# Patient Record
Sex: MALE | Race: WHITE | NOT HISPANIC OR LATINO | Employment: OTHER | URBAN - METROPOLITAN AREA
[De-identification: names, ages, dates, MRNs, and addresses within clinical notes are randomized per-mention and may not be internally consistent; named-entity substitution may affect disease eponyms.]

---

## 2017-01-19 ENCOUNTER — ANTI-COAG VISIT (OUTPATIENT)
Dept: CARDIOLOGY | Facility: CLINIC | Age: 62
End: 2017-01-19
Payer: MEDICARE

## 2017-01-19 DIAGNOSIS — I48.0 PAROXYSMAL ATRIAL FIBRILLATION: ICD-10-CM

## 2017-01-19 DIAGNOSIS — Z79.01 LONG TERM CURRENT USE OF ANTICOAGULANT THERAPY: Primary | ICD-10-CM

## 2017-01-19 LAB — INR PPP: 1.9 (ref 2–3)

## 2017-01-19 PROCEDURE — 99211 OFF/OP EST MAY X REQ PHY/QHP: CPT | Mod: 25,S$GLB,,

## 2017-01-19 PROCEDURE — 85610 PROTHROMBIN TIME: CPT | Mod: QW,S$GLB,,

## 2017-01-19 NOTE — PROGRESS NOTES
INR a tad low. Pt ate broccoli last night which was out of the normal. He denies missed doses. He denies health changes. He c/o bruising. No s/sx of bleeding. He is already due for a big dose tonight and will be having ETOH this evening. We will leave dose as is. Repeat INR next month. Pt advised to resume normal diet.

## 2017-02-14 DIAGNOSIS — I50.22 CHRONIC SYSTOLIC HEART FAILURE: ICD-10-CM

## 2017-02-15 RX ORDER — FUROSEMIDE 80 MG/1
TABLET ORAL
Qty: 30 TABLET | Refills: 5 | Status: SHIPPED | OUTPATIENT
Start: 2017-02-15 | End: 2017-08-07 | Stop reason: SDUPTHER

## 2017-02-16 ENCOUNTER — ANTI-COAG VISIT (OUTPATIENT)
Dept: CARDIOLOGY | Facility: CLINIC | Age: 62
End: 2017-02-16
Payer: MEDICARE

## 2017-02-16 DIAGNOSIS — I48.0 PAROXYSMAL ATRIAL FIBRILLATION: ICD-10-CM

## 2017-02-16 DIAGNOSIS — Z79.01 LONG TERM CURRENT USE OF ANTICOAGULANT THERAPY: Primary | ICD-10-CM

## 2017-02-16 LAB — INR PPP: 3.1 (ref 2–3)

## 2017-02-16 PROCEDURE — 85610 PROTHROMBIN TIME: CPT | Mod: QW,S$GLB,,

## 2017-02-16 PROCEDURE — 99211 OFF/OP EST MAY X REQ PHY/QHP: CPT | Mod: 25,S$GLB,,

## 2017-02-16 NOTE — PROGRESS NOTES
INR is a tad high today. Pt reports increased ETOH 2 days ago on Gonzalez's day. No other changes. Pt has minor bruising. No s/sx of bleeding. We will have him eat a dark salad or a small serving of greens to help decrease INR. Otherwise, pt will continue on maintenance dose. Repeat INR 3/9 while at [facility] for other appts.

## 2017-03-09 ENCOUNTER — CLINICAL SUPPORT (OUTPATIENT)
Dept: ELECTROPHYSIOLOGY | Facility: CLINIC | Age: 62
End: 2017-03-09
Payer: MEDICARE

## 2017-03-09 ENCOUNTER — ANTI-COAG VISIT (OUTPATIENT)
Dept: CARDIOLOGY | Facility: CLINIC | Age: 62
End: 2017-03-09
Payer: MEDICARE

## 2017-03-09 DIAGNOSIS — I42.9 CARDIOMYOPATHY, PRIMARY: ICD-10-CM

## 2017-03-09 DIAGNOSIS — Z95.810 AUTOMATIC IMPLANTABLE CARDIOVERTER-DEFIBRILLATOR IN SITU: ICD-10-CM

## 2017-03-09 DIAGNOSIS — Z79.01 LONG TERM CURRENT USE OF ANTICOAGULANT THERAPY: Primary | ICD-10-CM

## 2017-03-09 LAB — INR PPP: 3.4 (ref 2–3)

## 2017-03-09 PROCEDURE — 93284 PRGRMG EVAL IMPLANTABLE DFB: CPT | Mod: S$GLB,,, | Performed by: INTERNAL MEDICINE

## 2017-03-09 PROCEDURE — 85610 PROTHROMBIN TIME: CPT | Mod: QW,S$GLB,, | Performed by: PHARMACIST

## 2017-03-09 PROCEDURE — 99211 OFF/OP EST MAY X REQ PHY/QHP: CPT | Mod: 25,S$GLB,, | Performed by: PHARMACIST

## 2017-03-09 NOTE — PROGRESS NOTES
Patient reports no changes in diet or medications. No swelling or fluid retention. INR has been hovering high. I will lower his weekly dose of warfarin. Patient was re-educated on situations that would require placing a call to the coumadin clinic, including bleeding or unusual bruising issues, changes in health, diet or medications, upcoming procedures that require warfarin interruption, and missed coumadin dose(s). Patient expressed understanding that avoidance of consistency with these parameters could cause fluctuations in INR, leading to more frequent visits and increase risk of adverse events.

## 2017-03-30 ENCOUNTER — ANTI-COAG VISIT (OUTPATIENT)
Dept: CARDIOLOGY | Facility: CLINIC | Age: 62
End: 2017-03-30
Payer: MEDICARE

## 2017-03-30 DIAGNOSIS — Z79.01 LONG TERM CURRENT USE OF ANTICOAGULANT THERAPY: Primary | ICD-10-CM

## 2017-03-30 LAB — INR PPP: 3.4 (ref 2–3)

## 2017-03-30 PROCEDURE — 85610 PROTHROMBIN TIME: CPT | Mod: QW,S$GLB,,

## 2017-03-30 PROCEDURE — 99211 OFF/OP EST MAY X REQ PHY/QHP: CPT | Mod: 25,S$GLB,,

## 2017-03-30 NOTE — PROGRESS NOTES
INR high again. Pt reports he adjusted his dose the week of last INR then went back to previous dose. He reports he forgot to mention last visit that he was taking tylenol for a cold he had. He figured that's why he was high. He held a dose 3/16 then took 3mg 3/17-3/18. Pt advised he should not self-adjust and should have called for instructions. INR still high today. He still has a cold but is not taking anything for it. He denies changes in meds or diet. No change in ETOH. No s/sx of bleeding. We will decrease dose for now. Repeat INR in 2 weeks.

## 2017-04-13 ENCOUNTER — ANTI-COAG VISIT (OUTPATIENT)
Dept: CARDIOLOGY | Facility: CLINIC | Age: 62
End: 2017-04-13
Payer: MEDICARE

## 2017-04-13 DIAGNOSIS — Z79.01 LONG TERM CURRENT USE OF ANTICOAGULANT THERAPY: Primary | ICD-10-CM

## 2017-04-13 LAB — INR PPP: 2.3 (ref 2–3)

## 2017-04-13 PROCEDURE — 85610 PROTHROMBIN TIME: CPT | Mod: QW,S$GLB,,

## 2017-04-13 PROCEDURE — 99211 OFF/OP EST MAY X REQ PHY/QHP: CPT | Mod: 25,S$GLB,,

## 2017-04-13 NOTE — PROGRESS NOTES
Pt here for close monitoring due to recent high INR. INR good today. Pt reports urgent care visit last week due to gout attack. Pt took colchicine and hydrocodone. No other changes. No s/sx of bleeding. We will continue on new dose. Repeat INR 4/24 with other labs

## 2017-04-14 DIAGNOSIS — I50.22 CHRONIC SYSTOLIC HEART FAILURE: ICD-10-CM

## 2017-04-14 RX ORDER — VALSARTAN 160 MG/1
TABLET ORAL
Qty: 60 TABLET | Refills: 5 | Status: SHIPPED | OUTPATIENT
Start: 2017-04-14 | End: 2017-10-31 | Stop reason: SDUPTHER

## 2017-04-25 ENCOUNTER — OFFICE VISIT (OUTPATIENT)
Dept: TRANSPLANT | Facility: CLINIC | Age: 62
End: 2017-04-25
Payer: MEDICARE

## 2017-04-25 ENCOUNTER — LAB VISIT (OUTPATIENT)
Dept: LAB | Facility: HOSPITAL | Age: 62
End: 2017-04-25
Attending: INTERNAL MEDICINE
Payer: MEDICARE

## 2017-04-25 ENCOUNTER — ANTI-COAG VISIT (OUTPATIENT)
Dept: CARDIOLOGY | Facility: CLINIC | Age: 62
End: 2017-04-25

## 2017-04-25 VITALS
HEART RATE: 70 BPM | BODY MASS INDEX: 35.24 KG/M2 | DIASTOLIC BLOOD PRESSURE: 55 MMHG | HEIGHT: 73 IN | WEIGHT: 265.88 LBS | SYSTOLIC BLOOD PRESSURE: 103 MMHG

## 2017-04-25 DIAGNOSIS — Z79.01 LONG TERM CURRENT USE OF ANTICOAGULANT THERAPY: ICD-10-CM

## 2017-04-25 DIAGNOSIS — Z95.810 AUTOMATIC IMPLANTABLE CARDIOVERTER-DEFIBRILLATOR IN SITU: ICD-10-CM

## 2017-04-25 DIAGNOSIS — Z95.0 BIVENTRICULAR CARDIAC PACEMAKER IN SITU: ICD-10-CM

## 2017-04-25 DIAGNOSIS — I48.21 PERMANENT ATRIAL FIBRILLATION: ICD-10-CM

## 2017-04-25 DIAGNOSIS — I42.0 DILATED CARDIOMYOPATHY: Chronic | ICD-10-CM

## 2017-04-25 DIAGNOSIS — I44.7 LBBB (LEFT BUNDLE BRANCH BLOCK): ICD-10-CM

## 2017-04-25 DIAGNOSIS — I50.22 CHRONIC SYSTOLIC HEART FAILURE: Primary | ICD-10-CM

## 2017-04-25 LAB
ALBUMIN SERPL BCP-MCNC: 3.9 G/DL
ALP SERPL-CCNC: 64 U/L
ALT SERPL W/O P-5'-P-CCNC: 16 U/L
ANION GAP SERPL CALC-SCNC: 6 MMOL/L
AST SERPL-CCNC: 15 U/L
BILIRUB SERPL-MCNC: 0.7 MG/DL
BUN SERPL-MCNC: 23 MG/DL
CALCIUM SERPL-MCNC: 9.4 MG/DL
CHLORIDE SERPL-SCNC: 102 MMOL/L
CO2 SERPL-SCNC: 31 MMOL/L
CREAT SERPL-MCNC: 1.8 MG/DL
EST. GFR  (AFRICAN AMERICAN): 46.6 ML/MIN/1.73 M^2
EST. GFR  (NON AFRICAN AMERICAN): 40.3 ML/MIN/1.73 M^2
GLUCOSE SERPL-MCNC: 157 MG/DL
INR PPP: 2.8
POTASSIUM SERPL-SCNC: 5.2 MMOL/L
PROT SERPL-MCNC: 7.6 G/DL
PROTHROMBIN TIME: 28.4 SEC
SODIUM SERPL-SCNC: 139 MMOL/L

## 2017-04-25 PROCEDURE — 99999 PR PBB SHADOW E&M-EST. PATIENT-LVL III: CPT | Mod: PBBFAC,,, | Performed by: INTERNAL MEDICINE

## 2017-04-25 PROCEDURE — 3078F DIAST BP <80 MM HG: CPT | Mod: S$GLB,,, | Performed by: INTERNAL MEDICINE

## 2017-04-25 PROCEDURE — 3074F SYST BP LT 130 MM HG: CPT | Mod: S$GLB,,, | Performed by: INTERNAL MEDICINE

## 2017-04-25 PROCEDURE — 1160F RVW MEDS BY RX/DR IN RCRD: CPT | Mod: S$GLB,,, | Performed by: INTERNAL MEDICINE

## 2017-04-25 PROCEDURE — 99215 OFFICE O/P EST HI 40 MIN: CPT | Mod: S$GLB,,, | Performed by: INTERNAL MEDICINE

## 2017-04-25 RX ORDER — HYDROCODONE BITARTRATE AND ACETAMINOPHEN 5; 325 MG/1; MG/1
TABLET ORAL
Refills: 0 | COMMUNITY
Start: 2017-04-07 | End: 2017-06-19

## 2017-04-25 RX ORDER — COLCHICINE 0.6 MG/1
TABLET ORAL
Refills: 0 | COMMUNITY
Start: 2017-04-19

## 2017-04-25 RX ORDER — ALPRAZOLAM 0.5 MG/1
TABLET ORAL
Refills: 0 | COMMUNITY
Start: 2017-04-22 | End: 2017-04-25 | Stop reason: SDUPTHER

## 2017-04-25 NOTE — PROGRESS NOTES
Subjective:    Patient ID:  Shae Ramesh III is a 61 y.o. male who presents for follow-up of No chief complaint on file.      Congestive Heart Failure   Pertinent negatives include no nausea or vomiting.       Mr. 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 Doing well no major symptoms.     Review of Systems   Constitution: Negative for decreased appetite, malaise/fatigue, night sweats, weight gain and weight loss.   Cardiovascular: Negative for claudication, cyanosis, dyspnea on exertion, irregular heartbeat, leg swelling, near-syncope, orthopnea and palpitations.   Respiratory: Negative for hemoptysis, shortness of breath, sleep disturbances due to breathing, snoring, sputum production and wheezing.    Skin: Positive for color change.   Gastrointestinal: Negative for diarrhea, nausea and vomiting.            Physical Exam   Constitutional: He is oriented to person, place, and time. He appears well-developed and well-nourished.   HENT:   Head: Normocephalic and atraumatic.   Eyes: Conjunctivae and EOM are normal. Pupils are equal, round, and reactive to light.   Neck: Normal range of motion. Neck supple. No JVD present.   Cardiovascular: Normal rate and regular rhythm.  Exam reveals no gallop and no friction rub.    Murmur heard.   Holosystolic murmur is present with a grade of 2/6   Pulmonary/Chest: Breath sounds normal. No respiratory distress. He has no wheezes. He has no rales. He exhibits no tenderness.   Abdominal: Soft. Bowel sounds are normal. He exhibits no distension and no mass. There is no hepatosplenomegaly, splenomegaly or hepatomegaly. There is no tenderness. There is no rebound, no guarding and no CVA tenderness.   Musculoskeletal: Normal range of motion. He exhibits no tenderness.    Neurological: He is alert and oriented to person, place, and time. He has normal reflexes. No cranial nerve deficit. He exhibits normal muscle tone. Coordination normal.   Skin: Skin is warm and dry.   Bilateral lower extremity erythema to the anterior shin with prominent varicose veins.   Psychiatric: He has a normal mood and affect.     Assessment:       1. Chronic systolic heart failure    2. Permanent atrial fibrillation    3. LBBB (left bundle branch block)    4. Long term current use of anticoagulant therapy    5. Automatic implantable cardioverter-defibrillator in situ    6. Biventricular cardiac pacemaker in situ         Plan:     NO need for mitral clip. He is asymptomatic        RTC 6 months with 2 D echo

## 2017-04-25 NOTE — MR AVS SNAPSHOT
[facility]  [address]  [facility] [address]  Phone: [phone]                  Shae Ramesh III   2017 1:00 PM   Office Visit    Description:  Male : 1955   Provider:  James Vieyra MD   Department:  [facility]           Reason for Visit     Congestive Heart Failure           Diagnoses this Visit        Comments    Chronic systolic heart failure    -  Primary     Permanent atrial fibrillation         LBBB (left bundle branch block)         Long term current use of anticoagulant therapy         Automatic implantable cardioverter-defibrillator in situ         Biventricular cardiac pacemaker in situ                To Do List           Future Appointments        Provider Department Dept Phone    2017 12:30 PM EKG, APPT [facility] - [phone]    2017 1:00 PM PACEMAKER, ICD [facility] - Arrhythmia [phone]    2017 1:30 PM Myrna Davison, FNP [facility] - Arrhythmia [phone]      Goals (5 Years of Data)     None      Follow-Up and Disposition     Return in about 6 months (around 10/25/2017).    Follow-up and Disposition History      [facility]     [facility] Care Line -  Assistance  Unless otherwise directed by your provider, please contact [facility], our nurse care line that is available for  assistance.     Registered nurses in the [facility] provide: appointment scheduling, clinical advisement, health education, and other advisory services.  Call: [phone] (toll free)               Medications           Message regarding Medications     Verify the changes and/or additions to your medication regime listed below are the same as discussed with your clinician today.  If any of these changes or additions are incorrect, please notify your healthcare provider.        STOP taking these medications     alprazolam (XANAX) 0.5 MG tablet take 1 tablet by mouth if needed for anxiety          "  Verify that the below list of medications is an accurate representation of the medications you are currently taking.  If none reported, the list may be blank. If incorrect, please contact your healthcare provider. Carry this list with you in case of emergency.           Current Medications     alprazolam (XANAX XR) 0.5 MG 24 hr tablet Take 0.5 mg by mouth as needed.     amiodarone (PACERONE) 200 MG Tab take 1 tablet by mouth twice a day for 1 MONTH then 1 tablet by mouth once daily    carvedilol (COREG) 25 MG tablet Take 0.5 tablets (12.5 mg total) by mouth 2 (two) times daily.    colchicine 0.6 mg tablet take 1 tablet by mouth twice a day if needed for gout pain    furosemide (LASIX) 80 MG tablet take 1 tablet by mouth once daily    hydrocodone-acetaminophen 5-325mg (NORCO) 5-325 mg per tablet TAKE 1 TABLET BY MOUTH EVERY 6 HOURS (NO DRIVING WHILE ON THIS MEDICINE)    spironolactone (ALDACTONE) 25 MG tablet Take 1 tablet (25 mg total) by mouth once daily.    valsartan (DIOVAN) 160 MG tablet take 1 tablet by mouth twice a day for blood pressure    warfarin (COUMADIN) 3 MG tablet take 1 tablet by mouth once daily OR AS DIRECTED BY COUMADIN CLINIC    warfarin (COUMADIN) 5 MG tablet Take 1 tablet (5mg) by mouth every day except 8mg on Thursday, Or as directed by Coumadin Clinic  Patient has 3mg tabs           Clinical Reference Information           Your Vitals Were     BP Pulse Height Weight BMI    103/55 70 6' 1" (1.854 m) 120.6 kg (265 lb 14 oz) 35.08 kg/m2      Blood Pressure          Most Recent Value    BP  (!)  103/55      Allergies as of 4/25/2017     Crestor [Rosuvastatin]    Lisinopril      Immunizations Administered on Date of Encounter - 4/25/2017     None      Orders Placed During Today's Visit     Future Labs/Procedures Expected by Expires    2D echo with color flow doppler  10/25/2017 4/25/2018      Maintenance Dialysis History     Patient has no recorded history of maintenance dialysis.      [facility] " Sign-Up     Activating your [facility] account is as easy as 1-2-3!     1) Visit [email or web address], select Sign Up Now, enter this activation code and your date of birth, then select Next.  Activation code not generated  Current Patient Portal Status: Account disabled      2) Create a username and password to use when you visit [facility] in the future and select a security question in case you lose your password and select Next.    3) Enter your e-mail address and click Sign Up!    Additional Information  If you have questions, please e-mail [email or web address] or call [phone] to talk to our [facility] staff. Remember, [facility] is NOT to be used for urgent needs. For medical emergencies, dial 911.         Language Assistance Services     ATTENTION: Language assistance services are available, free of charge. Please call [phone].      ATENCIÓN: Si habla español, tiene a short disposición servicios gratuitos de asistencia lingüística. Llame al [phone].     CHÚ Ý: N?u b?n nói Ti?ng Vi?t, có các d?ch v? h? tr? ngôn ng? mi?n phí dành cho b?n. G?i s? [phone].         [facility] complies with applicable Federal civil rights laws and does not discriminate on the basis of race, color, national origin, age, disability, or sex.

## 2017-05-12 DIAGNOSIS — Z79.01 LONG TERM CURRENT USE OF ANTICOAGULANT THERAPY: ICD-10-CM

## 2017-05-12 DIAGNOSIS — Z79.01 LONG-TERM (CURRENT) USE OF ANTICOAGULANTS: ICD-10-CM

## 2017-05-12 DIAGNOSIS — I48.0 PAROXYSMAL ATRIAL FIBRILLATION: ICD-10-CM

## 2017-05-13 RX ORDER — WARFARIN SODIUM 5 MG/1
TABLET ORAL
Qty: 30 TABLET | Refills: 5 | Status: SHIPPED | OUTPATIENT
Start: 2017-05-13 | End: 2018-05-04 | Stop reason: SDUPTHER

## 2017-05-24 ENCOUNTER — ANTI-COAG VISIT (OUTPATIENT)
Dept: CARDIOLOGY | Facility: CLINIC | Age: 62
End: 2017-05-24
Payer: MEDICARE

## 2017-05-24 DIAGNOSIS — Z79.01 LONG TERM CURRENT USE OF ANTICOAGULANT THERAPY: Primary | ICD-10-CM

## 2017-05-24 DIAGNOSIS — I48.21 PERMANENT ATRIAL FIBRILLATION: ICD-10-CM

## 2017-05-24 LAB — INR PPP: 3.6 (ref 2–3)

## 2017-05-24 PROCEDURE — 99211 OFF/OP EST MAY X REQ PHY/QHP: CPT | Mod: 25,S$GLB,,

## 2017-05-24 PROCEDURE — 85610 PROTHROMBIN TIME: CPT | Mod: QW,S$GLB,,

## 2017-05-24 NOTE — PROGRESS NOTES
INR is high. Patient reports a neck injury. He has been in severe pain. He is undergoing work up with orthopedist. He has been taking pain medications that have acetaminophen in them about 3 times per day. He has not been sleeping well. No other changes. No s/sx of bleeding. We will hold a dose today then resume maintenance dose. Repeat INR in 2 weeks

## 2017-06-06 ENCOUNTER — TELEPHONE (OUTPATIENT)
Dept: ELECTROPHYSIOLOGY | Facility: CLINIC | Age: 62
End: 2017-06-06

## 2017-06-06 DIAGNOSIS — I42.9 CARDIOMYOPATHY, UNSPECIFIED TYPE: Primary | ICD-10-CM

## 2017-06-08 ENCOUNTER — ANTI-COAG VISIT (OUTPATIENT)
Dept: CARDIOLOGY | Facility: CLINIC | Age: 62
End: 2017-06-08
Payer: MEDICARE

## 2017-06-08 DIAGNOSIS — Z79.01 LONG TERM CURRENT USE OF ANTICOAGULANT THERAPY: Primary | ICD-10-CM

## 2017-06-08 DIAGNOSIS — I48.21 PERMANENT ATRIAL FIBRILLATION: ICD-10-CM

## 2017-06-08 LAB — INR PPP: 3 (ref 2–3)

## 2017-06-08 PROCEDURE — 99211 OFF/OP EST MAY X REQ PHY/QHP: CPT | Mod: 25,S$GLB,,

## 2017-06-08 PROCEDURE — 85610 PROTHROMBIN TIME: CPT | Mod: QW,S$GLB,,

## 2017-06-19 ENCOUNTER — OFFICE VISIT (OUTPATIENT)
Dept: ELECTROPHYSIOLOGY | Facility: CLINIC | Age: 62
End: 2017-06-19
Payer: MEDICARE

## 2017-06-19 ENCOUNTER — CLINICAL SUPPORT (OUTPATIENT)
Dept: ELECTROPHYSIOLOGY | Facility: CLINIC | Age: 62
End: 2017-06-19
Payer: MEDICARE

## 2017-06-19 ENCOUNTER — HOSPITAL ENCOUNTER (OUTPATIENT)
Dept: CARDIOLOGY | Facility: CLINIC | Age: 62
Discharge: HOME | End: 2017-06-19
Payer: MEDICARE

## 2017-06-19 VITALS
SYSTOLIC BLOOD PRESSURE: 112 MMHG | HEIGHT: 73 IN | HEART RATE: 66 BPM | BODY MASS INDEX: 33.54 KG/M2 | DIASTOLIC BLOOD PRESSURE: 80 MMHG | WEIGHT: 253.06 LBS

## 2017-06-19 DIAGNOSIS — I10 ESSENTIAL HYPERTENSION: Chronic | ICD-10-CM

## 2017-06-19 DIAGNOSIS — Z79.899 ON AMIODARONE THERAPY: ICD-10-CM

## 2017-06-19 DIAGNOSIS — I34.0 SEVERE MITRAL REGURGITATION: ICD-10-CM

## 2017-06-19 DIAGNOSIS — I42.9 CARDIOMYOPATHY, UNSPECIFIED TYPE: ICD-10-CM

## 2017-06-19 DIAGNOSIS — I44.7 LBBB (LEFT BUNDLE BRANCH BLOCK): ICD-10-CM

## 2017-06-19 DIAGNOSIS — I50.22 CHF (CONGESTIVE HEART FAILURE), NYHA CLASS III, CHRONIC, SYSTOLIC: ICD-10-CM

## 2017-06-19 DIAGNOSIS — E66.9 OBESITY (BMI 30.0-34.9): ICD-10-CM

## 2017-06-19 DIAGNOSIS — I42.0 DILATED CARDIOMYOPATHY: Primary | Chronic | ICD-10-CM

## 2017-06-19 DIAGNOSIS — Z79.01 CURRENT USE OF LONG TERM ANTICOAGULATION: ICD-10-CM

## 2017-06-19 DIAGNOSIS — E78.5 DYSLIPIDEMIA: ICD-10-CM

## 2017-06-19 DIAGNOSIS — Z95.810 BIVENTRICULAR IMPLANTABLE CARDIOVERTER-DEFIBRILLATOR (ICD) IN SITU: ICD-10-CM

## 2017-06-19 DIAGNOSIS — I48.21 PERMANENT ATRIAL FIBRILLATION: ICD-10-CM

## 2017-06-19 DIAGNOSIS — Z95.810 AUTOMATIC IMPLANTABLE CARDIOVERTER-DEFIBRILLATOR IN SITU: ICD-10-CM

## 2017-06-19 DIAGNOSIS — I42.9 CARDIOMYOPATHY, PRIMARY: ICD-10-CM

## 2017-06-19 PROCEDURE — 99999 PR PBB SHADOW E&M-EST. PATIENT-LVL III: CPT | Mod: PBBFAC,,, | Performed by: NURSE PRACTITIONER

## 2017-06-19 PROCEDURE — 93284 PRGRMG EVAL IMPLANTABLE DFB: CPT | Mod: S$GLB,,, | Performed by: INTERNAL MEDICINE

## 2017-06-19 PROCEDURE — 93000 ELECTROCARDIOGRAM COMPLETE: CPT | Mod: S$GLB,,, | Performed by: INTERNAL MEDICINE

## 2017-06-19 PROCEDURE — 99214 OFFICE O/P EST MOD 30 MIN: CPT | Mod: S$GLB,,, | Performed by: NURSE PRACTITIONER

## 2017-06-19 RX ORDER — OXYCODONE AND ACETAMINOPHEN 10; 325 MG/1; MG/1
1 TABLET ORAL
Refills: 0 | COMMUNITY
Start: 2017-06-01 | End: 2017-06-19

## 2017-06-19 NOTE — PROGRESS NOTES
Subjective:    Patient ID:  Shae Ramesh III is a 61 y.o. male who presents for follow-up of BiV ICD Check.     Shae Ramesh III is a patient of Dr. Niesha Rodriguez.    HPI     Mr. Ramesh is a 61 y.o. male with DCM, HFrEF (EF 30-35%), LBBB s/p BiV ICD, AF, severe MR, HTN, dyslipidemia, and obesity. Initially following CRT-D placement, Mr. Ramesh experienced improvement in his EF, but ultimately, it worsened and he developed some CHF sx. At his [facility] EP office visit in June of 2015. Mr. Ramesh was started on amiodarone and aldactone due to AF and apparent R-sided overload. While on 400 mg of amiodaroine daily, his legs became swollen and discolored w/associated SOB. A BNP at the time (07/2016) was 792 (usually 100). Aldactone was increased to 25 bid on in July of 2016, after which he noted symptomatic improvement.   Mr. Ramesh was seen by Dr. Castle, re: mitraclradha and was initially deemed a good candidate but Dr. Vieyra was max'ing his med Rx, and at the time, Mr. Ramesh had been doing a lot better , and at the time, his MR appeared a bit less by Echo. This was deferred.    Since his last office visit (05/20/16), Mr. Ramesh reports feeling well; he denies chest pain, SOB/SLOAN, dizziness, palpitations, or syncope. He reports that he regularly works out at the gym without difficulty.     Recent cardiac studies:  Echo (08/26/16) revealed an EF of 30-35%, biatrial enlargement (moderate LAE; NILTON measuring 46.46 cc/m2), LVDD, severe LVE, eccentric hypertrophy, moderate-to-severe MR, RVE w/normal systolic function, and a PASP of 30 mmHg.   Device Interrogation (06/19/17) reveals an intrinsic SR with stable lead and device function. No arrhythmias or treated episodes noted. He RA paces 26% and BiV paces 95% of the time. Battery voltage 2.900 V (DAVID 2.66 V).     I reviewed today's ECG which demonstrated a BiV-paced rhythm at 66 bpm; , , and QTc 492.    Review of Systems   Constitution: Negative for diaphoresis  and malaise/fatigue.   HENT: Negative for headaches and nosebleeds.    Eyes: Negative for double vision.   Cardiovascular: Negative for chest pain, dyspnea on exertion, irregular heartbeat, near-syncope, palpitations and syncope.   Respiratory: Negative for shortness of breath.    Skin: Negative.    Musculoskeletal: Positive for stiffness.   Gastrointestinal: Negative for abdominal pain, hematemesis and hematochezia.   Genitourinary: Negative for hematuria.   Neurological: Negative for dizziness and light-headedness.   Psychiatric/Behavioral: Negative for altered mental status.        Objective:    Physical Exam   Constitutional: He is oriented to person, place, and time. He appears well-developed and well-nourished.   HENT:   Head: Normocephalic and atraumatic.   Eyes: Pupils are equal, round, and reactive to light.   Cardiovascular: Normal rate, regular rhythm, normal heart sounds and intact distal pulses.    Pulmonary/Chest: Effort normal and breath sounds normal.   Musculoskeletal: Normal range of motion.   Neurological: He is alert and oriented to person, place, and time.   Vitals reviewed.        Assessment:       1. Dilated cardiomyopathy    2. CHF (congestive heart failure), NYHA class III, chronic, systolic    3. LBBB (left bundle branch block)    4. Biventricular implantable cardioverter-defibrillator (ICD) in situ    5. Permanent atrial fibrillation    6. On amiodarone therapy    7. Current use of long term anticoagulation    8. Severe mitral regurgitation    9. Essential hypertension    10. Dyslipidemia    11. Obesity (BMI 30.0-34.9)         Plan:       In summary, Mr. Ramesh is a 61 y.o. male with DCM, HFrEF (EF 30-35%), LBBB s/p BiV ICD, AF, severe MR, HTN, dyslipidemia, and obesity. Mr. Ramesh is doing well from a rhythm perspective with stable lead and device function, 95% BiV pacing, and no arrhythmia noted.    TSH and PFTs now, given long-term amiodarone use; recent LFTs WNL.   Continue current  medication regimen and device settings.   Follow up in device clinic as scheduled.   Follow up in EP clinic in 1 year, sooner as needed.     Myrna Davison, [place], APRN, FNP-C        (A copy of today's note was sent to Dr. Niesha Rodriguez.)

## 2017-06-20 ENCOUNTER — TELEPHONE (OUTPATIENT)
Dept: CARDIOLOGY | Facility: CLINIC | Age: 62
End: 2017-06-20

## 2017-06-20 NOTE — TELEPHONE ENCOUNTER
----- Message from DARÍO Shaffer sent at 6/19/2017  4:29 PM CDT -----  Please let Mr. Ramesh know that his Thyroid level is normal.     Thanks,   CM

## 2017-06-24 RX ORDER — WARFARIN 3 MG/1
TABLET ORAL
Qty: 30 TABLET | Refills: 5 | Status: SHIPPED | OUTPATIENT
Start: 2017-06-24 | End: 2019-02-07 | Stop reason: SDUPTHER

## 2017-07-02 RX ORDER — CARVEDILOL 25 MG/1
TABLET ORAL
Qty: 60 TABLET | Refills: 6 | Status: SHIPPED | OUTPATIENT
Start: 2017-07-02 | End: 2017-09-29 | Stop reason: SDUPTHER

## 2017-07-06 ENCOUNTER — ANTI-COAG VISIT (OUTPATIENT)
Dept: CARDIOLOGY | Facility: CLINIC | Age: 62
End: 2017-07-06
Payer: MEDICARE

## 2017-07-06 DIAGNOSIS — Z79.01 CURRENT USE OF LONG TERM ANTICOAGULATION: Primary | ICD-10-CM

## 2017-07-06 DIAGNOSIS — I48.21 PERMANENT ATRIAL FIBRILLATION: ICD-10-CM

## 2017-07-06 LAB — INR PPP: 3.2 (ref 2–3)

## 2017-07-06 PROCEDURE — 99211 OFF/OP EST MAY X REQ PHY/QHP: CPT | Mod: 25,S$GLB,,

## 2017-07-06 PROCEDURE — 85610 PROTHROMBIN TIME: CPT | Mod: QW,S$GLB,,

## 2017-07-06 NOTE — PROGRESS NOTES
INR trending high. Patient reports a few beers 7/4 but not really out of the ordinary. He is watching his diet and trying to lose weight. He has a few bruises. No signs or symptoms of bleeding. We will decrease dose for now. Repeat INR in 2 weeks.

## 2017-07-20 ENCOUNTER — ANTI-COAG VISIT (OUTPATIENT)
Dept: CARDIOLOGY | Facility: CLINIC | Age: 62
End: 2017-07-20
Payer: MEDICARE

## 2017-07-20 DIAGNOSIS — I48.21 PERMANENT ATRIAL FIBRILLATION: ICD-10-CM

## 2017-07-20 DIAGNOSIS — Z79.01 CURRENT USE OF LONG TERM ANTICOAGULATION: Primary | ICD-10-CM

## 2017-07-20 LAB
CTP QC/QA: YES
INR PPP: 2.2 (ref 2–3)

## 2017-07-20 PROCEDURE — 99211 OFF/OP EST MAY X REQ PHY/QHP: CPT | Mod: 25,S$GLB,,

## 2017-07-20 PROCEDURE — 85610 PROTHROMBIN TIME: CPT | Mod: QW,S$GLB,,

## 2017-07-20 NOTE — PROGRESS NOTES
Patient here for close follow up of recent high INRs and new dose. INR is good today. Patient denies changes. We will continue on current dose. Repeat INR in 2 weeks.

## 2017-08-03 ENCOUNTER — ANTI-COAG VISIT (OUTPATIENT)
Dept: CARDIOLOGY | Facility: CLINIC | Age: 62
End: 2017-08-03
Payer: MEDICARE

## 2017-08-03 DIAGNOSIS — I48.21 PERMANENT ATRIAL FIBRILLATION: ICD-10-CM

## 2017-08-03 DIAGNOSIS — Z79.01 CURRENT USE OF LONG TERM ANTICOAGULATION: Primary | ICD-10-CM

## 2017-08-03 LAB — INR PPP: 2 (ref 2–3)

## 2017-08-03 PROCEDURE — 99211 OFF/OP EST MAY X REQ PHY/QHP: CPT | Mod: 25,S$GLB,,

## 2017-08-03 PROCEDURE — 85610 PROTHROMBIN TIME: CPT | Mod: QW,S$GLB,,

## 2017-08-03 NOTE — PROGRESS NOTES
INR is good. Patient reports fluid in elbow. He reports its not painful. He has seen his PCP. He reports he took colchicine for 3 days. He also had an injection in the office. He does not recall what medication was in the injection. No other changes. No signs or symptoms of bleeding. INR ok. Dose was lowered recently and so we have been watching INR closely. Its on the low end today. We will keep dose as is but will continue close monitoring. Repeat INR in 2 weeks.

## 2017-08-06 RX ORDER — AMIODARONE HYDROCHLORIDE 200 MG/1
TABLET ORAL
Qty: 60 TABLET | Refills: 4 | Status: SHIPPED | OUTPATIENT
Start: 2017-08-06 | End: 2018-10-04

## 2017-08-07 DIAGNOSIS — I50.22 CHRONIC SYSTOLIC HEART FAILURE: ICD-10-CM

## 2017-08-07 RX ORDER — FUROSEMIDE 80 MG/1
TABLET ORAL
Qty: 30 TABLET | Refills: 5 | Status: SHIPPED | OUTPATIENT
Start: 2017-08-07 | End: 2018-01-27 | Stop reason: SDUPTHER

## 2017-08-17 ENCOUNTER — ANTI-COAG VISIT (OUTPATIENT)
Dept: CARDIOLOGY | Facility: CLINIC | Age: 62
End: 2017-08-17

## 2017-08-17 ENCOUNTER — LAB VISIT (OUTPATIENT)
Dept: LAB | Facility: HOSPITAL | Age: 62
End: 2017-08-17
Payer: MEDICARE

## 2017-08-17 DIAGNOSIS — Z79.01 LONG TERM CURRENT USE OF ANTICOAGULANT THERAPY: ICD-10-CM

## 2017-08-17 DIAGNOSIS — Z79.01 CURRENT USE OF LONG TERM ANTICOAGULATION: ICD-10-CM

## 2017-08-17 LAB
INR PPP: 1.8
PROTHROMBIN TIME: 17.9 SEC

## 2017-08-17 PROCEDURE — 36415 COLL VENOUS BLD VENIPUNCTURE: CPT | Mod: PO

## 2017-08-17 PROCEDURE — 85610 PROTHROMBIN TIME: CPT

## 2017-08-31 ENCOUNTER — ANTI-COAG VISIT (OUTPATIENT)
Dept: CARDIOLOGY | Facility: CLINIC | Age: 62
End: 2017-08-31
Payer: MEDICARE

## 2017-08-31 DIAGNOSIS — Z79.01 CURRENT USE OF LONG TERM ANTICOAGULATION: ICD-10-CM

## 2017-08-31 LAB — INR PPP: 2.2 (ref 2–3)

## 2017-08-31 PROCEDURE — 99211 OFF/OP EST MAY X REQ PHY/QHP: CPT | Mod: 25,S$GLB,,

## 2017-08-31 PROCEDURE — 85610 PROTHROMBIN TIME: CPT | Mod: QW,S$GLB,,

## 2017-08-31 NOTE — PROGRESS NOTES
Patient here for close monitoring due to recent low INR and dose change. Patient reports increased uric acid level so started allopurinol. He also reports increase in S.Cr and is now having kidney work up. No other changes. Patient has bruising but nothing significant. No signs or symptoms of bleeding. We will continue on current dose and repeat INR in 3 weeks.

## 2017-09-21 ENCOUNTER — LAB VISIT (OUTPATIENT)
Dept: LAB | Facility: HOSPITAL | Age: 62
End: 2017-09-21
Attending: INTERNAL MEDICINE
Payer: MEDICARE

## 2017-09-21 ENCOUNTER — ANTI-COAG VISIT (OUTPATIENT)
Dept: CARDIOLOGY | Facility: CLINIC | Age: 62
End: 2017-09-21

## 2017-09-21 DIAGNOSIS — Z79.01 LONG TERM CURRENT USE OF ANTICOAGULANT THERAPY: ICD-10-CM

## 2017-09-21 DIAGNOSIS — Z79.01 CURRENT USE OF LONG TERM ANTICOAGULATION: ICD-10-CM

## 2017-09-21 LAB
INR PPP: 1.7
PROTHROMBIN TIME: 16.9 SEC

## 2017-09-21 PROCEDURE — 85610 PROTHROMBIN TIME: CPT

## 2017-09-21 PROCEDURE — 36415 COLL VENOUS BLD VENIPUNCTURE: CPT | Mod: PO

## 2017-09-22 DIAGNOSIS — I50.22 CHRONIC SYSTOLIC HEART FAILURE: Primary | ICD-10-CM

## 2017-09-27 DIAGNOSIS — I42.0 DILATED CARDIOMYOPATHY: ICD-10-CM

## 2017-09-27 DIAGNOSIS — Z95.810 ICD (IMPLANTABLE CARDIOVERTER-DEFIBRILLATOR) IN PLACE: Primary | ICD-10-CM

## 2017-09-29 ENCOUNTER — HOSPITAL ENCOUNTER (OUTPATIENT)
Dept: CARDIOLOGY | Facility: CLINIC | Age: 62
Discharge: HOME | End: 2017-09-29
Payer: MEDICARE

## 2017-09-29 ENCOUNTER — OFFICE VISIT (OUTPATIENT)
Dept: TRANSPLANT | Facility: CLINIC | Age: 62
End: 2017-09-29
Payer: MEDICARE

## 2017-09-29 ENCOUNTER — CLINICAL SUPPORT (OUTPATIENT)
Dept: ELECTROPHYSIOLOGY | Facility: CLINIC | Age: 62
End: 2017-09-29
Payer: MEDICARE

## 2017-09-29 VITALS
HEIGHT: 73 IN | DIASTOLIC BLOOD PRESSURE: 58 MMHG | SYSTOLIC BLOOD PRESSURE: 109 MMHG | WEIGHT: 254 LBS | BODY MASS INDEX: 33.66 KG/M2 | HEART RATE: 82 BPM

## 2017-09-29 DIAGNOSIS — I50.22 CHRONIC SYSTOLIC HEART FAILURE: Primary | ICD-10-CM

## 2017-09-29 DIAGNOSIS — E66.9 OBESITY (BMI 30.0-34.9): ICD-10-CM

## 2017-09-29 DIAGNOSIS — I10 ESSENTIAL HYPERTENSION: Chronic | ICD-10-CM

## 2017-09-29 DIAGNOSIS — I42.0 DILATED CARDIOMYOPATHY: ICD-10-CM

## 2017-09-29 DIAGNOSIS — Z95.810 ICD (IMPLANTABLE CARDIOVERTER-DEFIBRILLATOR) IN PLACE: ICD-10-CM

## 2017-09-29 DIAGNOSIS — Z79.899 ENCOUNTER FOR LONG-TERM (CURRENT) USE OF OTHER MEDICATIONS: ICD-10-CM

## 2017-09-29 DIAGNOSIS — I50.22 CHRONIC SYSTOLIC HEART FAILURE: ICD-10-CM

## 2017-09-29 DIAGNOSIS — Z95.810 BIVENTRICULAR IMPLANTABLE CARDIOVERTER-DEFIBRILLATOR (ICD) IN SITU: ICD-10-CM

## 2017-09-29 DIAGNOSIS — N17.9 ACUTE KIDNEY INJURY: ICD-10-CM

## 2017-09-29 DIAGNOSIS — I48.20 CHRONIC ATRIAL FIBRILLATION: ICD-10-CM

## 2017-09-29 DIAGNOSIS — E87.6 HYPOKALEMIA: ICD-10-CM

## 2017-09-29 LAB
ESTIMATED PA SYSTOLIC PRESSURE: 29.83
MITRAL VALVE MOBILITY: ABNORMAL
MITRAL VALVE REGURGITATION: ABNORMAL
RETIRED EF AND QEF - SEE NOTES: 25 (ref 55–65)
TRICUSPID VALVE REGURGITATION: ABNORMAL

## 2017-09-29 PROCEDURE — 93306 TTE W/DOPPLER COMPLETE: CPT | Mod: S$GLB,,, | Performed by: INTERNAL MEDICINE

## 2017-09-29 PROCEDURE — 3074F SYST BP LT 130 MM HG: CPT | Mod: S$GLB,,, | Performed by: INTERNAL MEDICINE

## 2017-09-29 PROCEDURE — 99999 PR PBB SHADOW E&M-EST. PATIENT-LVL III: CPT | Mod: PBBFAC,,, | Performed by: INTERNAL MEDICINE

## 2017-09-29 PROCEDURE — 93284 PRGRMG EVAL IMPLANTABLE DFB: CPT | Mod: S$GLB,,, | Performed by: INTERNAL MEDICINE

## 2017-09-29 PROCEDURE — 99214 OFFICE O/P EST MOD 30 MIN: CPT | Mod: S$GLB,,, | Performed by: INTERNAL MEDICINE

## 2017-09-29 PROCEDURE — 3078F DIAST BP <80 MM HG: CPT | Mod: S$GLB,,, | Performed by: INTERNAL MEDICINE

## 2017-09-29 PROCEDURE — 3008F BODY MASS INDEX DOCD: CPT | Mod: S$GLB,,, | Performed by: INTERNAL MEDICINE

## 2017-09-29 RX ORDER — ALLOPURINOL 100 MG/1
100 TABLET ORAL DAILY
Refills: 0 | COMMUNITY
Start: 2017-09-06

## 2017-09-29 RX ORDER — CARVEDILOL 12.5 MG/1
12.5 TABLET ORAL 2 TIMES DAILY
Qty: 60 TABLET | Refills: 6 | Status: SHIPPED | OUTPATIENT
Start: 2017-09-29 | End: 2018-04-29 | Stop reason: SDUPTHER

## 2017-09-29 RX ORDER — CARVEDILOL 12.5 MG/1
12.5 TABLET ORAL 2 TIMES DAILY
COMMUNITY
End: 2017-09-29 | Stop reason: SDUPTHER

## 2017-09-29 NOTE — LETTER
September 29, 2017        Gabriel Howard  [address]  [address]  [place] [address]  Phone: [phone]  Fax: [phone]     Ming Pate  [address] [place] [address] [address]  [place] [address]  Phone: [phone]  Fax: [phone]             [facility]  [address]  [facility] [address]  Phone: [phone]   Patient: Shae Ramesh III   MR Number: [id number]   YOB: 1955   Date of Visit: 9/29/2017       Dear Dr. Ming Pate, Gabriel Howard    Thank you for referring Shae Ramesh to me for evaluation. Attached you will find relevant portions of my assessment and plan of care.    If you have questions, please do not hesitate to call me. I look forward to following Shae Ramesh along with you.    Sincerely,    James Vieyra MD    Enclosure    If you would like to receive this communication electronically, please contact [email or web address] or [phone] to request [employer] Link access.    [employer] Link is a tool which provides read-only access to select patient information with whom you have a relationship. Its easy to use and provides real time access to review your patients record including encounter summaries, notes, results, and demographic information.    If you feel you have received this communication in error or would no longer like to receive these types of communications, please e-mail [email or web address]

## 2017-09-29 NOTE — PROGRESS NOTES
Subjective:    Patient ID:  Shae Ramesh III is a 61 y.o. male who presents for follow-up of Congestive Heart Failure      Congestive Heart Failure   Pertinent negatives include no nausea or vomiting.       Mr. 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 Doing well no major symptoms. Creatinine 1.8 mg/dl    Review of Systems   Constitution: Negative for decreased appetite, malaise/fatigue, night sweats, weight gain and weight loss.   Cardiovascular: Negative for claudication, cyanosis, dyspnea on exertion, irregular heartbeat, leg swelling, near-syncope, orthopnea and palpitations.   Respiratory: Negative for hemoptysis, shortness of breath, sleep disturbances due to breathing, snoring, sputum production and wheezing.    Skin: Positive for color change.   Gastrointestinal: Negative for diarrhea, nausea and vomiting.            Physical Exam   Constitutional: He is oriented to person, place, and time. He appears well-developed and well-nourished.   HENT:   Head: Normocephalic and atraumatic.   Eyes: Conjunctivae and EOM are normal. Pupils are equal, round, and reactive to light.   Neck: Normal range of motion. Neck supple. No JVD present.   Cardiovascular: Normal rate and regular rhythm.  Exam reveals no gallop and no friction rub.    Murmur heard.   Holosystolic murmur is present with a grade of 2/6   Pulmonary/Chest: Breath sounds normal. No respiratory distress. He has no wheezes. He has no rales. He exhibits no tenderness.   Abdominal: Soft. Bowel sounds are normal. He exhibits no distension and no mass. There is no hepatosplenomegaly, splenomegaly or hepatomegaly. There is no tenderness. There is no rebound, no guarding and no CVA tenderness.   Musculoskeletal: Normal range of motion. He exhibits no  tenderness.   Neurological: He is alert and oriented to person, place, and time. He has normal reflexes. No cranial nerve deficit. He exhibits normal muscle tone. Coordination normal.   Skin: Skin is warm and dry.   Bilateral lower extremity erythema to the anterior shin with prominent varicose veins.   Psychiatric: He has a normal mood and affect.     Assessment:       1. Chronic systolic heart failure    2. Chronic atrial fibrillation    3. Biventricular implantable cardioverter-defibrillator (ICD) in situ    4. Essential hypertension    5. Hypokalemia    6. Acute kidney injury    7. Obesity (BMI 30.0-34.9)    8. Encounter for long-term (current) use of other medications         Plan:     I will prefer to decrease lasix instead valsartan or aldactone    We will continue to follow    RTC 6 months

## 2017-10-05 ENCOUNTER — ANTI-COAG VISIT (OUTPATIENT)
Dept: CARDIOLOGY | Facility: CLINIC | Age: 62
End: 2017-10-05
Payer: MEDICARE

## 2017-10-05 DIAGNOSIS — Z79.01 CURRENT USE OF LONG TERM ANTICOAGULATION: ICD-10-CM

## 2017-10-05 LAB — INR PPP: 2.1 (ref 2–3)

## 2017-10-05 PROCEDURE — 99211 OFF/OP EST MAY X REQ PHY/QHP: CPT | Mod: 25,S$GLB,,

## 2017-10-05 PROCEDURE — 85610 PROTHROMBIN TIME: CPT | Mod: QW,S$GLB,,

## 2017-10-05 NOTE — PROGRESS NOTES
Patient here for close monitoring due to recent low INR. INR good today. No changes reported. No signs or symptoms of bleeding. Continue maintenance dose and Recheck INR in 3 weeks

## 2017-10-26 ENCOUNTER — ANTI-COAG VISIT (OUTPATIENT)
Dept: CARDIOLOGY | Facility: CLINIC | Age: 62
End: 2017-10-26
Payer: MEDICARE

## 2017-10-26 DIAGNOSIS — Z79.01 CURRENT USE OF LONG TERM ANTICOAGULATION: ICD-10-CM

## 2017-10-26 LAB — INR PPP: 2.3 (ref 2–3)

## 2017-10-26 PROCEDURE — 85610 PROTHROMBIN TIME: CPT | Mod: QW,S$GLB,,

## 2017-10-26 PROCEDURE — 99211 OFF/OP EST MAY X REQ PHY/QHP: CPT | Mod: 25,S$GLB,,

## 2017-10-26 NOTE — PROGRESS NOTES
INR good. Patient reports worsening kidney function. He is being worked up by cards and nephrology. He reports decrease in lasix. He denies any other changes. He has bruising. No signs or symptoms of bleeding. INR stable. Maintain current dose and repeat INR in 4 weeks.

## 2017-10-31 DIAGNOSIS — I50.22 CHRONIC SYSTOLIC HEART FAILURE: ICD-10-CM

## 2017-10-31 RX ORDER — VALSARTAN 160 MG/1
TABLET ORAL
Qty: 60 TABLET | Refills: 3 | Status: SHIPPED | OUTPATIENT
Start: 2017-10-31 | End: 2018-02-23 | Stop reason: SDUPTHER

## 2017-11-17 DIAGNOSIS — I50.22 CHF (CONGESTIVE HEART FAILURE), NYHA CLASS III, CHRONIC, SYSTOLIC: ICD-10-CM

## 2017-11-17 RX ORDER — SPIRONOLACTONE 25 MG/1
TABLET ORAL
Qty: 30 TABLET | Refills: 11 | Status: SHIPPED | OUTPATIENT
Start: 2017-11-17 | End: 2018-11-21 | Stop reason: SDUPTHER

## 2017-12-06 NOTE — PROGRESS NOTES
Patient called to reschedule 11/20 missed appointment with the [facility] coumadin  [facility], it was rescheduled to 12/14

## 2017-12-14 ENCOUNTER — ANTI-COAG VISIT (OUTPATIENT)
Dept: CARDIOLOGY | Facility: CLINIC | Age: 62
End: 2017-12-14
Payer: MEDICARE

## 2017-12-14 DIAGNOSIS — Z79.01 CURRENT USE OF LONG TERM ANTICOAGULATION: ICD-10-CM

## 2017-12-14 LAB — INR PPP: 2.1 (ref 2–3)

## 2017-12-14 PROCEDURE — 85610 PROTHROMBIN TIME: CPT | Mod: QW,S$GLB,,

## 2017-12-14 PROCEDURE — 99211 OFF/OP EST MAY X REQ PHY/QHP: CPT | Mod: 25,S$GLB,,

## 2017-12-14 NOTE — PROGRESS NOTES
INR good. Patient reports taking colchicine for gout about 2 weeks ago. No interaction. No other changes. He has bruising on arms. No signs or symptoms of bleeding. Maintain current dose and repeat INR next month

## 2018-01-18 ENCOUNTER — LAB VISIT (OUTPATIENT)
Dept: LAB | Facility: HOSPITAL | Age: 63
End: 2018-01-18
Payer: MEDICARE

## 2018-01-18 ENCOUNTER — ANTI-COAG VISIT (OUTPATIENT)
Dept: CARDIOLOGY | Facility: CLINIC | Age: 63
End: 2018-01-18

## 2018-01-18 DIAGNOSIS — Z79.01 CURRENT USE OF LONG TERM ANTICOAGULATION: ICD-10-CM

## 2018-01-18 DIAGNOSIS — Z79.01 LONG TERM CURRENT USE OF ANTICOAGULANT THERAPY: ICD-10-CM

## 2018-01-18 LAB
INR PPP: 2.7
PROTHROMBIN TIME: 26.4 SEC

## 2018-01-18 PROCEDURE — 36415 COLL VENOUS BLD VENIPUNCTURE: CPT | Mod: PO

## 2018-01-18 PROCEDURE — 85610 PROTHROMBIN TIME: CPT

## 2018-01-27 DIAGNOSIS — I50.22 CHRONIC SYSTOLIC HEART FAILURE: ICD-10-CM

## 2018-01-28 RX ORDER — FUROSEMIDE 80 MG/1
TABLET ORAL
Qty: 30 TABLET | Refills: 5 | Status: SHIPPED | OUTPATIENT
Start: 2018-01-28 | End: 2019-09-12 | Stop reason: SDUPTHER

## 2018-02-07 NOTE — PROGRESS NOTES
Patient called to reschedule 2/22 coumadin clinic appointment due to conflict in schedule, it was rescheduled to 2/26

## 2018-02-23 DIAGNOSIS — I50.22 CHRONIC SYSTOLIC HEART FAILURE: ICD-10-CM

## 2018-02-23 RX ORDER — VALSARTAN 160 MG/1
TABLET ORAL
Qty: 60 TABLET | Refills: 3 | Status: SHIPPED | OUTPATIENT
Start: 2018-02-23 | End: 2018-06-24 | Stop reason: SDUPTHER

## 2018-02-26 ENCOUNTER — ANTI-COAG VISIT (OUTPATIENT)
Dept: CARDIOLOGY | Facility: CLINIC | Age: 63
End: 2018-02-26
Payer: MEDICARE

## 2018-02-26 DIAGNOSIS — Z79.01 CURRENT USE OF LONG TERM ANTICOAGULATION: ICD-10-CM

## 2018-02-26 LAB — INR PPP: 2.7 (ref 2–3)

## 2018-02-26 PROCEDURE — 85610 PROTHROMBIN TIME: CPT | Mod: QW,S$GLB,,

## 2018-02-26 PROCEDURE — 99211 OFF/OP EST MAY X REQ PHY/QHP: CPT | Mod: 25,S$GLB,,

## 2018-02-26 NOTE — PROGRESS NOTES
INR good. Patient reports he was sick beginning of January for about 6 weeks. He took antibiotics from his PCP. He also had a syncopal episode due to dehydration. He was admitted to an outside hospital and held coumadin for 2 days. That was about a month ago. Patient advised to keep us updated with health and medication changes. Today, everything is back to normal and hence stable INR. He has bruising. No signs or symptoms of bleeding. We will continue on maintenance dose and repeat INR next month. Patient again advised to keep us informed of changes, procedures, medications, etc.

## 2018-03-14 ENCOUNTER — OFFICE VISIT (OUTPATIENT)
Dept: TRANSPLANT | Facility: CLINIC | Age: 63
End: 2018-03-14
Payer: MEDICARE

## 2018-03-14 VITALS
HEIGHT: 73 IN | DIASTOLIC BLOOD PRESSURE: 68 MMHG | BODY MASS INDEX: 33.04 KG/M2 | SYSTOLIC BLOOD PRESSURE: 118 MMHG | HEART RATE: 65 BPM | WEIGHT: 249.31 LBS

## 2018-03-14 DIAGNOSIS — Z79.01 CURRENT USE OF LONG TERM ANTICOAGULATION: ICD-10-CM

## 2018-03-14 DIAGNOSIS — Z79.01 ANTICOAGULATION MONITORING BY PHARMACIST: ICD-10-CM

## 2018-03-14 DIAGNOSIS — Z95.810 BIVENTRICULAR IMPLANTABLE CARDIOVERTER-DEFIBRILLATOR (ICD) IN SITU: ICD-10-CM

## 2018-03-14 DIAGNOSIS — I50.22 CHRONIC SYSTOLIC HEART FAILURE: Primary | ICD-10-CM

## 2018-03-14 DIAGNOSIS — Z79.899 ON AMIODARONE THERAPY: ICD-10-CM

## 2018-03-14 DIAGNOSIS — I34.0 SEVERE MITRAL REGURGITATION: ICD-10-CM

## 2018-03-14 PROCEDURE — 99999 PR PBB SHADOW E&M-EST. PATIENT-LVL III: CPT | Mod: PBBFAC,,, | Performed by: INTERNAL MEDICINE

## 2018-03-14 PROCEDURE — 99215 OFFICE O/P EST HI 40 MIN: CPT | Mod: S$GLB,,, | Performed by: INTERNAL MEDICINE

## 2018-03-14 PROCEDURE — 3078F DIAST BP <80 MM HG: CPT | Mod: CPTII,S$GLB,, | Performed by: INTERNAL MEDICINE

## 2018-03-14 PROCEDURE — 3074F SYST BP LT 130 MM HG: CPT | Mod: CPTII,S$GLB,, | Performed by: INTERNAL MEDICINE

## 2018-03-14 NOTE — LETTER
March 14, 2018        Gabriel Howard  [address]  [address]  [place] [address]  Phone: [phone]  Fax: [phone]     Ming Pate  [address] [place] [address] [address]  [place] [address]  Phone: [phone]  Fax: [phone]             [facility]  [address]  [facility] [address]  Phone: [phone]   Patient: Shae Ramesh III   MR Number: [id number]   YOB: 1955   Date of Visit: 3/14/2018       Dear Dr. Ming Pate, Gabriel Howard    Thank you for referring Shae Ramesh to me for evaluation. Attached you will find relevant portions of my assessment and plan of care.    If you have questions, please do not hesitate to call me. I look forward to following Shae Ramesh along with you.    Sincerely,    James Vieyra MD    Enclosure    If you would like to receive this communication electronically, please contact [email or web address] or [phone] to request [employer] Link access.    [employer] Link is a tool which provides read-only access to select patient information with whom you have a relationship. Its easy to use and provides real time access to review your patients record including encounter summaries, notes, results, and demographic information.    If you feel you have received this communication in error or would no longer like to receive these types of communications, please e-mail [email or web address]

## 2018-03-14 NOTE — PROGRESS NOTES
Subjective:    Patient ID:  Shae Ramesh III is a 62 y.o. male who presents for follow-up of Congestive Heart Failure (6mo visit)      Congestive Heart Failure   Pertinent negatives include no nausea or vomiting.       Mr. 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 Doing well no major symptoms.     Review of Systems   Constitution: Negative for decreased appetite, malaise/fatigue, night sweats, weight gain and weight loss.   Cardiovascular: Negative for claudication, cyanosis, dyspnea on exertion, irregular heartbeat, leg swelling, near-syncope, orthopnea and palpitations.   Respiratory: Negative for hemoptysis, shortness of breath, sleep disturbances due to breathing, snoring, sputum production and wheezing.    Skin: Positive for color change.   Gastrointestinal: Negative for diarrhea, nausea and vomiting.            Physical Exam   Constitutional: He is oriented to person, place, and time. He appears well-developed and well-nourished.   HENT:   Head: Normocephalic and atraumatic.   Eyes: Conjunctivae and EOM are normal. Pupils are equal, round, and reactive to light.   Neck: Normal range of motion. Neck supple. No JVD present.   Cardiovascular: Normal rate and regular rhythm.  Exam reveals no gallop and no friction rub.    Murmur heard.   Holosystolic murmur is present with a grade of 2/6   Pulmonary/Chest: Breath sounds normal. No respiratory distress. He has no wheezes. He has no rales. He exhibits no tenderness.   Abdominal: Soft. Bowel sounds are normal. He exhibits no distension and no mass. There is no hepatosplenomegaly, splenomegaly or hepatomegaly. There is no tenderness. There is no rebound, no guarding and no CVA tenderness.   Musculoskeletal: Normal range of motion. He exhibits no  tenderness.   Neurological: He is alert and oriented to person, place, and time. He has normal reflexes. No cranial nerve deficit. He exhibits normal muscle tone. Coordination normal.   Skin: Skin is warm and dry.   Bilateral lower extremity erythema to the anterior shin with prominent varicose veins.   Psychiatric: He has a normal mood and affect.     Assessment:       1. Chronic systolic heart failure    2. Biventricular implantable cardioverter-defibrillator (ICD) in situ    3. On amiodarone therapy    4. Severe mitral regurgitation    5. Anticoagulation monitoring by pharmacist    6. Current use of long term anticoagulation         Plan:     CMP in 4 months Consider entresto. Can be off COumadin for 5 days prior to colosnoscopy    RTC 4 months

## 2018-03-29 ENCOUNTER — CLINICAL SUPPORT (OUTPATIENT)
Dept: ELECTROPHYSIOLOGY | Facility: CLINIC | Age: 63
End: 2018-03-29
Attending: INTERNAL MEDICINE
Payer: MEDICARE

## 2018-03-29 DIAGNOSIS — Z95.810 ICD (IMPLANTABLE CARDIOVERTER-DEFIBRILLATOR) IN PLACE: ICD-10-CM

## 2018-03-29 DIAGNOSIS — I42.0 DILATED CARDIOMYOPATHY: ICD-10-CM

## 2018-03-29 PROCEDURE — 93284 PRGRMG EVAL IMPLANTABLE DFB: CPT | Mod: S$GLB,,, | Performed by: INTERNAL MEDICINE

## 2018-04-02 ENCOUNTER — ANTI-COAG VISIT (OUTPATIENT)
Dept: CARDIOLOGY | Facility: CLINIC | Age: 63
End: 2018-04-02
Payer: MEDICARE

## 2018-04-02 DIAGNOSIS — Z79.01 CURRENT USE OF LONG TERM ANTICOAGULATION: ICD-10-CM

## 2018-04-02 LAB — INR PPP: 2.3 (ref 2–3)

## 2018-04-02 PROCEDURE — 85610 PROTHROMBIN TIME: CPT | Mod: QW,S$GLB,,

## 2018-04-02 NOTE — PROGRESS NOTES
INR good. No changes in medications, health, or diet. Patient has bruising on arms and hands. No signs or symptoms of bleeding. INR stable. Maintain current dose and repeat INR in 5 weeks.

## 2018-04-29 RX ORDER — CARVEDILOL 12.5 MG/1
TABLET ORAL
Qty: 60 TABLET | Refills: 6 | Status: SHIPPED | OUTPATIENT
Start: 2018-04-29 | End: 2018-12-05 | Stop reason: SDUPTHER

## 2018-05-04 DIAGNOSIS — I48.0 PAROXYSMAL ATRIAL FIBRILLATION: ICD-10-CM

## 2018-05-04 DIAGNOSIS — Z79.01 LONG TERM CURRENT USE OF ANTICOAGULANT THERAPY: ICD-10-CM

## 2018-05-04 RX ORDER — WARFARIN SODIUM 5 MG/1
TABLET ORAL
Qty: 30 TABLET | Refills: 5 | Status: SHIPPED | OUTPATIENT
Start: 2018-05-04 | End: 2018-10-04 | Stop reason: SDUPTHER

## 2018-05-07 ENCOUNTER — ANTI-COAG VISIT (OUTPATIENT)
Dept: CARDIOLOGY | Facility: CLINIC | Age: 63
End: 2018-05-07
Payer: MEDICARE

## 2018-05-07 DIAGNOSIS — Z79.01 CURRENT USE OF LONG TERM ANTICOAGULATION: Primary | ICD-10-CM

## 2018-05-07 LAB — INR PPP: 2.5 (ref 2–3)

## 2018-05-07 PROCEDURE — 85610 PROTHROMBIN TIME: CPT | Mod: QW,S$GLB,,

## 2018-05-07 NOTE — PROGRESS NOTES
Patient INR in therapeutic range today. Reports bruising to the arms from use. No bleeding. Will maintain current weekly dose until follow up in 5 weeks. Advised patient to call with any concerns or changes.

## 2018-06-11 ENCOUNTER — ANTI-COAG VISIT (OUTPATIENT)
Dept: CARDIOLOGY | Facility: CLINIC | Age: 63
End: 2018-06-11
Payer: MEDICARE

## 2018-06-11 DIAGNOSIS — Z79.01 CURRENT USE OF LONG TERM ANTICOAGULATION: Primary | ICD-10-CM

## 2018-06-11 LAB — INR PPP: 2.4 (ref 2–3)

## 2018-06-11 PROCEDURE — 85610 PROTHROMBIN TIME: CPT | Mod: QW,S$GLB,,

## 2018-06-11 NOTE — PROGRESS NOTES
Patient INR in therapeutic range today. Reports no bleeding, bruising or other changes. Will maintain current weekly dose until follow up in 5 weeks. Advised patient to call with any concerns or changes.

## 2018-06-12 NOTE — PROGRESS NOTES
Pt seen by Carey ELAM. I have reviewed her initial findings and agree with her assessment and plan

## 2018-06-24 DIAGNOSIS — I50.22 CHRONIC SYSTOLIC HEART FAILURE: ICD-10-CM

## 2018-06-24 RX ORDER — VALSARTAN 160 MG/1
TABLET ORAL
Qty: 60 TABLET | Refills: 3 | Status: SHIPPED | OUTPATIENT
Start: 2018-06-24 | End: 2018-08-01

## 2018-07-10 NOTE — PROGRESS NOTES
Patient called to report that on Friday -7/13 he's scheduled to have a colonoscopy at [facility], reports that ok to hold the coumadin for 5 days was already given by Dr. Vieyra to Dr. Burk so he has been holding since Sunday -7/08, next INR is due 7/19, states he's having labs 7/12 and would like an INR done then too, Patient's call back # [phone]

## 2018-07-12 ENCOUNTER — LAB VISIT (OUTPATIENT)
Dept: LAB | Facility: HOSPITAL | Age: 63
End: 2018-07-12
Attending: INTERNAL MEDICINE
Payer: MEDICARE

## 2018-07-12 ENCOUNTER — TELEPHONE (OUTPATIENT)
Dept: TRANSPLANT | Facility: CLINIC | Age: 63
End: 2018-07-12

## 2018-07-12 ENCOUNTER — ANTI-COAG VISIT (OUTPATIENT)
Dept: CARDIOLOGY | Facility: CLINIC | Age: 63
End: 2018-07-12

## 2018-07-12 ENCOUNTER — OFFICE VISIT (OUTPATIENT)
Dept: TRANSPLANT | Facility: CLINIC | Age: 63
End: 2018-07-12
Payer: MEDICARE

## 2018-07-12 VITALS
SYSTOLIC BLOOD PRESSURE: 122 MMHG | HEART RATE: 65 BPM | DIASTOLIC BLOOD PRESSURE: 76 MMHG | WEIGHT: 248.44 LBS | HEIGHT: 73 IN | BODY MASS INDEX: 32.93 KG/M2

## 2018-07-12 DIAGNOSIS — I50.22 CHRONIC SYSTOLIC HEART FAILURE: ICD-10-CM

## 2018-07-12 DIAGNOSIS — I50.22 CHRONIC SYSTOLIC CONGESTIVE HEART FAILURE: Primary | ICD-10-CM

## 2018-07-12 DIAGNOSIS — E66.9 OBESITY (BMI 30.0-34.9): ICD-10-CM

## 2018-07-12 DIAGNOSIS — Z79.01 CURRENT USE OF LONG TERM ANTICOAGULATION: ICD-10-CM

## 2018-07-12 DIAGNOSIS — I44.7 LBBB (LEFT BUNDLE BRANCH BLOCK): ICD-10-CM

## 2018-07-12 DIAGNOSIS — Z95.810 BIVENTRICULAR IMPLANTABLE CARDIOVERTER-DEFIBRILLATOR (ICD) IN SITU: Primary | ICD-10-CM

## 2018-07-12 DIAGNOSIS — I42.0 DILATED CARDIOMYOPATHY: Chronic | ICD-10-CM

## 2018-07-12 LAB
ALBUMIN SERPL BCP-MCNC: 4.1 G/DL
ALP SERPL-CCNC: 58 U/L
ALT SERPL W/O P-5'-P-CCNC: 17 U/L
ANION GAP SERPL CALC-SCNC: 9 MMOL/L
AST SERPL-CCNC: 16 U/L
BILIRUB SERPL-MCNC: 1.4 MG/DL
BUN SERPL-MCNC: 25 MG/DL
CALCIUM SERPL-MCNC: 9.7 MG/DL
CHLORIDE SERPL-SCNC: 105 MMOL/L
CO2 SERPL-SCNC: 26 MMOL/L
CREAT SERPL-MCNC: 1.4 MG/DL
EST. GFR  (AFRICAN AMERICAN): >60 ML/MIN/1.73 M^2
EST. GFR  (NON AFRICAN AMERICAN): 54.2 ML/MIN/1.73 M^2
GLUCOSE SERPL-MCNC: 141 MG/DL
INR PPP: 1.1
POTASSIUM SERPL-SCNC: 4.4 MMOL/L
PROT SERPL-MCNC: 7.6 G/DL
PROTHROMBIN TIME: 11.4 SEC
SODIUM SERPL-SCNC: 140 MMOL/L

## 2018-07-12 PROCEDURE — 3078F DIAST BP <80 MM HG: CPT | Mod: CPTII,S$GLB,, | Performed by: INTERNAL MEDICINE

## 2018-07-12 PROCEDURE — 3008F BODY MASS INDEX DOCD: CPT | Mod: CPTII,S$GLB,, | Performed by: INTERNAL MEDICINE

## 2018-07-12 PROCEDURE — 99215 OFFICE O/P EST HI 40 MIN: CPT | Mod: S$GLB,,, | Performed by: INTERNAL MEDICINE

## 2018-07-12 PROCEDURE — 3074F SYST BP LT 130 MM HG: CPT | Mod: CPTII,S$GLB,, | Performed by: INTERNAL MEDICINE

## 2018-07-12 PROCEDURE — 36415 COLL VENOUS BLD VENIPUNCTURE: CPT

## 2018-07-12 PROCEDURE — 99999 PR PBB SHADOW E&M-EST. PATIENT-LVL III: CPT | Mod: PBBFAC,,, | Performed by: INTERNAL MEDICINE

## 2018-07-12 PROCEDURE — 80053 COMPREHEN METABOLIC PANEL: CPT

## 2018-07-12 PROCEDURE — 85610 PROTHROMBIN TIME: CPT

## 2018-07-12 NOTE — LETTER
July 12, 2018        Gabriel Howard  [address]  [address]  [place] [address]  Phone: [phone]  Fax: [phone]     Ming Pate  [address] [place] [address] [address]  [place] [address]  Phone: [phone]  Fax: [phone]             [facility]  [address]  [facility] [address]  Phone: [phone]   Patient: Shae Ramesh III   MR Number: [id number]   YOB: 1955   Date of Visit: 7/12/2018       Dear Dr. Ming aPte, Gabriel Howard    Thank you for referring Shae Ramesh to me for evaluation. Attached you will find relevant portions of my assessment and plan of care.    If you have questions, please do not hesitate to call me. I look forward to following Shae Ramesh along with you.    Sincerely,    James Vieyra MD    Enclosure    If you would like to receive this communication electronically, please contact [email or web address] or [phone] to request [employer] access.    [employer] is a tool which provides read-only access to select patient information with whom you have a relationship. Its easy to use and provides real time access to review your patients record including encounter summaries, notes, results, and demographic information.    If you feel you have received this communication in error or would no longer like to receive these types of communications, please e-mail [email or web address]

## 2018-07-12 NOTE — PROGRESS NOTES
Subjective:    Patient ID:  Shae Ramesh III is a 62 y.o. male who presents for follow-up of Congestive Heart Failure      Congestive Heart Failure   Pertinent negatives include no nausea or vomiting.       Mr. 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 Doing well no major symptoms.     Review of Systems   Constitution: Negative for decreased appetite, malaise/fatigue, night sweats, weight gain and weight loss.   Cardiovascular: Negative for claudication, cyanosis, dyspnea on exertion, irregular heartbeat, leg swelling, near-syncope, orthopnea and palpitations.   Respiratory: Negative for hemoptysis, shortness of breath, sleep disturbances due to breathing, snoring, sputum production and wheezing.    Skin: Positive for color change.   Gastrointestinal: Negative for diarrhea, nausea and vomiting.            Physical Exam   Constitutional: He is oriented to person, place, and time. He appears well-developed and well-nourished.   HENT:   Head: Normocephalic and atraumatic.   Eyes: Conjunctivae and EOM are normal. Pupils are equal, round, and reactive to light.   Neck: Normal range of motion. Neck supple. No JVD present.   Cardiovascular: Normal rate and regular rhythm.  Exam reveals no gallop and no friction rub.    Murmur heard.   Holosystolic murmur is present with a grade of 2/6   Pulmonary/Chest: Breath sounds normal. No respiratory distress. He has no wheezes. He has no rales. He exhibits no tenderness.   Abdominal: Soft. Bowel sounds are normal. He exhibits no distension and no mass. There is no hepatosplenomegaly, splenomegaly or hepatomegaly. There is no tenderness. There is no rebound, no guarding and no CVA tenderness.   Musculoskeletal: Normal range of motion. He exhibits no tenderness.    Neurological: He is alert and oriented to person, place, and time. He has normal reflexes. No cranial nerve deficit. He exhibits normal muscle tone. Coordination normal.   Skin: Skin is warm and dry.   Bilateral lower extremity erythema to the anterior shin with prominent varicose veins.   Psychiatric: He has a normal mood and affect.     Assessment:       1. Biventricular implantable cardioverter-defibrillator (ICD) in situ    2. Chronic systolic heart failure    3. Dilated cardiomyopathy    4. LBBB (left bundle branch block)    5. Current use of long term anticoagulation    6. Obesity (BMI 30.0-34.9)         Plan:        Can be off COumadin for 5 days prior to colosnoscopy    RTC 4 months with 2 D echo

## 2018-07-13 ENCOUNTER — DOCUMENTATION ONLY (OUTPATIENT)
Dept: TRANSPLANT | Facility: CLINIC | Age: 63
End: 2018-07-13

## 2018-07-13 NOTE — PROGRESS NOTES
Patient studies reviewed. No recent ischemic evaluation done, however patient is a dilated (non-ischemic) cardiomyopathy patient, who is due to undergo colonoscopy. He was acceptable by Dr. Vieyra to discontinue his coumadin for 5 days before the procedure, however there was not a request for risk assessment for MAC.     Given patient's reduced LVEF, history of ADHF in the past, and severe MR, patient is at increased cardiac risk for an event periprocedurally, however is acceptable to proceed under close surveillance of vitals, blood pressure, volume status as indicated.     Petrona Wong MD  hospitals staff

## 2018-07-13 NOTE — TELEPHONE ENCOUNTER
1115 am:  Received call from Angie with Dr. Burk's office stating pt is there with them, has been prepped for a Colonoscopy , has an IV in and is asking for approval in writing  per his Cardiologist for him to stop taking Coumadin for Colonoscopy and for written approval for MAC anesthesia. I explained to Angie pt should have already stopped taking Coumadin if needed if procedure is today. She told me he did, but she needs this in writing as well.  I noted pt was seen by Dr. Vieyra yesterday and he noted this in his plan pt can stop taking Coumadin for 5 days prior to Colonoscopy.-I told her this.  REgarding the clearance for MAC-I told her Dr. Vieyra is out of the office and I then found out he is not available to me by phone at this time either.  She realized this should have been taken care of before now and said it was an over sight on their part. Told her I would discuss with another provider  And see what we can do. She gave me the direct phone number for Flores who I was told is Dr. Saxena nurse. Angie also told me they have the clearance from 2018 for both things they are requesting, but that has now .  1120 am: I reviewed chart and discussed as above with Dr. Wong. She reviewed chart as well. See her Progress note dated today.  1125 am:  I called and informed Flores Vieyra out today, Dr. Wong addressed their request. Flores told me they have access to view 's note from yesterday and Dr. Wong's today-with me on hold she did this and nothing else needed at this time. She apologized for this not being addressed ahead of time and was thankful all handled today.

## 2018-07-17 ENCOUNTER — CLINICAL SUPPORT (OUTPATIENT)
Dept: ELECTROPHYSIOLOGY | Facility: CLINIC | Age: 63
End: 2018-07-17
Attending: INTERNAL MEDICINE
Payer: MEDICARE

## 2018-07-17 DIAGNOSIS — I42.0 DILATED CARDIOMYOPATHY: ICD-10-CM

## 2018-07-17 DIAGNOSIS — Z95.810 ICD (IMPLANTABLE CARDIOVERTER-DEFIBRILLATOR) IN PLACE: ICD-10-CM

## 2018-07-17 PROCEDURE — 93295 DEV INTERROG REMOTE 1/2/MLT: CPT | Mod: ,,, | Performed by: INTERNAL MEDICINE

## 2018-07-17 PROCEDURE — 93296 REM INTERROG EVL PM/IDS: CPT | Mod: PBBFAC | Performed by: INTERNAL MEDICINE

## 2018-07-19 ENCOUNTER — ANTI-COAG VISIT (OUTPATIENT)
Dept: CARDIOLOGY | Facility: CLINIC | Age: 63
End: 2018-07-19
Payer: MEDICARE

## 2018-07-19 DIAGNOSIS — Z79.01 CURRENT USE OF LONG TERM ANTICOAGULATION: Primary | ICD-10-CM

## 2018-07-19 LAB — INR PPP: 1 (ref 2–3)

## 2018-07-19 PROCEDURE — 85610 PROTHROMBIN TIME: CPT | Mod: QW,S$GLB,, | Performed by: INTERNAL MEDICINE

## 2018-07-19 NOTE — PROGRESS NOTES
Quick follow up post colonoscopy procedure on 7/13. INR low today. Patient reports MD advised patient to hold 7 days post procedure for 1 polyp. Reports no bleeding or bruising. Patient will resume with coumadin today. Will boost coumadin 7/19, 7/20 and 7/21 and resume with weekly dose until follow up in 1 week. Advised patient to call with any changes or concerns. Care Plan made with Glory Stockton.

## 2018-07-26 ENCOUNTER — ANTI-COAG VISIT (OUTPATIENT)
Dept: CARDIOLOGY | Facility: CLINIC | Age: 63
End: 2018-07-26
Payer: MEDICARE

## 2018-07-26 DIAGNOSIS — Z79.01 CURRENT USE OF LONG TERM ANTICOAGULATION: Primary | ICD-10-CM

## 2018-07-26 LAB — INR PPP: 1.2 (ref 2–3)

## 2018-07-26 PROCEDURE — 85610 PROTHROMBIN TIME: CPT | Mod: QW,S$GLB,,

## 2018-07-26 NOTE — PROGRESS NOTES
Quick follow up from low INR on 7/19. INR low today but increasing. Reports no bleeding, bruising or other changes. Will boost 7/26 and 7/27 and resume with weekly dose until follow up in 1 week for close monitoring. Advised patient to call with any changes or concerns. Care Plan made with Anna Parker, Pharm D.

## 2018-08-01 RX ORDER — CANDESARTAN 32 MG/1
32 TABLET ORAL DAILY
Qty: 90 TABLET | Refills: 3 | Status: SHIPPED | OUTPATIENT
Start: 2018-08-01 | End: 2019-07-27 | Stop reason: SDUPTHER

## 2018-08-01 NOTE — TELEPHONE ENCOUNTER
1240 pm:  Returned call to pt. Pt stated he spoke with his pharmacist and was told the Valsartan he is taking is part of the recall. Pt is asking it be changed to another medication . Pt is still currently taking Valsartan. Pt reports he takes : Valsartan 160 mg twice daily.    Reviewing pts chart: see he took Candesartan for a few years when it was stopped January of 2016 due to lack of  availability: he was taking 32 mg daily at that time.   Per : pt to stop taking Valsartan and start Candesartan 32 mg once daily.       ----- Message from Clement Noble sent at 8/1/2018 11:49 AM CDT -----  Contact: self  Pt called in regards of medication and would like to speak with nurse about it,      Pt callback number [phone]

## 2018-08-02 ENCOUNTER — ANTI-COAG VISIT (OUTPATIENT)
Dept: CARDIOLOGY | Facility: CLINIC | Age: 63
End: 2018-08-02

## 2018-08-02 ENCOUNTER — LAB VISIT (OUTPATIENT)
Dept: LAB | Facility: HOSPITAL | Age: 63
End: 2018-08-02
Attending: INTERNAL MEDICINE
Payer: MEDICARE

## 2018-08-02 DIAGNOSIS — Z79.01 CURRENT USE OF LONG TERM ANTICOAGULATION: ICD-10-CM

## 2018-08-02 LAB
INR PPP: 1.7
PROTHROMBIN TIME: 16.4 SEC

## 2018-08-02 PROCEDURE — 36415 COLL VENOUS BLD VENIPUNCTURE: CPT | Mod: PO

## 2018-08-02 PROCEDURE — 85610 PROTHROMBIN TIME: CPT

## 2018-08-09 ENCOUNTER — ANTI-COAG VISIT (OUTPATIENT)
Dept: CARDIOLOGY | Facility: CLINIC | Age: 63
End: 2018-08-09
Payer: MEDICARE

## 2018-08-09 DIAGNOSIS — Z79.01 CURRENT USE OF LONG TERM ANTICOAGULATION: Primary | ICD-10-CM

## 2018-08-09 LAB — INR PPP: 1.5 (ref 2–3)

## 2018-08-09 PROCEDURE — 85610 PROTHROMBIN TIME: CPT | Mod: QW,S$GLB,,

## 2018-08-09 NOTE — PROGRESS NOTES
Quick follow up from low INR on 8/2 and close monitoring for new dose. INR low today for no apparent reason. Patient reports no bleeding, bruising or other changes. Will boost today and increase weekly dose until follow up in 1 week. Advised patient to call with any changes or concerns. Care Plan made with Anna Parker, Pharm D.

## 2018-08-16 ENCOUNTER — LAB VISIT (OUTPATIENT)
Dept: LAB | Facility: HOSPITAL | Age: 63
End: 2018-08-16
Attending: INTERNAL MEDICINE
Payer: MEDICARE

## 2018-08-16 ENCOUNTER — ANTI-COAG VISIT (OUTPATIENT)
Dept: CARDIOLOGY | Facility: CLINIC | Age: 63
End: 2018-08-16

## 2018-08-16 DIAGNOSIS — Z79.01 CURRENT USE OF LONG TERM ANTICOAGULATION: ICD-10-CM

## 2018-08-16 LAB
INR PPP: 2
PROTHROMBIN TIME: 19.9 SEC

## 2018-08-16 PROCEDURE — 36415 COLL VENOUS BLD VENIPUNCTURE: CPT | Mod: PO

## 2018-08-16 PROCEDURE — 85610 PROTHROMBIN TIME: CPT

## 2018-08-24 RX ORDER — WARFARIN SODIUM 5 MG/1
5 TABLET ORAL DAILY
Qty: 120 TABLET | Refills: 3 | Status: SHIPPED | OUTPATIENT
Start: 2018-08-24 | End: 2018-11-26 | Stop reason: SDUPTHER

## 2018-08-30 ENCOUNTER — ANTI-COAG VISIT (OUTPATIENT)
Dept: CARDIOLOGY | Facility: CLINIC | Age: 63
End: 2018-08-30
Payer: MEDICARE

## 2018-08-30 DIAGNOSIS — Z79.01 CURRENT USE OF LONG TERM ANTICOAGULATION: Primary | ICD-10-CM

## 2018-08-30 DIAGNOSIS — I48.91 ATRIAL FIBRILLATION, UNSPECIFIED TYPE: ICD-10-CM

## 2018-08-30 LAB — INR PPP: 2.2 (ref 2–3)

## 2018-08-30 PROCEDURE — 85610 PROTHROMBIN TIME: CPT | Mod: QW,S$GLB,,

## 2018-08-30 PROCEDURE — 99211 OFF/OP EST MAY X REQ PHY/QHP: CPT | Mod: 25,S$GLB,,

## 2018-08-30 NOTE — PROGRESS NOTES
Close monitoring for new dose. Patient INR in therapeutic range today. Reports no bleeding, bruising or other changes. Will maintain current weekly dose until follow up in 2 weeks. Advised patient to call with any concerns or changes.

## 2018-09-13 ENCOUNTER — LAB VISIT (OUTPATIENT)
Dept: LAB | Facility: HOSPITAL | Age: 63
End: 2018-09-13
Attending: INTERNAL MEDICINE
Payer: MEDICARE

## 2018-09-13 ENCOUNTER — ANTI-COAG VISIT (OUTPATIENT)
Dept: CARDIOLOGY | Facility: CLINIC | Age: 63
End: 2018-09-13

## 2018-09-13 DIAGNOSIS — Z79.01 CURRENT USE OF LONG TERM ANTICOAGULATION: ICD-10-CM

## 2018-09-13 LAB
INR PPP: 1.8
PROTHROMBIN TIME: 17.8 SEC

## 2018-09-13 PROCEDURE — 85610 PROTHROMBIN TIME: CPT

## 2018-09-13 PROCEDURE — 36415 COLL VENOUS BLD VENIPUNCTURE: CPT | Mod: PO

## 2018-09-13 NOTE — PROGRESS NOTES
Will review nay changes with patient, bu the has had lower INR's and some subtherapeutic INRs consistently for a past several weeks.  Will increase overall weekly dose today.

## 2018-09-27 ENCOUNTER — ANTI-COAG VISIT (OUTPATIENT)
Dept: CARDIOLOGY | Facility: CLINIC | Age: 63
End: 2018-09-27
Payer: MEDICARE

## 2018-09-27 DIAGNOSIS — Z79.01 CURRENT USE OF LONG TERM ANTICOAGULATION: Primary | ICD-10-CM

## 2018-09-27 LAB — INR PPP: 2 (ref 2–3)

## 2018-09-27 PROCEDURE — 99211 OFF/OP EST MAY X REQ PHY/QHP: CPT | Mod: S$PBB,25,,

## 2018-09-27 PROCEDURE — 85610 PROTHROMBIN TIME: CPT | Mod: PBBFAC,PO

## 2018-09-27 NOTE — PROGRESS NOTES
Quick follow up from low INR on 9/13. Patient INR in therapeutic range today. Reports no bleeding, bruising or other changes. Will maintain new increased dose until follow up in 2 weeks for close monitoring. Advised patient to call with any concerns or changes.

## 2018-10-02 NOTE — PROGRESS NOTES
Mr. Ramesh is a patient of Dr. Cheng and was last seen in clinic 6/9/2017.      Subjective:   Patient ID:  Shae Ramesh III is a 62 y.o. male who presents for follow-up of Atrial Fibrillation  .     HPI:    Mr. Ramesh is a 62 y.o. male with DCM, HFrEF (EF 30-35%), LBBB s/p CRT-D (2013), pAF, severe MR, HTN, dyslipidemia, and obesity here for follow up.     Background:    Patient with DCM and LBBB. CRT-D placed 6/13/2013. Initially following CRT-D placement, Mr. Ramesh experienced improvement in his EF, but ultimately, it worsened and he developed some CHF sx.   6/2015 started on amiodarone and aldactone due to AF and apparent R-sided overload.   While on 400 mg of amiodaroine daily, his legs became swollen and discolored w/associated SOB. A BNP at the time (07/2016) was 792 (usually 100). Aldactone was increased to 25 bid on in July of 2016, after which he noted symptomatic improvement.   Mr. Ramesh was seen by Dr. Castle, re: mitraclradha and was initially deemed a good candidate but Dr. Vieyra was max'ing his med Rx, and at the time, Mr. Ramesh had been doing a lot better, and at the time, his MR appeared a bit less by Echo. This has been deferred.     Update (10/04/2018):    Today he says he has been doing well. He is down to 40mg lasix daily. No significant CHF exacerbations recently. No significant SOB. No palpitations,light-headedness, and syncope.     He is currently taking coumadin for stroke prophylaxis and denies significant bleeding episodes. He is currently being treated carvedilol 12.5mg BID for HR control.  Kidney function is stable, with a creatinine of 1.4 on 7/12/2018. LFTs are WNL 7/2018. Needs recent TSH. No PFTs on file. He has regular annual eye examinations. He has not taken his amiodarone in months - he isn't sure whether it was discontinued or whether he just never got refills from his pharmacist.     Device Interrogation (10/4/2018) reveals an intrinsic sinus rhythm with stable lead  and device function. SVTx1, 10 seconds. NSVT x2 (egrams consistent with ST/AT). No AF. He paces 17% in the RA and 94% in the BiV. 4% PVC burden. Estimated battery longevity 2.9V.    I have personally reviewed the patient's EKG today, which shows biV pacing at 68bpm. AK interval is 166. QRS is 132. QTc is 497.    Recent Cardiac Tests:    2D Echo (9/29/2017):  CONCLUSIONS     1 - Severely depressed left ventricular systolic function (EF 25-30%).     2 - Eccentric hypertrophy.     3 - Biatrial enlargement.     4 - Low normal right ventricular systolic function .     5 - The estimated PA systolic pressure is 30 mmHg.     6 - Severe mitral regurgitation.     7 - Mild tricuspid regurgitation.     Current Outpatient Medications   Medication Sig    allopurinol (ZYLOPRIM) 100 MG tablet 100 mg once daily.     alprazolam (XANAX XR) 0.5 MG 24 hr tablet Take 0.5 mg by mouth as needed.     candesartan (ATACAND) 32 MG tablet Take 1 tablet (32 mg total) by mouth once daily.    carvedilol (COREG) 12.5 MG tablet take 1 tablet by mouth twice a day    colchicine 0.6 mg tablet take 1 tablet by mouth twice a day if needed for gout pain    furosemide (LASIX) 80 MG tablet take 1 tablet by mouth once daily (Patient taking differently: take 1/2 tablet by mouth once daily)    spironolactone (ALDACTONE) 25 MG tablet take 1 tablet by mouth once daily    warfarin (COUMADIN) 5 MG tablet Take 1 tablet (5 mg total) by mouth Daily. Current Dose ( 7.5 mg on Mon, Wed, Fri; 5 mg all other days) or as directed by coumadin clinic.    amiodarone (PACERONE) 200 MG Tab take 1 tablet by mouth twice a day for 1 MONTH then take 1 tablet once daily Patient not taking    warfarin (COUMADIN) 3 MG tablet take 1 tablet by mouth once daily     No current facility-administered medications for this visit.      Review of Systems   Constitution: Negative for malaise/fatigue.   Cardiovascular: Negative for chest pain, dyspnea on exertion, irregular heartbeat,  "leg swelling and palpitations.   Respiratory: Negative for shortness of breath.    Hematologic/Lymphatic: Negative for bleeding problem.   Skin: Negative for rash.   Musculoskeletal: Negative for myalgias.   Gastrointestinal: Negative for hematemesis, hematochezia and nausea.   Genitourinary: Negative for hematuria.   Neurological: Negative for light-headedness.   Psychiatric/Behavioral: Negative for altered mental status.   Allergic/Immunologic: Negative for persistent infections.     Objective:        /82   Pulse 68   Ht 6' 1" (1.854 m)   Wt 111.7 kg (246 lb 4.1 oz)   BMI 32.49 kg/m²     Physical Exam   Constitutional: He is oriented to person, place, and time. He appears well-developed and well-nourished.   HENT:   Head: Normocephalic.   Nose: Nose normal.   Eyes: Pupils are equal, round, and reactive to light.   Cardiovascular: Normal rate, regular rhythm, S1 normal and S2 normal.   Murmur heard.  High-pitched blowing holosystolic murmur is present with a grade of 2/6 at the apex.  Pulses:       Radial pulses are 2+ on the right side, and 2+ on the left side.   Pulmonary/Chest: Breath sounds normal. No respiratory distress.   Device to LUCW   Abdominal: Normal appearance.   Musculoskeletal: Normal range of motion. He exhibits no edema.   Neurological: He is alert and oriented to person, place, and time.   Skin: Skin is warm and dry. No erythema.   Psychiatric: He has a normal mood and affect. His speech is normal and behavior is normal.   Nursing note and vitals reviewed.    Lab Results   Component Value Date     07/12/2018    K 4.4 07/12/2018    MG 1.9 09/29/2017    BUN 25 (H) 07/12/2018    CREATININE 1.4 07/12/2018    ALT 17 07/12/2018    AST 16 07/12/2018    HGB 16.5 09/30/2015    HCT 47.4 09/30/2015    TSH 0.796 06/19/2017    LDLCALC 147.0 06/10/2013       Recent Labs   Lab  08/16/18   1035  08/30/18   1056  09/13/18   0842  09/27/18   1019   INR  2.0 H  2.2  1.8 H  2.0       Assessment: "     1. Atrial fibrillation, unspecified type    2. LBBB (left bundle branch block)    3. Biventricular implantable cardioverter-defibrillator (ICD) in situ    4. On amiodarone therapy    5. Current use of long term anticoagulation    6. Obesity (BMI 30.0-34.9)    7. Severe mitral regurgitation    8. Dilated cardiomyopathy    9. Essential hypertension      Plan:     In summary, Mr. Ramesh is a 62 y.o. male with DCM, HFrEF (EF 30-35%), LBBB s/p CRT-D (2013), pAF, severe MR, HTN, dyslipidemia, and obesity here for follow up.   He has not been taking amiodarone for AF for many months (possibly the past year). No AF seen on device interrogation or remote reports over the past year. He remains on coumadin for CVA prophylaxis. Will confirm with Dr. Cheng that he can remain off AAD for now. He is biV pacing 94%. He is on GDMT: BB, ARB, spironolactone. Followed closely by Dr. Vieyra. Has had BPs in the 80s systolic recently - cannot up titrate coreg at this time. No recent CHF exacerbations - MitraClip continues to be deferred.     Continue current medications.  Routine device checks as scheduled.   RTC in one year, sooner if needed.    *A copy of this note has been sent to Dr. Cheng*    Follow-up in about 1 year (around 10/4/2019).    ------------------------------------------------------------------    SINDHU Chopra, NP-C  [facility]

## 2018-10-03 DIAGNOSIS — I48.0 PAF (PAROXYSMAL ATRIAL FIBRILLATION): Primary | ICD-10-CM

## 2018-10-04 ENCOUNTER — OFFICE VISIT (OUTPATIENT)
Dept: ELECTROPHYSIOLOGY | Facility: CLINIC | Age: 63
End: 2018-10-04
Payer: MEDICARE

## 2018-10-04 ENCOUNTER — CLINICAL SUPPORT (OUTPATIENT)
Dept: ELECTROPHYSIOLOGY | Facility: CLINIC | Age: 63
End: 2018-10-04
Payer: MEDICARE

## 2018-10-04 ENCOUNTER — HOSPITAL ENCOUNTER (OUTPATIENT)
Dept: CARDIOLOGY | Facility: CLINIC | Age: 63
Discharge: HOME | End: 2018-10-04
Payer: MEDICARE

## 2018-10-04 VITALS
BODY MASS INDEX: 32.64 KG/M2 | WEIGHT: 246.25 LBS | SYSTOLIC BLOOD PRESSURE: 108 MMHG | HEART RATE: 68 BPM | DIASTOLIC BLOOD PRESSURE: 82 MMHG | HEIGHT: 73 IN

## 2018-10-04 DIAGNOSIS — I10 ESSENTIAL HYPERTENSION: Chronic | ICD-10-CM

## 2018-10-04 DIAGNOSIS — I34.0 SEVERE MITRAL REGURGITATION: ICD-10-CM

## 2018-10-04 DIAGNOSIS — Z79.899 ON AMIODARONE THERAPY: ICD-10-CM

## 2018-10-04 DIAGNOSIS — I48.0 PAF (PAROXYSMAL ATRIAL FIBRILLATION): ICD-10-CM

## 2018-10-04 DIAGNOSIS — I48.91 ATRIAL FIBRILLATION, UNSPECIFIED TYPE: Primary | ICD-10-CM

## 2018-10-04 DIAGNOSIS — I44.7 LBBB (LEFT BUNDLE BRANCH BLOCK): ICD-10-CM

## 2018-10-04 DIAGNOSIS — Z95.810 BIVENTRICULAR IMPLANTABLE CARDIOVERTER-DEFIBRILLATOR (ICD) IN SITU: ICD-10-CM

## 2018-10-04 DIAGNOSIS — Z79.01 CURRENT USE OF LONG TERM ANTICOAGULATION: ICD-10-CM

## 2018-10-04 DIAGNOSIS — I42.0 DILATED CARDIOMYOPATHY: ICD-10-CM

## 2018-10-04 DIAGNOSIS — E66.9 OBESITY (BMI 30.0-34.9): ICD-10-CM

## 2018-10-04 DIAGNOSIS — I42.0 DILATED CARDIOMYOPATHY: Chronic | ICD-10-CM

## 2018-10-04 DIAGNOSIS — Z95.810 ICD (IMPLANTABLE CARDIOVERTER-DEFIBRILLATOR) IN PLACE: ICD-10-CM

## 2018-10-04 PROCEDURE — 99999 PR PBB SHADOW E&M-EST. PATIENT-LVL III: CPT | Mod: PBBFAC,,, | Performed by: NURSE PRACTITIONER

## 2018-10-04 PROCEDURE — 93010 ELECTROCARDIOGRAM REPORT: CPT | Mod: S$PBB,,, | Performed by: INTERNAL MEDICINE

## 2018-10-04 PROCEDURE — 3079F DIAST BP 80-89 MM HG: CPT | Mod: CPTII,,, | Performed by: NURSE PRACTITIONER

## 2018-10-04 PROCEDURE — 99213 OFFICE O/P EST LOW 20 MIN: CPT | Mod: PBBFAC,25 | Performed by: NURSE PRACTITIONER

## 2018-10-04 PROCEDURE — 3074F SYST BP LT 130 MM HG: CPT | Mod: CPTII,,, | Performed by: NURSE PRACTITIONER

## 2018-10-04 PROCEDURE — 93284 PRGRMG EVAL IMPLANTABLE DFB: CPT | Mod: PBBFAC | Performed by: INTERNAL MEDICINE

## 2018-10-04 PROCEDURE — 99214 OFFICE O/P EST MOD 30 MIN: CPT | Mod: S$PBB,,, | Performed by: NURSE PRACTITIONER

## 2018-10-04 PROCEDURE — 3008F BODY MASS INDEX DOCD: CPT | Mod: CPTII,,, | Performed by: NURSE PRACTITIONER

## 2018-10-04 PROCEDURE — 93005 ELECTROCARDIOGRAM TRACING: CPT | Mod: PBBFAC,59 | Performed by: INTERNAL MEDICINE

## 2018-10-04 NOTE — Clinical Note
He has not been taking amiodarone in many months - he thinks maybe the whole year - and no AF on device reports. Ok to leave him off for now?

## 2018-10-11 ENCOUNTER — LAB VISIT (OUTPATIENT)
Dept: LAB | Facility: HOSPITAL | Age: 63
End: 2018-10-11
Attending: INTERNAL MEDICINE
Payer: MEDICARE

## 2018-10-11 ENCOUNTER — ANTI-COAG VISIT (OUTPATIENT)
Dept: CARDIOLOGY | Facility: CLINIC | Age: 63
End: 2018-10-11

## 2018-10-11 DIAGNOSIS — Z79.01 CURRENT USE OF LONG TERM ANTICOAGULATION: ICD-10-CM

## 2018-10-11 DIAGNOSIS — I48.91 ATRIAL FIBRILLATION, UNSPECIFIED TYPE: ICD-10-CM

## 2018-10-11 LAB
INR PPP: 1.7
PROTHROMBIN TIME: 16.3 SEC

## 2018-10-11 PROCEDURE — 36415 COLL VENOUS BLD VENIPUNCTURE: CPT | Mod: PO

## 2018-10-11 PROCEDURE — 85610 PROTHROMBIN TIME: CPT

## 2018-10-25 ENCOUNTER — ANTI-COAG VISIT (OUTPATIENT)
Dept: CARDIOLOGY | Facility: CLINIC | Age: 63
End: 2018-10-25
Payer: MEDICARE

## 2018-10-25 DIAGNOSIS — Z79.01 CURRENT USE OF LONG TERM ANTICOAGULATION: Primary | ICD-10-CM

## 2018-10-25 LAB — INR PPP: 2 (ref 2–3)

## 2018-10-25 PROCEDURE — 85610 PROTHROMBIN TIME: CPT | Mod: PBBFAC,PO

## 2018-10-25 NOTE — PROGRESS NOTES
Quick follow up from low INR on 10/11. INR in therapeutic range. Patient reports bruising to the arms from use. No bleeding. Will maintain new increased dose until follow up in 2 weeks for close monitoring. Advised patient to call with any changes or concerns.

## 2018-11-19 ENCOUNTER — OFFICE VISIT (OUTPATIENT)
Dept: TRANSPLANT | Facility: CLINIC | Age: 63
End: 2018-11-19
Payer: MEDICARE

## 2018-11-19 ENCOUNTER — HOSPITAL ENCOUNTER (OUTPATIENT)
Dept: CARDIOLOGY | Facility: CLINIC | Age: 63
Discharge: HOME | End: 2018-11-19
Attending: INTERNAL MEDICINE
Payer: MEDICARE

## 2018-11-19 VITALS
BODY MASS INDEX: 32.87 KG/M2 | DIASTOLIC BLOOD PRESSURE: 52 MMHG | SYSTOLIC BLOOD PRESSURE: 92 MMHG | WEIGHT: 248 LBS | HEIGHT: 73 IN | HEART RATE: 67 BPM

## 2018-11-19 VITALS
WEIGHT: 246 LBS | HEIGHT: 73 IN | HEART RATE: 75 BPM | DIASTOLIC BLOOD PRESSURE: 80 MMHG | SYSTOLIC BLOOD PRESSURE: 112 MMHG | BODY MASS INDEX: 32.6 KG/M2

## 2018-11-19 DIAGNOSIS — I48.0 PAROXYSMAL ATRIAL FIBRILLATION: Primary | ICD-10-CM

## 2018-11-19 DIAGNOSIS — I42.0 DILATED CARDIOMYOPATHY: Chronic | ICD-10-CM

## 2018-11-19 DIAGNOSIS — I50.22 CHRONIC SYSTOLIC CONGESTIVE HEART FAILURE: ICD-10-CM

## 2018-11-19 DIAGNOSIS — I50.22 CHRONIC SYSTOLIC HEART FAILURE: ICD-10-CM

## 2018-11-19 DIAGNOSIS — Z95.810 BIVENTRICULAR IMPLANTABLE CARDIOVERTER-DEFIBRILLATOR (ICD) IN SITU: ICD-10-CM

## 2018-11-19 DIAGNOSIS — I44.7 LBBB (LEFT BUNDLE BRANCH BLOCK): ICD-10-CM

## 2018-11-19 DIAGNOSIS — I10 ESSENTIAL HYPERTENSION: Chronic | ICD-10-CM

## 2018-11-19 LAB
ASCENDING AORTA: 3.96 CM
AV MEAN GRADIENT: 5.52 MMHG
AV PEAK GRADIENT: 9.73 MMHG
AV VALVE AREA: 2.13 CM2
BSA FOR ECHO PROCEDURE: 2.4 M2
CV ECHO LV RWT: 0.27 CM
DOP CALC AO PEAK VEL: 1.56 M/S
DOP CALC AO VTI: 28.76 CM
DOP CALC LVOT AREA: 5.14 CM2
DOP CALC LVOT DIAMETER: 2.56 CM
DOP CALC LVOT STROKE VOLUME: 61.22 CM3
DOP CALCLVOT PEAK VEL VTI: 11.9 CM
E WAVE DECELERATION TIME: 201.31 MSEC
E/A RATIO: 0.6
E/E' RATIO: 13.56
ECHO LV POSTERIOR WALL: 1 CM (ref 0.6–1.1)
FRACTIONAL SHORTENING: 16 % (ref 28–44)
INTERVENTRICULAR SEPTUM: 0.9 CM (ref 0.6–1.1)
IVRT: 0.13 MSEC
LA MAJOR: 6.39 CM
LA MINOR: 6.21 CM
LA WIDTH: 3.84 CM
LEFT ATRIUM SIZE: 5.23 CM
LEFT ATRIUM VOLUME INDEX: 44.8 ML/M2
LEFT ATRIUM VOLUME: 107.52 CM3
LEFT INTERNAL DIMENSION IN SYSTOLE: 6.26 CM (ref 2.1–4)
LEFT VENTRICLE DIASTOLIC VOLUME INDEX: 122.61 ML/M2
LEFT VENTRICLE DIASTOLIC VOLUME: 294.26 ML
LEFT VENTRICLE MASS INDEX: 140.3 G/M2
LEFT VENTRICLE SYSTOLIC VOLUME INDEX: 82.7 ML/M2
LEFT VENTRICLE SYSTOLIC VOLUME: 198.41 ML
LEFT VENTRICULAR INTERNAL DIMENSION IN DIASTOLE: 7.45 CM (ref 3.5–6)
LEFT VENTRICULAR MASS: 336.65 G
LV LATERAL E/E' RATIO: 15.25
LV SEPTAL E/E' RATIO: 12.2
MV PEAK A VEL: 1.01 M/S
MV PEAK E VEL: 0.61 M/S
PISA TR MAX VEL: 2.14 M/S
PISA VN NYQUIST MS: 0.32 M/S
PULM VEIN S/D RATIO: 2.73
PV PEAK D VEL: 0.33 M/S
PV PEAK S VEL: 0.9 M/S
RA MAJOR: 5.07 CM
RA PRESSURE: 3 MMHG
RA WIDTH: 3.92 CM
RIGHT VENTRICULAR END-DIASTOLIC DIMENSION: 3.35 CM
RV TISSUE DOPPLER FREE WALL SYSTOLIC VELOCITY 1 (APICAL 4 CHAMBER VIEW): 10.13 M/S
SINUS: 3.66 CM
STJ: 3 CM
TDI LATERAL: 0.04
TDI SEPTAL: 0.05
TDI: 0.05
TR MAX PG: 18.32 MMHG
TRICUSPID ANNULAR PLANE SYSTOLIC EXCURSION: 1.95 CM
TV REST PULMONARY ARTERY PRESSURE: 21.32 MMHG

## 2018-11-19 PROCEDURE — 3074F SYST BP LT 130 MM HG: CPT | Mod: CPTII,S$GLB,, | Performed by: INTERNAL MEDICINE

## 2018-11-19 PROCEDURE — 93306 TTE W/DOPPLER COMPLETE: CPT | Mod: S$GLB,,, | Performed by: INTERNAL MEDICINE

## 2018-11-19 PROCEDURE — 99215 OFFICE O/P EST HI 40 MIN: CPT | Mod: S$GLB,,, | Performed by: INTERNAL MEDICINE

## 2018-11-19 PROCEDURE — 3078F DIAST BP <80 MM HG: CPT | Mod: CPTII,S$GLB,, | Performed by: INTERNAL MEDICINE

## 2018-11-19 PROCEDURE — 3008F BODY MASS INDEX DOCD: CPT | Mod: CPTII,S$GLB,, | Performed by: INTERNAL MEDICINE

## 2018-11-19 PROCEDURE — 99999 PR PBB SHADOW E&M-EST. PATIENT-LVL III: CPT | Mod: PBBFAC,,, | Performed by: INTERNAL MEDICINE

## 2018-11-19 NOTE — LETTER
November 19, 2018        Gabriel Howard  [address]  [address]  [place] [address]  Phone: [phone]  Fax: [phone]     Ming Pate  [address] [place] [address] [address]  [place] [address]  Phone: [phone]  Fax: [phone]             [facility]  [address]  [facility] [address]  Phone: [phone]   Patient: Shae Ramesh III   MR Number: [id number]   YOB: 1955   Date of Visit: 11/19/2018       Dear Dr. Ming Pate, Gabriel Howard    Thank you for referring Shae Ramesh to me for evaluation. Attached you will find relevant portions of my assessment and plan of care.    If you have questions, please do not hesitate to call me. I look forward to following Shae Ramesh along with you.    Sincerely,    James Vieyra MD    Enclosure    If you would like to receive this communication electronically, please contact [email or web address] or [phone] to request [employer] access.    [employer] is a tool which provides read-only access to select patient information with whom you have a relationship. Its easy to use and provides real time access to review your patients record including encounter summaries, notes, results, and demographic information.    If you feel you have received this communication in error or would no longer like to receive these types of communications, please e-mail [email or web address]

## 2018-11-19 NOTE — PROGRESS NOTES
Subjective:    Patient ID:  Shae Ramesh III is a 62 y.o. male who presents for follow-up of Congestive Heart Failure      Congestive Heart Failure   Pertinent negatives include no nausea or vomiting.       Mr. 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 Doing well no major symptoms.     · Moderate left atrial enlargement.  · Severe left ventricular enlargement.  · Eccentric left ventricular hypertrophy.  · Severely decreased left ventricular systolic function. The estimated ejection fraction is 25%  · Global hypokinetic wall motion.  · Grade I (mild) left ventricular diastolic dysfunction consistent with impaired relaxation.  · Normal right ventricular systolic function.  · Moderate-to-severe mitral regurgitation eccentric jet directed posteriorly.  · Mild tricuspid regurgitation.  · Mild right atrial enlargement.  · Normal central venous pressure (3 mm Hg).  · The estimated PA systolic pressure is 21.32 mm Hg  · No pericardial effusion.    Review of Systems   Constitution: Negative for decreased appetite, malaise/fatigue, night sweats, weight gain and weight loss.   Cardiovascular: Negative for claudication, cyanosis, dyspnea on exertion, irregular heartbeat, leg swelling, near-syncope, orthopnea and palpitations.   Respiratory: Negative for hemoptysis, shortness of breath, sleep disturbances due to breathing, snoring, sputum production and wheezing.    Skin: Positive for color change.   Gastrointestinal: Negative for diarrhea, nausea and vomiting.            Physical Exam   Constitutional: He is oriented to person, place, and time. He appears well-developed and well-nourished.   HENT:   Head: Normocephalic and atraumatic.   Eyes: Conjunctivae and EOM are normal. Pupils are equal, round, and reactive  to light.   Neck: Normal range of motion. Neck supple. No JVD present.   Cardiovascular: Normal rate and regular rhythm. Exam reveals no gallop and no friction rub.   Murmur heard.   Holosystolic murmur is present with a grade of 2/6.  Pulmonary/Chest: Breath sounds normal. No respiratory distress. He has no wheezes. He has no rales. He exhibits no tenderness.   Abdominal: Soft. Bowel sounds are normal. He exhibits no distension and no mass. There is no hepatosplenomegaly, splenomegaly or hepatomegaly. There is no tenderness. There is no rebound, no guarding and no CVA tenderness.   Musculoskeletal: Normal range of motion. He exhibits no tenderness.   Neurological: He is alert and oriented to person, place, and time. He has normal reflexes. No cranial nerve deficit. He exhibits normal muscle tone. Coordination normal.   Skin: Skin is warm and dry.   Bilateral lower extremity erythema to the anterior shin with prominent varicose veins.   Psychiatric: He has a normal mood and affect.     Assessment:       1. Paroxysmal atrial fibrillation    2. Biventricular implantable cardioverter-defibrillator (ICD) in situ    3. Chronic systolic heart failure    4. Dilated cardiomyopathy    5. Essential hypertension    6. LBBB (left bundle branch block)         Plan:     Still MR despite medical therapy but he is asymptomatic. May require mitral clip??    RTC 4 months with CPX

## 2018-11-21 DIAGNOSIS — I50.22 CHF (CONGESTIVE HEART FAILURE), NYHA CLASS III, CHRONIC, SYSTOLIC: ICD-10-CM

## 2018-11-21 RX ORDER — SPIRONOLACTONE 25 MG/1
TABLET ORAL
Qty: 30 TABLET | Refills: 0 | Status: SHIPPED | OUTPATIENT
Start: 2018-11-21 | End: 2018-12-18 | Stop reason: SDUPTHER

## 2018-11-26 DIAGNOSIS — Z79.01 CURRENT USE OF LONG TERM ANTICOAGULATION: ICD-10-CM

## 2018-11-26 RX ORDER — WARFARIN SODIUM 5 MG/1
TABLET ORAL
Qty: 120 TABLET | Refills: 3 | Status: SHIPPED | OUTPATIENT
Start: 2018-11-26 | End: 2019-02-07 | Stop reason: SDUPTHER

## 2018-12-05 RX ORDER — CARVEDILOL 12.5 MG/1
12.5 TABLET ORAL 2 TIMES DAILY
Qty: 60 TABLET | Refills: 6 | Status: SHIPPED | OUTPATIENT
Start: 2018-12-05 | End: 2019-08-26 | Stop reason: SDUPTHER

## 2018-12-10 ENCOUNTER — ANTI-COAG VISIT (OUTPATIENT)
Dept: CARDIOLOGY | Facility: CLINIC | Age: 63
End: 2018-12-10

## 2018-12-10 ENCOUNTER — LAB VISIT (OUTPATIENT)
Dept: LAB | Facility: HOSPITAL | Age: 63
End: 2018-12-10
Attending: INTERNAL MEDICINE
Payer: MEDICARE

## 2018-12-10 DIAGNOSIS — Z79.01 CURRENT USE OF LONG TERM ANTICOAGULATION: ICD-10-CM

## 2018-12-10 LAB
INR PPP: 2.3
PROTHROMBIN TIME: 22.4 SEC

## 2018-12-10 PROCEDURE — 36415 COLL VENOUS BLD VENIPUNCTURE: CPT | Mod: PO

## 2018-12-10 PROCEDURE — 85610 PROTHROMBIN TIME: CPT

## 2018-12-18 DIAGNOSIS — I50.22 CHF (CONGESTIVE HEART FAILURE), NYHA CLASS III, CHRONIC, SYSTOLIC: ICD-10-CM

## 2018-12-19 RX ORDER — SPIRONOLACTONE 25 MG/1
TABLET ORAL
Qty: 30 TABLET | Refills: 0 | Status: SHIPPED | OUTPATIENT
Start: 2018-12-19 | End: 2019-01-18 | Stop reason: SDUPTHER

## 2018-12-27 ENCOUNTER — ANTI-COAG VISIT (OUTPATIENT)
Dept: CARDIOLOGY | Facility: CLINIC | Age: 63
End: 2018-12-27
Payer: MEDICARE

## 2018-12-27 DIAGNOSIS — Z79.01 CURRENT USE OF LONG TERM ANTICOAGULATION: ICD-10-CM

## 2018-12-27 LAB — INR PPP: 2.1 (ref 2–3)

## 2018-12-27 PROCEDURE — 99211 OFF/OP EST MAY X REQ PHY/QHP: CPT | Mod: 25,S$GLB,,

## 2018-12-27 PROCEDURE — 85610 PROTHROMBIN TIME: CPT | Mod: QW,S$GLB,,

## 2018-12-27 NOTE — PROGRESS NOTES
Patient here for close monitoring due to dose change. INR good. Dose had to be increased after cscope in July and was stable lower prior to the cscope. He then missed appointments and was not followed closely on new dose. INR is staying good on new dose. In talking to patient and reviewing MD notes, he stopped amiodarone earlier this year. We did not know about the change. It likely coincided that the amio was clearing about the time of the cscope. We will continue on current dose plan and repeat INR next month.

## 2019-01-14 ENCOUNTER — CLINICAL SUPPORT (OUTPATIENT)
Dept: CARDIOLOGY | Facility: HOSPITAL | Age: 64
End: 2019-01-14
Attending: INTERNAL MEDICINE
Payer: MEDICARE

## 2019-01-14 DIAGNOSIS — Z95.810 AICD (AUTOMATIC CARDIOVERTER/DEFIBRILLATOR) PRESENT: Primary | ICD-10-CM

## 2019-01-14 DIAGNOSIS — Z95.810 AICD (AUTOMATIC CARDIOVERTER/DEFIBRILLATOR) PRESENT: ICD-10-CM

## 2019-01-14 PROCEDURE — 93296 REM INTERROG EVL PM/IDS: CPT

## 2019-01-18 DIAGNOSIS — I50.22 CHF (CONGESTIVE HEART FAILURE), NYHA CLASS III, CHRONIC, SYSTOLIC: ICD-10-CM

## 2019-01-18 RX ORDER — SPIRONOLACTONE 25 MG/1
TABLET ORAL
Qty: 30 TABLET | Refills: 0 | Status: SHIPPED | OUTPATIENT
Start: 2019-01-18 | End: 2019-02-17 | Stop reason: SDUPTHER

## 2019-01-31 ENCOUNTER — ANTI-COAG VISIT (OUTPATIENT)
Dept: CARDIOLOGY | Facility: CLINIC | Age: 64
End: 2019-01-31
Payer: MEDICARE

## 2019-01-31 DIAGNOSIS — Z79.01 CURRENT USE OF LONG TERM ANTICOAGULATION: ICD-10-CM

## 2019-01-31 LAB — INR PPP: 1.9 (ref 2–3)

## 2019-01-31 PROCEDURE — 99211 OFF/OP EST MAY X REQ PHY/QHP: CPT | Mod: 25,S$GLB,,

## 2019-01-31 PROCEDURE — 85610 POCT INR: ICD-10-PCS | Mod: QW,S$GLB,,

## 2019-01-31 PROCEDURE — 85610 PROTHROMBIN TIME: CPT | Mod: QW,S$GLB,,

## 2019-01-31 PROCEDURE — 99211 PR OFFICE/OUTPT VISIT, EST, LEVL I: ICD-10-PCS | Mod: 25,S$GLB,,

## 2019-01-31 NOTE — PROGRESS NOTES
INR a tad low today at 1.9. He denies changes. No signs or symptoms of bleeding. INR has been trending on the low side of range in the past few checks. Also noted at one point he used to alternate 8mg/5mg dose and was stable. We will increase dose slightly. Recheck INR in 2 weeks

## 2019-02-07 DIAGNOSIS — Z79.01 CURRENT USE OF LONG TERM ANTICOAGULATION: Primary | ICD-10-CM

## 2019-02-07 RX ORDER — WARFARIN 3 MG/1
TABLET ORAL
Qty: 30 TABLET | Refills: 5 | Status: SHIPPED | OUTPATIENT
Start: 2019-02-07 | End: 2019-05-28 | Stop reason: SDUPTHER

## 2019-02-07 RX ORDER — WARFARIN SODIUM 5 MG/1
TABLET ORAL
Qty: 120 TABLET | Refills: 3 | Status: SHIPPED | OUTPATIENT
Start: 2019-02-07 | End: 2019-05-29 | Stop reason: SDUPTHER

## 2019-02-08 RX ORDER — WARFARIN SODIUM 5 MG/1
7.5 TABLET ORAL DAILY
Qty: 120 TABLET | Refills: 3 | Status: SHIPPED | OUTPATIENT
Start: 2019-02-08 | End: 2020-02-08

## 2019-02-17 DIAGNOSIS — I50.22 CHF (CONGESTIVE HEART FAILURE), NYHA CLASS III, CHRONIC, SYSTOLIC: ICD-10-CM

## 2019-02-17 RX ORDER — SPIRONOLACTONE 25 MG/1
TABLET ORAL
Qty: 90 TABLET | Refills: 0 | Status: SHIPPED | OUTPATIENT
Start: 2019-02-17 | End: 2019-05-14 | Stop reason: SDUPTHER

## 2019-02-25 NOTE — PROGRESS NOTES
Pt stated that he cancelled his appointment because he ran out of pills and he missed a few days. He started back on his coumadin meds and he stated that it did not make any sense to go and get checked because he knew he was going to be off. The patient stated that he is out of town for [place] and he wants something after. Offered the patient 3/11/19 in the am.

## 2019-03-11 ENCOUNTER — ANTI-COAG VISIT (OUTPATIENT)
Dept: CARDIOLOGY | Facility: CLINIC | Age: 64
End: 2019-03-11
Payer: MEDICARE

## 2019-03-11 DIAGNOSIS — Z79.01 CURRENT USE OF LONG TERM ANTICOAGULATION: ICD-10-CM

## 2019-03-11 LAB — INR PPP: 2.2 (ref 2–3)

## 2019-03-11 PROCEDURE — 99211 PR OFFICE/OUTPT VISIT, EST, LEVL I: ICD-10-PCS | Mod: 25,S$GLB,,

## 2019-03-11 PROCEDURE — 85610 PROTHROMBIN TIME: CPT | Mod: QW,S$GLB,,

## 2019-03-11 PROCEDURE — 85610 POCT INR: ICD-10-PCS | Mod: QW,S$GLB,,

## 2019-03-11 PROCEDURE — 99211 OFF/OP EST MAY X REQ PHY/QHP: CPT | Mod: 25,S$GLB,,

## 2019-03-11 NOTE — PROGRESS NOTES
INR good. Patient reports he was unable to fill his Rx and contacted outside office for refills. Somehow he got refills for 3mg tablets and 5mg tablets. He missed his last visit due to missing a few doses and didn't think a check would be good since he missed. He then adjusted his dose with his new tablets to take 6.5mg daily except 8mg Sunday (47mg/week). He knew his prescribed weekly dosage was 47.5mg/week and tried to get it close to that. He was advised that 1 he should have called us for refills when he was having issue with pharmacy and 2 he should never adjust his dose on his own. Fortunately, his decisions were not too bad and his INR is good. He denies any other changes. No signs or symptoms of bleeding. We will keep him on the dose he is taking for now. Recheck INR in 3 weeks

## 2019-04-15 ENCOUNTER — CLINICAL SUPPORT (OUTPATIENT)
Dept: CARDIOLOGY | Facility: HOSPITAL | Age: 64
End: 2019-04-15
Attending: INTERNAL MEDICINE
Payer: MEDICARE

## 2019-04-15 DIAGNOSIS — Z95.810 AICD (AUTOMATIC CARDIOVERTER/DEFIBRILLATOR) PRESENT: ICD-10-CM

## 2019-04-15 PROCEDURE — 93296 REM INTERROG EVL PM/IDS: CPT

## 2019-04-24 ENCOUNTER — ANTI-COAG VISIT (OUTPATIENT)
Dept: CARDIOLOGY | Facility: CLINIC | Age: 64
End: 2019-04-24
Payer: MEDICARE

## 2019-04-24 DIAGNOSIS — Z79.01 CURRENT USE OF LONG TERM ANTICOAGULATION: ICD-10-CM

## 2019-04-24 LAB — INR PPP: 2.1 (ref 2–3)

## 2019-04-24 PROCEDURE — 85610 PROTHROMBIN TIME: CPT | Mod: QW,S$GLB,,

## 2019-04-24 PROCEDURE — 85610 POCT INR: ICD-10-PCS | Mod: QW,S$GLB,,

## 2019-04-24 PROCEDURE — 93793 PR ANTICOAGULANT MGMT FOR PT TAKING WARFARIN: ICD-10-PCS | Mod: S$GLB,,,

## 2019-04-24 PROCEDURE — 93793 ANTICOAG MGMT PT WARFARIN: CPT | Mod: S$GLB,,,

## 2019-04-24 NOTE — PROGRESS NOTES
INR good. Patient denies any new changes. No signs or symptoms of bleeding. Maintain dose and repeat INR next month

## 2019-05-14 DIAGNOSIS — I50.22 CHF (CONGESTIVE HEART FAILURE), NYHA CLASS III, CHRONIC, SYSTOLIC: ICD-10-CM

## 2019-05-14 RX ORDER — SPIRONOLACTONE 25 MG/1
TABLET ORAL
Qty: 90 TABLET | Refills: 0 | Status: SHIPPED | OUTPATIENT
Start: 2019-05-14 | End: 2019-05-28 | Stop reason: SDUPTHER

## 2019-05-28 DIAGNOSIS — I50.22 CHF (CONGESTIVE HEART FAILURE), NYHA CLASS III, CHRONIC, SYSTOLIC: ICD-10-CM

## 2019-05-28 DIAGNOSIS — Z79.01 CURRENT USE OF LONG TERM ANTICOAGULATION: ICD-10-CM

## 2019-05-28 RX ORDER — SPIRONOLACTONE 25 MG/1
TABLET ORAL
Qty: 90 TABLET | Refills: 0 | Status: SHIPPED | OUTPATIENT
Start: 2019-05-28 | End: 2019-11-24 | Stop reason: SDUPTHER

## 2019-05-28 RX ORDER — WARFARIN 3 MG/1
TABLET ORAL
Qty: 90 TABLET | Refills: 5 | Status: SHIPPED | OUTPATIENT
Start: 2019-05-28 | End: 2021-04-08 | Stop reason: DRUGHIGH

## 2019-05-29 ENCOUNTER — ANTI-COAG VISIT (OUTPATIENT)
Dept: CARDIOLOGY | Facility: CLINIC | Age: 64
End: 2019-05-29
Payer: MEDICARE

## 2019-05-29 DIAGNOSIS — Z79.01 CURRENT USE OF LONG TERM ANTICOAGULATION: ICD-10-CM

## 2019-05-29 LAB — INR PPP: 2.4 (ref 2–3)

## 2019-05-29 PROCEDURE — 85610 POCT INR: ICD-10-PCS | Mod: QW,S$GLB,,

## 2019-05-29 PROCEDURE — 93793 PR ANTICOAGULANT MGMT FOR PT TAKING WARFARIN: ICD-10-PCS | Mod: S$GLB,,,

## 2019-05-29 PROCEDURE — 85610 PROTHROMBIN TIME: CPT | Mod: QW,S$GLB,,

## 2019-05-29 PROCEDURE — 93793 ANTICOAG MGMT PT WARFARIN: CPT | Mod: S$GLB,,,

## 2019-05-29 NOTE — PROGRESS NOTES
INR good. No new changes. No signs or symptoms of bleeding. Maintain dose and Recheck INR in 6 weeks

## 2019-07-11 ENCOUNTER — ANTI-COAG VISIT (OUTPATIENT)
Dept: CARDIOLOGY | Facility: CLINIC | Age: 64
End: 2019-07-11
Payer: MEDICARE

## 2019-07-11 DIAGNOSIS — Z79.01 CURRENT USE OF LONG TERM ANTICOAGULATION: ICD-10-CM

## 2019-07-11 LAB — INR PPP: 2.5 (ref 2–3)

## 2019-07-11 PROCEDURE — 93793 ANTICOAG MGMT PT WARFARIN: CPT | Mod: S$GLB,,,

## 2019-07-11 PROCEDURE — 93793 PR ANTICOAGULANT MGMT FOR PT TAKING WARFARIN: ICD-10-PCS | Mod: S$GLB,,,

## 2019-07-11 PROCEDURE — 85610 POCT INR: ICD-10-PCS | Mod: QW,S$GLB,,

## 2019-07-11 PROCEDURE — 85610 PROTHROMBIN TIME: CPT | Mod: QW,S$GLB,,

## 2019-07-15 ENCOUNTER — CLINICAL SUPPORT (OUTPATIENT)
Dept: CARDIOLOGY | Facility: HOSPITAL | Age: 64
End: 2019-07-15
Attending: INTERNAL MEDICINE
Payer: MEDICARE

## 2019-07-15 DIAGNOSIS — Z95.810 AICD (AUTOMATIC CARDIOVERTER/DEFIBRILLATOR) PRESENT: ICD-10-CM

## 2019-07-15 PROCEDURE — 93296 REM INTERROG EVL PM/IDS: CPT

## 2019-07-27 DIAGNOSIS — Z79.01 CURRENT USE OF LONG TERM ANTICOAGULATION: ICD-10-CM

## 2019-07-27 RX ORDER — CANDESARTAN 32 MG/1
TABLET ORAL
Qty: 90 TABLET | Refills: 0 | Status: SHIPPED | OUTPATIENT
Start: 2019-07-27 | End: 2019-10-25 | Stop reason: SDUPTHER

## 2019-07-29 RX ORDER — WARFARIN 3 MG/1
TABLET ORAL
Qty: 30 TABLET | Refills: 0 | OUTPATIENT
Start: 2019-07-29

## 2019-08-26 RX ORDER — CARVEDILOL 12.5 MG/1
TABLET ORAL
Qty: 60 TABLET | Refills: 0 | Status: SHIPPED | OUTPATIENT
Start: 2019-08-26 | End: 2019-09-25 | Stop reason: SDUPTHER

## 2019-09-11 ENCOUNTER — ANTI-COAG VISIT (OUTPATIENT)
Dept: CARDIOLOGY | Facility: CLINIC | Age: 64
End: 2019-09-11
Payer: MEDICARE

## 2019-09-11 DIAGNOSIS — Z79.01 CURRENT USE OF LONG TERM ANTICOAGULATION: ICD-10-CM

## 2019-09-11 LAB — INR PPP: 2.2 (ref 2–3)

## 2019-09-11 PROCEDURE — 85610 POCT INR: ICD-10-PCS | Mod: QW,S$GLB,,

## 2019-09-11 PROCEDURE — 93793 ANTICOAG MGMT PT WARFARIN: CPT | Mod: S$GLB,,,

## 2019-09-11 PROCEDURE — 93793 PR ANTICOAGULANT MGMT FOR PT TAKING WARFARIN: ICD-10-PCS | Mod: S$GLB,,,

## 2019-09-11 PROCEDURE — 85610 PROTHROMBIN TIME: CPT | Mod: QW,S$GLB,,

## 2019-09-12 ENCOUNTER — TELEPHONE (OUTPATIENT)
Dept: TRANSPLANT | Facility: CLINIC | Age: 64
End: 2019-09-12

## 2019-09-12 DIAGNOSIS — I50.22 CHRONIC SYSTOLIC HEART FAILURE: ICD-10-CM

## 2019-09-12 DIAGNOSIS — I50.22 CHRONIC SYSTOLIC HEART FAILURE: Primary | ICD-10-CM

## 2019-09-13 RX ORDER — FUROSEMIDE 80 MG/1
40 TABLET ORAL DAILY
Qty: 90 TABLET | Refills: 3 | Status: SHIPPED | OUTPATIENT
Start: 2019-09-13 | End: 2020-11-05

## 2019-09-25 RX ORDER — CARVEDILOL 12.5 MG/1
TABLET ORAL
Qty: 60 TABLET | Refills: 0 | Status: SHIPPED | OUTPATIENT
Start: 2019-09-25 | End: 2019-10-25 | Stop reason: SDUPTHER

## 2019-09-30 ENCOUNTER — OFFICE VISIT (OUTPATIENT)
Dept: TRANSPLANT | Facility: CLINIC | Age: 64
End: 2019-09-30
Payer: MEDICARE

## 2019-09-30 ENCOUNTER — LAB VISIT (OUTPATIENT)
Dept: LAB | Facility: HOSPITAL | Age: 64
End: 2019-09-30
Attending: INTERNAL MEDICINE
Payer: MEDICARE

## 2019-09-30 VITALS
HEART RATE: 70 BPM | BODY MASS INDEX: 33.13 KG/M2 | WEIGHT: 250 LBS | SYSTOLIC BLOOD PRESSURE: 120 MMHG | HEIGHT: 73 IN | DIASTOLIC BLOOD PRESSURE: 63 MMHG

## 2019-09-30 DIAGNOSIS — Z79.01 CURRENT USE OF LONG TERM ANTICOAGULATION: ICD-10-CM

## 2019-09-30 DIAGNOSIS — I50.22 CHRONIC SYSTOLIC HEART FAILURE: ICD-10-CM

## 2019-09-30 DIAGNOSIS — Z95.810 BIVENTRICULAR IMPLANTABLE CARDIOVERTER-DEFIBRILLATOR (ICD) IN SITU: ICD-10-CM

## 2019-09-30 DIAGNOSIS — E78.5 DYSLIPIDEMIA: ICD-10-CM

## 2019-09-30 DIAGNOSIS — I10 ESSENTIAL HYPERTENSION: Primary | Chronic | ICD-10-CM

## 2019-09-30 DIAGNOSIS — I50.20 SYSTOLIC CONGESTIVE HEART FAILURE, NYHA CLASS 3, UNSPECIFIED CONGESTIVE HEART FAILURE CHRONICITY: ICD-10-CM

## 2019-09-30 DIAGNOSIS — I48.0 PAROXYSMAL ATRIAL FIBRILLATION: ICD-10-CM

## 2019-09-30 DIAGNOSIS — I42.0 DILATED CARDIOMYOPATHY: Chronic | ICD-10-CM

## 2019-09-30 DIAGNOSIS — Z79.899 ON AMIODARONE THERAPY: ICD-10-CM

## 2019-09-30 DIAGNOSIS — I44.7 LBBB (LEFT BUNDLE BRANCH BLOCK): ICD-10-CM

## 2019-09-30 LAB
ALBUMIN SERPL BCP-MCNC: 4.1 G/DL (ref 3.5–5.2)
ALP SERPL-CCNC: 62 U/L (ref 55–135)
ALT SERPL W/O P-5'-P-CCNC: 14 U/L (ref 10–44)
ANION GAP SERPL CALC-SCNC: 7 MMOL/L (ref 8–16)
AST SERPL-CCNC: 14 U/L (ref 10–40)
BILIRUB SERPL-MCNC: 0.6 MG/DL (ref 0.1–1)
BNP SERPL-MCNC: 95 PG/ML (ref 0–99)
BUN SERPL-MCNC: 26 MG/DL (ref 8–23)
CALCIUM SERPL-MCNC: 9.1 MG/DL (ref 8.7–10.5)
CHLORIDE SERPL-SCNC: 104 MMOL/L (ref 95–110)
CO2 SERPL-SCNC: 27 MMOL/L (ref 23–29)
CREAT SERPL-MCNC: 1.5 MG/DL (ref 0.5–1.4)
EST. GFR  (AFRICAN AMERICAN): 57.3 ML/MIN/1.73 M^2
EST. GFR  (NON AFRICAN AMERICAN): 49.5 ML/MIN/1.73 M^2
GLUCOSE SERPL-MCNC: 125 MG/DL (ref 70–110)
MAGNESIUM SERPL-MCNC: 2 MG/DL (ref 1.6–2.6)
POTASSIUM SERPL-SCNC: 5.1 MMOL/L (ref 3.5–5.1)
PROT SERPL-MCNC: 7.4 G/DL (ref 6–8.4)
SODIUM SERPL-SCNC: 138 MMOL/L (ref 136–145)

## 2019-09-30 PROCEDURE — 36415 COLL VENOUS BLD VENIPUNCTURE: CPT

## 2019-09-30 PROCEDURE — 3078F PR MOST RECENT DIASTOLIC BLOOD PRESSURE < 80 MM HG: ICD-10-PCS | Mod: CPTII,S$GLB,, | Performed by: INTERNAL MEDICINE

## 2019-09-30 PROCEDURE — 83735 ASSAY OF MAGNESIUM: CPT

## 2019-09-30 PROCEDURE — 99214 OFFICE O/P EST MOD 30 MIN: CPT | Mod: S$GLB,,, | Performed by: INTERNAL MEDICINE

## 2019-09-30 PROCEDURE — 99999 PR PBB SHADOW E&M-EST. PATIENT-LVL III: CPT | Mod: PBBFAC,,, | Performed by: INTERNAL MEDICINE

## 2019-09-30 PROCEDURE — 3074F SYST BP LT 130 MM HG: CPT | Mod: CPTII,S$GLB,, | Performed by: INTERNAL MEDICINE

## 2019-09-30 PROCEDURE — 3008F BODY MASS INDEX DOCD: CPT | Mod: CPTII,S$GLB,, | Performed by: INTERNAL MEDICINE

## 2019-09-30 PROCEDURE — 99214 PR OFFICE/OUTPT VISIT, EST, LEVL IV, 30-39 MIN: ICD-10-PCS | Mod: S$GLB,,, | Performed by: INTERNAL MEDICINE

## 2019-09-30 PROCEDURE — 80053 COMPREHEN METABOLIC PANEL: CPT

## 2019-09-30 PROCEDURE — 83880 ASSAY OF NATRIURETIC PEPTIDE: CPT

## 2019-09-30 PROCEDURE — 3008F PR BODY MASS INDEX (BMI) DOCUMENTED: ICD-10-PCS | Mod: CPTII,S$GLB,, | Performed by: INTERNAL MEDICINE

## 2019-09-30 PROCEDURE — 99999 PR PBB SHADOW E&M-EST. PATIENT-LVL III: ICD-10-PCS | Mod: PBBFAC,,, | Performed by: INTERNAL MEDICINE

## 2019-09-30 PROCEDURE — 3074F PR MOST RECENT SYSTOLIC BLOOD PRESSURE < 130 MM HG: ICD-10-PCS | Mod: CPTII,S$GLB,, | Performed by: INTERNAL MEDICINE

## 2019-09-30 PROCEDURE — 3078F DIAST BP <80 MM HG: CPT | Mod: CPTII,S$GLB,, | Performed by: INTERNAL MEDICINE

## 2019-09-30 NOTE — PROGRESS NOTES
Subjective:    Patient ID:  Shae Ramesh III is a 63 y.o. male who presents for follow-up of Congestive Heart Failure      Congestive Heart Failure   Pertinent negatives include no nausea or vomiting.       Mr. 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 Doing well no major symptoms. COntinues to do well    · Moderate left atrial enlargement.  · Severe left ventricular enlargement.  · Eccentric left ventricular hypertrophy.  · Severely decreased left ventricular systolic function. The estimated ejection fraction is 25%  · Global hypokinetic wall motion.  · Grade I (mild) left ventricular diastolic dysfunction consistent with impaired relaxation.  · Normal right ventricular systolic function.  · Moderate-to-severe mitral regurgitation eccentric jet directed posteriorly.  · Mild tricuspid regurgitation.  · Mild right atrial enlargement.  · Normal central venous pressure (3 mm Hg).  · The estimated PA systolic pressure is 21.32 mm Hg  · No pericardial effusion.    Review of Systems   Constitution: Negative for decreased appetite, malaise/fatigue, night sweats, weight gain and weight loss.   Cardiovascular: Negative for claudication, cyanosis, dyspnea on exertion, irregular heartbeat, leg swelling, near-syncope, orthopnea and palpitations.   Respiratory: Negative for hemoptysis, shortness of breath, sleep disturbances due to breathing, snoring, sputum production and wheezing.    Skin: Positive for color change.   Gastrointestinal: Negative for diarrhea, nausea and vomiting.            Physical Exam   Constitutional: He is oriented to person, place, and time. He appears well-developed and well-nourished.   HENT:   Head: Normocephalic and atraumatic.   Eyes: Pupils are equal, round, and reactive to light.  Conjunctivae and EOM are normal.   Neck: Normal range of motion. Neck supple. No JVD present.   Cardiovascular: Normal rate and regular rhythm. Exam reveals no gallop and no friction rub.   Murmur heard.   Holosystolic murmur is present with a grade of 2/6.  Pulmonary/Chest: Breath sounds normal. No respiratory distress. He has no wheezes. He has no rales. He exhibits no tenderness.   Abdominal: Soft. Bowel sounds are normal. He exhibits no distension and no mass. There is no hepatosplenomegaly, splenomegaly or hepatomegaly. There is no tenderness. There is no rebound, no guarding and no CVA tenderness.   Musculoskeletal: Normal range of motion. He exhibits no tenderness.   Neurological: He is alert and oriented to person, place, and time. He has normal reflexes. No cranial nerve deficit. He exhibits normal muscle tone. Coordination normal.   Skin: Skin is warm and dry.   Bilateral lower extremity erythema to the anterior shin with prominent varicose veins.   Psychiatric: He has a normal mood and affect.     Assessment:       1. Essential hypertension    2. Dilated cardiomyopathy    3. Dyslipidemia    4. Chronic systolic heart failure    5. Systolic congestive heart failure, NYHA class 3, unspecified congestive heart failure chronicity    6. Biventricular implantable cardioverter-defibrillator (ICD) in situ    7. Paroxysmal atrial fibrillation    8. LBBB (left bundle branch block)    9. On amiodarone therapy    10. Current use of long term anticoagulation         Plan:     HFrEF doing well FC II NYHA Still MR despite medical therapy but he is asymptomatic. May require mitral clip??    RTC 6 months with CPX

## 2019-09-30 NOTE — LETTER
September 30, 2019        Gabriel Howard  [address]  [address]  [place] [address]  Phone: [phone]  Fax: [phone]     Ming Pate  [address] [place] [address] [address]  [place] [address]  Phone: [phone]  Fax: [phone]             [facility]  [address]  [facility] [address]  Phone: [phone]   Patient: Shae Ramesh III   MR Number: [id number]   YOB: 1955   Date of Visit: 9/30/2019       Dear Dr. iMng Pate, Gabriel Howard    Thank you for referring Shae Ramesh to me for evaluation. Attached you will find relevant portions of my assessment and plan of care.    If you have questions, please do not hesitate to call me. I look forward to following Shae Ramesh along with you.    Sincerely,    James Vieyra MD    Enclosure    If you would like to receive this communication electronically, please contact [email or web address] or [phone] to request [employer] access.    [employer] is a tool which provides read-only access to select patient information with whom you have a relationship. Its easy to use and provides real time access to review your patients record including encounter summaries, notes, results, and demographic information.    If you feel you have received this communication in error or would no longer like to receive these types of communications, please e-mail [email or web address]

## 2019-10-25 RX ORDER — CARVEDILOL 12.5 MG/1
TABLET ORAL
Qty: 60 TABLET | Refills: 0 | Status: SHIPPED | OUTPATIENT
Start: 2019-10-25 | End: 2019-12-23 | Stop reason: SDUPTHER

## 2019-10-25 RX ORDER — CANDESARTAN 32 MG/1
TABLET ORAL
Qty: 90 TABLET | Refills: 0 | Status: SHIPPED | OUTPATIENT
Start: 2019-10-25 | End: 2020-01-23

## 2019-10-31 ENCOUNTER — ANTI-COAG VISIT (OUTPATIENT)
Dept: CARDIOLOGY | Facility: CLINIC | Age: 64
End: 2019-10-31
Payer: MEDICARE

## 2019-10-31 DIAGNOSIS — I48.0 PAROXYSMAL ATRIAL FIBRILLATION: ICD-10-CM

## 2019-10-31 DIAGNOSIS — Z79.01 CURRENT USE OF LONG TERM ANTICOAGULATION: Primary | ICD-10-CM

## 2019-10-31 LAB — INR PPP: 2.1 (ref 2–3)

## 2019-10-31 PROCEDURE — 85610 POCT INR: ICD-10-PCS | Mod: QW,S$GLB,,

## 2019-10-31 PROCEDURE — 85610 PROTHROMBIN TIME: CPT | Mod: QW,S$GLB,,

## 2019-10-31 PROCEDURE — 93793 PR ANTICOAGULANT MGMT FOR PT TAKING WARFARIN: ICD-10-PCS | Mod: S$GLB,,,

## 2019-10-31 PROCEDURE — 93793 ANTICOAG MGMT PT WARFARIN: CPT | Mod: S$GLB,,,

## 2019-11-24 DIAGNOSIS — I50.22 CHF (CONGESTIVE HEART FAILURE), NYHA CLASS III, CHRONIC, SYSTOLIC: ICD-10-CM

## 2019-11-24 RX ORDER — SPIRONOLACTONE 25 MG/1
TABLET ORAL
Qty: 90 TABLET | Refills: 0 | Status: SHIPPED | OUTPATIENT
Start: 2019-11-24 | End: 2019-12-20 | Stop reason: SDUPTHER

## 2019-12-19 ENCOUNTER — ANTI-COAG VISIT (OUTPATIENT)
Dept: CARDIOLOGY | Facility: CLINIC | Age: 64
End: 2019-12-19
Payer: MEDICARE

## 2019-12-19 DIAGNOSIS — Z79.01 CURRENT USE OF LONG TERM ANTICOAGULATION: Primary | ICD-10-CM

## 2019-12-19 DIAGNOSIS — I48.0 PAROXYSMAL ATRIAL FIBRILLATION: ICD-10-CM

## 2019-12-19 LAB — INR PPP: 1.9 (ref 2–3)

## 2019-12-19 PROCEDURE — 85610 POCT INR: ICD-10-PCS | Mod: QW,S$GLB,,

## 2019-12-19 PROCEDURE — 85610 PROTHROMBIN TIME: CPT | Mod: QW,S$GLB,,

## 2019-12-19 PROCEDURE — 93793 ANTICOAG MGMT PT WARFARIN: CPT | Mod: S$GLB,,,

## 2019-12-19 PROCEDURE — 93793 PR ANTICOAGULANT MGMT FOR PT TAKING WARFARIN: ICD-10-PCS | Mod: S$GLB,,,

## 2019-12-19 NOTE — PROGRESS NOTES
INR a tad low. Patient missed a dose 3 days ago which is unusual for him. No other changes. Dose is normally very stable. We will bolus slightly today then resume maintenance dose

## 2019-12-20 DIAGNOSIS — I50.22 CHF (CONGESTIVE HEART FAILURE), NYHA CLASS III, CHRONIC, SYSTOLIC: ICD-10-CM

## 2019-12-20 RX ORDER — SPIRONOLACTONE 25 MG/1
25 TABLET ORAL DAILY
Qty: 90 TABLET | Refills: 0 | Status: SHIPPED | OUTPATIENT
Start: 2019-12-20 | End: 2020-03-19 | Stop reason: SDUPTHER

## 2019-12-20 NOTE — TELEPHONE ENCOUNTER
----- Message from Elisabeth Mcdonnell LPN sent at 12/20/2019  9:39 AM CST -----  Contact: Patient      ----- Message -----  From: Yisel Ruiz  Sent: 12/20/2019   8:29 AM CST  To: [facility] Heart Failure Clinical    The Pt is calling to get a refill on his spironolactone (ALDACTONE) 25 MG tablet and send it to [id number], [place] - [address] AT [facility] [address] [phone] (Phone) [phone] (Fax). Thanks, Yisel

## 2019-12-23 NOTE — TELEPHONE ENCOUNTER
----- Message from Marissa Buck MA sent at 12/23/2019  1:51 PM CST -----  Contact: self  Pt is calling to get a refill  for Coreg 12.5 mg that needs an auth to [facility] pills.  pt. Last visit 9/30/19. Please call [phone].

## 2019-12-24 RX ORDER — CARVEDILOL 12.5 MG/1
12.5 TABLET ORAL 2 TIMES DAILY
Qty: 60 TABLET | Refills: 0 | Status: SHIPPED | OUTPATIENT
Start: 2019-12-24 | End: 2020-01-23

## 2020-01-23 RX ORDER — CARVEDILOL 12.5 MG/1
TABLET ORAL
Qty: 180 TABLET | Refills: 9 | Status: SHIPPED | OUTPATIENT
Start: 2020-01-23 | End: 2021-03-30 | Stop reason: SDUPTHER

## 2020-01-23 RX ORDER — CANDESARTAN 32 MG/1
TABLET ORAL
Qty: 90 TABLET | Refills: 0 | Status: SHIPPED | OUTPATIENT
Start: 2020-01-23 | End: 2020-04-22

## 2020-01-23 RX ORDER — CARVEDILOL 12.5 MG/1
TABLET ORAL
Qty: 60 TABLET | Refills: 0 | Status: SHIPPED | OUTPATIENT
Start: 2020-01-23 | End: 2020-01-23

## 2020-02-18 DIAGNOSIS — Z79.01 CURRENT USE OF LONG TERM ANTICOAGULATION: ICD-10-CM

## 2020-02-18 RX ORDER — WARFARIN SODIUM 5 MG/1
TABLET ORAL
Qty: 120 TABLET | Refills: 3 | Status: SHIPPED | OUTPATIENT
Start: 2020-02-18 | End: 2021-04-08 | Stop reason: SDUPTHER

## 2020-02-26 NOTE — PROGRESS NOTES
Pt states his wife had a stroke and he has not been keeping up with the appt. He states I can schedule him an appt or send something out to call and get scheduled. Scheduled the patient for 3/9/2020. He states he will call if he need to to cancel the appt.

## 2020-03-09 ENCOUNTER — ANTI-COAG VISIT (OUTPATIENT)
Dept: CARDIOLOGY | Facility: CLINIC | Age: 65
End: 2020-03-09
Payer: MEDICARE

## 2020-03-09 DIAGNOSIS — I48.0 PAROXYSMAL ATRIAL FIBRILLATION: ICD-10-CM

## 2020-03-09 DIAGNOSIS — Z79.01 CURRENT USE OF LONG TERM ANTICOAGULATION: Primary | ICD-10-CM

## 2020-03-09 LAB — INR PPP: 2.2 (ref 2–3)

## 2020-03-09 PROCEDURE — 85610 POCT INR: ICD-10-PCS | Mod: QW,S$GLB,,

## 2020-03-09 PROCEDURE — 93793 PR ANTICOAGULANT MGMT FOR PT TAKING WARFARIN: ICD-10-PCS | Mod: S$GLB,,,

## 2020-03-09 PROCEDURE — 93793 ANTICOAG MGMT PT WARFARIN: CPT | Mod: S$GLB,,,

## 2020-03-09 PROCEDURE — 85610 PROTHROMBIN TIME: CPT | Mod: QW,S$GLB,,

## 2020-03-19 ENCOUNTER — TELEPHONE (OUTPATIENT)
Dept: TRANSPLANT | Facility: CLINIC | Age: 65
End: 2020-03-19

## 2020-03-19 DIAGNOSIS — I50.22 CHF (CONGESTIVE HEART FAILURE), NYHA CLASS III, CHRONIC, SYSTOLIC: ICD-10-CM

## 2020-03-19 RX ORDER — SPIRONOLACTONE 25 MG/1
25 TABLET ORAL DAILY
Qty: 90 TABLET | Refills: 0 | Status: SHIPPED | OUTPATIENT
Start: 2020-03-19 | End: 2020-06-23 | Stop reason: SDUPTHER

## 2020-03-19 NOTE — TELEPHONE ENCOUNTER
----- Message from Tabitha Brooks MA sent at 3/19/2020  8:20 AM CDT -----  Contact: patient call   The patient need refill on his medication Spironolactone 25 mg 90/ 3 refill please sent it to [facility]  [phone]. Thank you.

## 2020-03-19 NOTE — TELEPHONE ENCOUNTER
----- Message from Tabitha Brooks MA sent at 3/19/2020  8:20 AM CDT -----  Contact: patient call   The patient need refill on his medication Spironolactone 25 mg 90/ 3 refill please sent it to [facility]  [phone]. Thank you.

## 2020-04-03 NOTE — PROGRESS NOTES
Called Patient and left message to call coumadin clinic, also called Dana's phone # and left message that the coumadin clinic is closed and his 4/30 appointment was being rescheduled for the same day and time at the [facility] department and to call if any questions or is unable to keep the appointment

## 2020-04-15 ENCOUNTER — DOCUMENTATION ONLY (OUTPATIENT)
Dept: ELECTROPHYSIOLOGY | Facility: CLINIC | Age: 65
End: 2020-04-15

## 2020-04-15 NOTE — PROGRESS NOTES
Alert received in [facility] home monitoring that CRTD has reached RRT on 4/14/20.  Spoke with patient, and he elects to follow Dr. Niesha Rodriguez in [place].  In box message sent to Dr. Niesha Rodriguez and his device nurse Dayanna to schedule.

## 2020-04-22 RX ORDER — CANDESARTAN 32 MG/1
TABLET ORAL
Qty: 90 TABLET | Refills: 0 | Status: SHIPPED | OUTPATIENT
Start: 2020-04-22 | End: 2020-07-21

## 2020-04-24 ENCOUNTER — TELEPHONE (OUTPATIENT)
Dept: CARDIOLOGY | Facility: HOSPITAL | Age: 65
End: 2020-04-24

## 2020-04-24 NOTE — TELEPHONE ENCOUNTER
Spoke with patient, advised that Dr. Martinez had been contacted regarding low battery on his IAN device.  We are awaiting recommendations from provider regarding timing for generator change.  Pt has underlying sinus rhythm, biventricular paced 94% of time.  Once we have more information will call patient.

## 2020-04-24 NOTE — TELEPHONE ENCOUNTER
----- Message from Sheila Wagner sent at 4/24/2020 12:11 PM CDT -----  Contact: Patient  Patient prefers to stay with Dr. Cheng. Device is at RRT since 4/14/2020. Please contact him for appt with Dr. Cheng. See note below.     ThanksMeredith  ----- Message -----  From: Yisel Ruiz  Sent: 4/24/2020  11:01 AM CDT  To: [facility] Arrhythmia Device Staff    The Pt is calling regarding his device. He states that no one is calling him back. Please call him back  [phone]. Thanks, Yisel

## 2020-04-27 ENCOUNTER — TELEPHONE (OUTPATIENT)
Dept: CARDIOLOGY | Facility: HOSPITAL | Age: 65
End: 2020-04-27

## 2020-04-27 NOTE — TELEPHONE ENCOUNTER
----- Message from Lex Cheng MD sent at 4/24/2020  3:10 PM CDT -----  His DAVID alert is quite recent.(4/14/2020)  He can wait till after elective stuff will be back on line - he likely has up to 6 months of battery life left  I have asked jose to send me the last transmission to confirm  Once I see this we will set another transmission - prob end of May to mid June      Tx     ----- Message -----  From: Dayanna Noble RN  Sent: 4/24/2020   1:06 PM CDT  To: Lex Cheng MD    Good afternoon Dr. Martinez,    Mr. Ramesh contacted office today wanting to know what the plan was regarding his generator change.  Last clinic visit his underlying was sinus.  He has a PARADYM RF CRT-D which was implanted in 2013.  Please advise.        ----- Message -----  From: Jose Webb RN  Sent: 4/15/2020   3:39 PM CDT  To: Dayanna Noble RN, MD Lex Mak,    I have called Mr. Ramesh and he is electing to follow you in [employer], hence the DAVID message that follows.  He was last in clinic with Dayanna 10/4/2018.  If you need the eduardo transmission, I can email it to you.  Hope all is well!    The patients' implantable cardiac device has reached ELECTIVE REPLACEMENT.     Current Device  and Model: Eduardo Paradym RF CRTD    Date of DAVID, if known:  4/14/2020    Pacemaker Dependent: unknown.  Was SR at last in clinic check 10/4/18    Anticoagulation Status:  Coumadin    Last EF: 25% 11/19/2018    Model of Leads:  RA SJM Tendril 1888TC, implanted 6/13/13  RV Eduardo Vigila 1CR65, imp 6/13/13  LV SJM Quickflex 1258 T, imp 6/13/13    MRI compatible:  No

## 2020-04-27 NOTE — TELEPHONE ENCOUNTER
Spoke with pt, relayed recommendations from Dr. Martinez.  Plan for remote transmission through [facility] arrhythmia late May/mid June.  All questions answered.      Lex Cheng M.D.   Sat 4/25/2020 11:36 AM   ?   No problem   Thank you   He has enough time to plan for elective replacement post CoViD restrictions.

## 2020-04-30 ENCOUNTER — ANTI-COAG VISIT (OUTPATIENT)
Dept: CARDIOLOGY | Facility: CLINIC | Age: 65
End: 2020-04-30
Payer: MEDICARE

## 2020-04-30 ENCOUNTER — LAB VISIT (OUTPATIENT)
Dept: LAB | Facility: HOSPITAL | Age: 65
End: 2020-04-30
Attending: FAMILY MEDICINE
Payer: MEDICARE

## 2020-04-30 DIAGNOSIS — Z79.01 CURRENT USE OF LONG TERM ANTICOAGULATION: ICD-10-CM

## 2020-04-30 DIAGNOSIS — I48.0 PAROXYSMAL ATRIAL FIBRILLATION: ICD-10-CM

## 2020-04-30 LAB
INR PPP: 1.8 (ref 0.8–1.2)
PROTHROMBIN TIME: 20.1 SEC (ref 9–12.5)

## 2020-04-30 PROCEDURE — 93793 ANTICOAG MGMT PT WARFARIN: CPT | Mod: S$GLB,,, | Performed by: PHARMACIST

## 2020-04-30 PROCEDURE — 36415 COLL VENOUS BLD VENIPUNCTURE: CPT | Mod: PO

## 2020-04-30 PROCEDURE — 85610 PROTHROMBIN TIME: CPT

## 2020-04-30 PROCEDURE — 93793 PR ANTICOAGULANT MGMT FOR PT TAKING WARFARIN: ICD-10-PCS | Mod: S$GLB,,, | Performed by: PHARMACIST

## 2020-04-30 NOTE — PROGRESS NOTES
Confirmed taking correct dose of coumadin.   Reports eating a serving Cabbage last week  NO other new changes      no

## 2020-05-27 ENCOUNTER — DOCUMENTATION ONLY (OUTPATIENT)
Dept: CARDIOLOGY | Facility: HOSPITAL | Age: 65
End: 2020-05-27

## 2020-05-27 NOTE — PROGRESS NOTES
REMOTE Interrogation Report ICD  Presenting egram demonstrates AS/Bi-  Device fxn WNL. RRT reached on 5/26/2020  Autocapture algorithms are off.  RA pacing 12%, RV pacing 90%   Atrial arrhythmias: none detected. Anticoagulation status: warfarin  Ventricular arrhythmias: none detected.  Battery Status/Longevity (estimated): at RRT  See MD regarding ICD change out.   Report prepared by Meredith Wagner, technician

## 2020-06-01 NOTE — PROGRESS NOTES
6/01/20 -Called Patient and rescheduled 5/21 missed lab appointment to 6/08/20 (reports has had a lot going on)

## 2020-06-04 ENCOUNTER — TELEPHONE (OUTPATIENT)
Dept: CARDIOLOGY | Facility: CLINIC | Age: 65
End: 2020-06-04

## 2020-06-04 NOTE — TELEPHONE ENCOUNTER
----- Message from Karina Cortés sent at 6/4/2020  2:24 PM CDT -----  Contact: pt  Type:  Patient Returning Call    Who Called:nurse  Who Left Message for Patient:Dayanna  Does the patient know what this is regarding?:appt  Would the patient rather a call back or a response via [employer]? Call back  Best Call Back Number:[phone]  Additional Information: na

## 2020-06-04 NOTE — TELEPHONE ENCOUNTER
----- Message from Shandra Ramsey RN sent at 6/4/2020  4:57 PM CDT -----  Contact: pt    ----- Message -----  From: Karina Cortés  Sent: 6/4/2020   2:24 PM CDT  To: Pros[facility] PEREZ Staff    Type:  Patient Returning Call    Who Called:nurse  Who Left Message for Patient:Dayanna  Does the patient know what this is regarding?:appt  Would the patient rather a call back or a response via [facility]? Call back  Best Call Back Number:[phone]  Additional Information: na

## 2020-06-04 NOTE — TELEPHONE ENCOUNTER
Telephoned patient and advised only tentative plan for scheduling procedures with Dr. Martinez maybe late July or early August  Reassured he would be contacted once definite dates are available.      ----- Message from Lex Cheng MD sent at 6/1/2020  4:21 PM CDT -----  Contact: [phone]  Please get the transmission   Thanks  ----- Message -----  From: Sheila Wagner  Sent: 5/27/2020  10:09 AM CDT  To: Dayanna Noble, RN, Lex Cheng MD    Patient has reached RRT as of yesterday. I have made a note in Epic regarding battery.   [place] is down at this time. If you would like me to fax the transmission, please send me your fax number please.    Thanks,  Meredith Wagner  Arrhythmia Diagnostic Specialist  [phone]

## 2020-06-08 ENCOUNTER — LAB VISIT (OUTPATIENT)
Dept: LAB | Facility: HOSPITAL | Age: 65
End: 2020-06-08
Attending: INTERNAL MEDICINE
Payer: MEDICARE

## 2020-06-08 ENCOUNTER — ANTI-COAG VISIT (OUTPATIENT)
Dept: CARDIOLOGY | Facility: CLINIC | Age: 65
End: 2020-06-08
Payer: MEDICARE

## 2020-06-08 DIAGNOSIS — Z79.01 CURRENT USE OF LONG TERM ANTICOAGULATION: ICD-10-CM

## 2020-06-08 DIAGNOSIS — I48.0 PAROXYSMAL ATRIAL FIBRILLATION: ICD-10-CM

## 2020-06-08 LAB
INR PPP: 2 (ref 0.8–1.2)
PROTHROMBIN TIME: 22.1 SEC (ref 9–12.5)

## 2020-06-08 PROCEDURE — 93793 PR ANTICOAGULANT MGMT FOR PT TAKING WARFARIN: ICD-10-PCS | Mod: S$GLB,,, | Performed by: PHARMACIST

## 2020-06-08 PROCEDURE — 93793 ANTICOAG MGMT PT WARFARIN: CPT | Mod: S$GLB,,, | Performed by: PHARMACIST

## 2020-06-08 PROCEDURE — 36415 COLL VENOUS BLD VENIPUNCTURE: CPT | Mod: PO

## 2020-06-08 PROCEDURE — 85610 PROTHROMBIN TIME: CPT

## 2020-06-10 ENCOUNTER — TELEPHONE (OUTPATIENT)
Dept: CARDIOLOGY | Facility: CLINIC | Age: 65
End: 2020-06-10

## 2020-06-10 DIAGNOSIS — Z45.010 PACEMAKER AT END OF BATTERY LIFE: ICD-10-CM

## 2020-06-10 DIAGNOSIS — Z45.02 BIVENTRICULAR IMPLANTABLE CARDIOVERTER-DEFIBRILLATOR (ICD) AT END OF DEVICE LIFE: Primary | ICD-10-CM

## 2020-06-10 DIAGNOSIS — I50.42 CHRONIC COMBINED SYSTOLIC AND DIASTOLIC CHF, NYHA CLASS 2: ICD-10-CM

## 2020-06-10 NOTE — TELEPHONE ENCOUNTER
----- Message from Lex Cheng MD sent at 6/1/2020  4:21 PM CDT -----  Contact: [phone]  Please get the transmission   Thanks  ----- Message -----  From: Sheila Wagner  Sent: 5/27/2020  10:09 AM CDT  To: Dayanna Noble RN, Lex Cheng MD    Patient has reached RRT as of yesterday. I have made a note in Epic regarding battery.   Transfer is down at this time. If you would like me to fax the transmission, please send me your fax number please.    Thanks,  Meredith Wagner  Arrhythmia Diagnostic Specialist  [phone]

## 2020-06-10 NOTE — TELEPHONE ENCOUNTER
Telephoned patient to discuss scheduling generator changeout on Friday.  Patient stated not a good day needs time to arrange for wife's care s/p CVA and that his Dad just went into [place] and trying to get him settled.  Asked to consider next Wednesday or Friday.  Explained need for lab work maybe 1-2 day hold of Coumadin depending on lab results, needing transportation home..  Will return call to confirm date and time

## 2020-06-16 NOTE — TELEPHONE ENCOUNTER
Returned call to patient and confirmed scheduling ICD generator change on 6/24/20  Lab work scheduled for 6/22/20  Discussed NPO status, medications and arrival time

## 2020-06-22 ENCOUNTER — LAB VISIT (OUTPATIENT)
Dept: FAMILY MEDICINE | Facility: CLINIC | Age: 65
End: 2020-06-22
Payer: MEDICARE

## 2020-06-22 ENCOUNTER — LAB VISIT (OUTPATIENT)
Dept: LAB | Facility: HOSPITAL | Age: 65
End: 2020-06-22
Attending: INTERNAL MEDICINE
Payer: MEDICARE

## 2020-06-22 DIAGNOSIS — Z45.010 PACEMAKER AT END OF BATTERY LIFE: ICD-10-CM

## 2020-06-22 DIAGNOSIS — I50.42 CHRONIC COMBINED SYSTOLIC AND DIASTOLIC CHF, NYHA CLASS 2: ICD-10-CM

## 2020-06-22 DIAGNOSIS — Z45.018 PACEMAKER END OF LIFE: Primary | ICD-10-CM

## 2020-06-22 DIAGNOSIS — Z45.02 BIVENTRICULAR IMPLANTABLE CARDIOVERTER-DEFIBRILLATOR (ICD) AT END OF DEVICE LIFE: ICD-10-CM

## 2020-06-22 LAB
ANION GAP SERPL CALC-SCNC: 7 MMOL/L (ref 8–16)
APTT BLDCRRT: 39.4 SEC (ref 21–32)
BASOPHILS # BLD AUTO: 0.05 K/UL (ref 0–0.2)
BASOPHILS NFR BLD: 0.7 % (ref 0–1.9)
BUN SERPL-MCNC: 26 MG/DL (ref 8–23)
CALCIUM SERPL-MCNC: 9 MG/DL (ref 8.7–10.5)
CHLORIDE SERPL-SCNC: 107 MMOL/L (ref 95–110)
CO2 SERPL-SCNC: 26 MMOL/L (ref 23–29)
CREAT SERPL-MCNC: 1.3 MG/DL (ref 0.5–1.4)
DIFFERENTIAL METHOD: ABNORMAL
EOSINOPHIL # BLD AUTO: 0.3 K/UL (ref 0–0.5)
EOSINOPHIL NFR BLD: 4.2 % (ref 0–8)
ERYTHROCYTE [DISTWIDTH] IN BLOOD BY AUTOMATED COUNT: 13.1 % (ref 11.5–14.5)
EST. GFR  (AFRICAN AMERICAN): >60 ML/MIN/1.73 M^2
EST. GFR  (NON AFRICAN AMERICAN): 58 ML/MIN/1.73 M^2
GLUCOSE SERPL-MCNC: 120 MG/DL (ref 70–110)
HCT VFR BLD AUTO: 36.2 % (ref 40–54)
HGB BLD-MCNC: 12 G/DL (ref 14–18)
IMM GRANULOCYTES # BLD AUTO: 0.02 K/UL (ref 0–0.04)
IMM GRANULOCYTES NFR BLD AUTO: 0.3 % (ref 0–0.5)
INR PPP: 2.3 (ref 0.8–1.2)
LYMPHOCYTES # BLD AUTO: 1.9 K/UL (ref 1–4.8)
LYMPHOCYTES NFR BLD: 26 % (ref 18–48)
MCH RBC QN AUTO: 31.7 PG (ref 27–31)
MCHC RBC AUTO-ENTMCNC: 33.1 G/DL (ref 32–36)
MCV RBC AUTO: 96 FL (ref 82–98)
MONOCYTES # BLD AUTO: 0.6 K/UL (ref 0.3–1)
MONOCYTES NFR BLD: 8.6 % (ref 4–15)
NEUTROPHILS # BLD AUTO: 4.5 K/UL (ref 1.8–7.7)
NEUTROPHILS NFR BLD: 60.2 % (ref 38–73)
NRBC BLD-RTO: 0 /100 WBC
PLATELET # BLD AUTO: 161 K/UL (ref 150–350)
PMV BLD AUTO: 11.4 FL (ref 9.2–12.9)
POTASSIUM SERPL-SCNC: 5.3 MMOL/L (ref 3.5–5.1)
PROTHROMBIN TIME: 25.4 SEC (ref 9–12.5)
RBC # BLD AUTO: 3.78 M/UL (ref 4.6–6.2)
SODIUM SERPL-SCNC: 140 MMOL/L (ref 136–145)
WBC # BLD AUTO: 7.42 K/UL (ref 3.9–12.7)

## 2020-06-22 PROCEDURE — 85730 THROMBOPLASTIN TIME PARTIAL: CPT

## 2020-06-22 PROCEDURE — 85610 PROTHROMBIN TIME: CPT

## 2020-06-22 PROCEDURE — U0003 INFECTIOUS AGENT DETECTION BY NUCLEIC ACID (DNA OR RNA); SEVERE ACUTE RESPIRATORY SYNDROME CORONAVIRUS 2 (SARS-COV-2) (CORONAVIRUS DISEASE [COVID-19]), AMPLIFIED PROBE TECHNIQUE, MAKING USE OF HIGH THROUGHPUT TECHNOLOGIES AS DESCRIBED BY CMS-2020-01-R: HCPCS

## 2020-06-22 PROCEDURE — 85025 COMPLETE CBC W/AUTO DIFF WBC: CPT

## 2020-06-22 PROCEDURE — 80048 BASIC METABOLIC PNL TOTAL CA: CPT

## 2020-06-22 PROCEDURE — 36415 COLL VENOUS BLD VENIPUNCTURE: CPT | Mod: PO

## 2020-06-23 DIAGNOSIS — I50.22 CHF (CONGESTIVE HEART FAILURE), NYHA CLASS III, CHRONIC, SYSTOLIC: ICD-10-CM

## 2020-06-23 DIAGNOSIS — I50.40 CHF (CONGESTIVE HEART FAILURE), NYHA CLASS II, UNSPECIFIED FAILURE CHRONICITY, COMBINED: ICD-10-CM

## 2020-06-23 DIAGNOSIS — Z45.02 IMPLANTABLE CARDIOVERTER-DEFIBRILLATOR (ICD) GENERATOR END OF LIFE: Primary | ICD-10-CM

## 2020-06-23 DIAGNOSIS — I42.0 DCM (DILATED CARDIOMYOPATHY): ICD-10-CM

## 2020-06-23 LAB — SARS-COV-2 RNA RESP QL NAA+PROBE: NOT DETECTED

## 2020-06-23 RX ORDER — SPIRONOLACTONE 25 MG/1
25 TABLET ORAL DAILY
Qty: 90 TABLET | Refills: 3 | Status: SHIPPED | OUTPATIENT
Start: 2020-06-23 | End: 2021-06-21

## 2020-06-23 NOTE — TELEPHONE ENCOUNTER
----- Message from Marissa Buck MA sent at 6/23/2020  9:25 AM CDT -----  Contact: self  Pt is calling to get a refill for Spironolactone 25 mg to [facility] # [phone],x3. Please call. [phone]  pt. Last visit  9/30/19.

## 2020-06-23 NOTE — TELEPHONE ENCOUNTER
----- Message from Lex Cheng MD sent at 6/23/2020  5:52 PM CDT -----  Results reviewed. abnormalities as listed. Please see appended comments,create telephone encounter, call patient and inform him/her of results and action to take then document in encounter and close it.   In general there will be no need to route back to   me unless there is an issue that you need to discuss. Thanks    Hold next dose of aldactone

## 2020-06-23 NOTE — TELEPHONE ENCOUNTER
Received a call from patient regarding location and time to arrive tomorrow  Left voice mail message for return call to discuss further  Stressed no Coumadin tonight.  Arrival time 9:30am

## 2020-06-24 ENCOUNTER — ANESTHESIA EVENT (OUTPATIENT)
Dept: CARDIOLOGY | Facility: HOSPITAL | Age: 65
End: 2020-06-24
Payer: MEDICARE

## 2020-06-24 ENCOUNTER — HOSPITAL ENCOUNTER (OUTPATIENT)
Facility: HOSPITAL | Age: 65
Discharge: HOME | End: 2020-06-24
Attending: INTERNAL MEDICINE | Admitting: INTERNAL MEDICINE
Payer: MEDICARE

## 2020-06-24 ENCOUNTER — ANESTHESIA (OUTPATIENT)
Dept: CARDIOLOGY | Facility: HOSPITAL | Age: 65
End: 2020-06-24
Payer: MEDICARE

## 2020-06-24 VITALS
SYSTOLIC BLOOD PRESSURE: 104 MMHG | DIASTOLIC BLOOD PRESSURE: 68 MMHG | HEART RATE: 60 BPM | TEMPERATURE: 98 F | HEIGHT: 73 IN | WEIGHT: 240 LBS | BODY MASS INDEX: 31.81 KG/M2 | OXYGEN SATURATION: 99 % | RESPIRATION RATE: 16 BRPM

## 2020-06-24 DIAGNOSIS — Z45.02 ICD (IMPLANTABLE CARDIOVERTER-DEFIBRILLATOR) BATTERY DEPLETION: Primary | ICD-10-CM

## 2020-06-24 DIAGNOSIS — I50.40 CHF (CONGESTIVE HEART FAILURE), NYHA CLASS II, UNSPECIFIED FAILURE CHRONICITY, COMBINED: ICD-10-CM

## 2020-06-24 DIAGNOSIS — Z45.02 IMPLANTABLE CARDIOVERTER-DEFIBRILLATOR (ICD) GENERATOR END OF LIFE: ICD-10-CM

## 2020-06-24 DIAGNOSIS — I50.40 COMBINED SYSTOLIC AND DIASTOLIC CONGESTIVE HEART FAILURE, NYHA CLASS 2, UNSPECIFIED CONGESTIVE HEART FAILURE CHRONICITY: ICD-10-CM

## 2020-06-24 DIAGNOSIS — I42.0 DCM (DILATED CARDIOMYOPATHY): ICD-10-CM

## 2020-06-24 LAB
INR PPP: 1.6 (ref 0.8–1.2)
PROTHROMBIN TIME: 16.4 SEC (ref 9–12.5)

## 2020-06-24 PROCEDURE — 33264 RMVL & RPLCMT DFB GEN MLT LD: CPT | Mod: ,,, | Performed by: INTERNAL MEDICINE

## 2020-06-24 PROCEDURE — 93010 ELECTROCARDIOGRAM REPORT: CPT | Mod: ,,, | Performed by: INTERNAL MEDICINE

## 2020-06-24 PROCEDURE — 37000009 HC ANESTHESIA EA ADD 15 MINS: Performed by: INTERNAL MEDICINE

## 2020-06-24 PROCEDURE — 63600175 PHARM REV CODE 636 W HCPCS: Performed by: INTERNAL MEDICINE

## 2020-06-24 PROCEDURE — 33264 RMVL & RPLCMT DFB GEN MLT LD: CPT | Performed by: INTERNAL MEDICINE

## 2020-06-24 PROCEDURE — 93005 ELECTROCARDIOGRAM TRACING: CPT | Mod: 59

## 2020-06-24 PROCEDURE — C1882 AICD, OTHER THAN SING/DUAL: HCPCS | Performed by: INTERNAL MEDICINE

## 2020-06-24 PROCEDURE — 85610 PROTHROMBIN TIME: CPT

## 2020-06-24 PROCEDURE — 99499 NO LOS: ICD-10-PCS | Mod: ,,, | Performed by: INTERNAL MEDICINE

## 2020-06-24 PROCEDURE — 25000003 PHARM REV CODE 250: Performed by: INTERNAL MEDICINE

## 2020-06-24 PROCEDURE — 37000008 HC ANESTHESIA 1ST 15 MINUTES: Performed by: INTERNAL MEDICINE

## 2020-06-24 PROCEDURE — 25000003 PHARM REV CODE 250: Performed by: NURSE ANESTHETIST, CERTIFIED REGISTERED

## 2020-06-24 PROCEDURE — 93010 EKG 12-LEAD: ICD-10-PCS | Mod: ,,, | Performed by: INTERNAL MEDICINE

## 2020-06-24 PROCEDURE — 33264 PR REMV&REPLC CVD GEN MULT LEAD: ICD-10-PCS | Mod: ,,, | Performed by: INTERNAL MEDICINE

## 2020-06-24 PROCEDURE — 99499 UNLISTED E&M SERVICE: CPT | Mod: ,,, | Performed by: INTERNAL MEDICINE

## 2020-06-24 PROCEDURE — 63600175 PHARM REV CODE 636 W HCPCS: Performed by: NURSE ANESTHETIST, CERTIFIED REGISTERED

## 2020-06-24 PROCEDURE — 27201423 OPTIME MED/SURG SUP & DEVICES STERILE SUPPLY: Performed by: INTERNAL MEDICINE

## 2020-06-24 DEVICE — IMPLANTABLE DEVICE: Type: IMPLANTABLE DEVICE | Site: CHEST | Status: FUNCTIONAL

## 2020-06-24 RX ORDER — PHENYLEPHRINE HYDROCHLORIDE 10 MG/ML
INJECTION INTRAVENOUS
Status: DISCONTINUED | OUTPATIENT
Start: 2020-06-24 | End: 2020-06-24

## 2020-06-24 RX ORDER — PROPOFOL 10 MG/ML
INJECTION, EMULSION INTRAVENOUS
Status: DISCONTINUED | OUTPATIENT
Start: 2020-06-24 | End: 2020-06-24

## 2020-06-24 RX ORDER — MIDAZOLAM HYDROCHLORIDE 1 MG/ML
INJECTION, SOLUTION INTRAMUSCULAR; INTRAVENOUS
Status: DISCONTINUED | OUTPATIENT
Start: 2020-06-24 | End: 2020-06-24

## 2020-06-24 RX ORDER — CEFADROXIL 500 MG/1
500 CAPSULE ORAL EVERY 12 HOURS
Qty: 6 CAPSULE | Refills: 0 | Status: SHIPPED | OUTPATIENT
Start: 2020-06-24 | End: 2020-07-09

## 2020-06-24 RX ORDER — ONDANSETRON 2 MG/ML
INJECTION INTRAMUSCULAR; INTRAVENOUS
Status: DISCONTINUED | OUTPATIENT
Start: 2020-06-24 | End: 2020-06-24

## 2020-06-24 RX ORDER — FENTANYL CITRATE 50 UG/ML
INJECTION, SOLUTION INTRAMUSCULAR; INTRAVENOUS
Status: DISCONTINUED | OUTPATIENT
Start: 2020-06-24 | End: 2020-06-24

## 2020-06-24 RX ORDER — LIDOCAINE HYDROCHLORIDE 20 MG/ML
INJECTION, SOLUTION EPIDURAL; INFILTRATION; INTRACAUDAL; PERINEURAL
Status: DISCONTINUED | OUTPATIENT
Start: 2020-06-24 | End: 2020-06-24 | Stop reason: HOSPADM

## 2020-06-24 RX ORDER — KETOROLAC TROMETHAMINE 10 MG/1
10 TABLET, FILM COATED ORAL EVERY 6 HOURS PRN
Qty: 30 TABLET | Refills: 1 | Status: SHIPPED | OUTPATIENT
Start: 2020-06-24 | End: 2022-10-03 | Stop reason: ALTCHOICE

## 2020-06-24 RX ORDER — SODIUM CHLORIDE, SODIUM LACTATE, POTASSIUM CHLORIDE, CALCIUM CHLORIDE 600; 310; 30; 20 MG/100ML; MG/100ML; MG/100ML; MG/100ML
INJECTION, SOLUTION INTRAVENOUS CONTINUOUS PRN
Status: DISCONTINUED | OUTPATIENT
Start: 2020-06-24 | End: 2020-06-24

## 2020-06-24 RX ORDER — CEFAZOLIN SODIUM 2 G/50ML
2 SOLUTION INTRAVENOUS
Status: COMPLETED | OUTPATIENT
Start: 2020-06-24 | End: 2020-06-24

## 2020-06-24 RX ORDER — LIDOCAINE HCL/PF 100 MG/5ML
SYRINGE (ML) INTRAVENOUS
Status: DISCONTINUED | OUTPATIENT
Start: 2020-06-24 | End: 2020-06-24

## 2020-06-24 RX ORDER — PROPOFOL 10 MG/ML
INJECTION, EMULSION INTRAVENOUS CONTINUOUS PRN
Status: DISCONTINUED | OUTPATIENT
Start: 2020-06-24 | End: 2020-06-24

## 2020-06-24 RX ADMIN — MIDAZOLAM HYDROCHLORIDE 2 MG: 1 INJECTION, SOLUTION INTRAMUSCULAR; INTRAVENOUS at 11:06

## 2020-06-24 RX ADMIN — PROPOFOL 30 MG: 10 INJECTION, EMULSION INTRAVENOUS at 11:06

## 2020-06-24 RX ADMIN — FENTANYL CITRATE 25 MCG: 50 INJECTION, SOLUTION INTRAMUSCULAR; INTRAVENOUS at 11:06

## 2020-06-24 RX ADMIN — CEFAZOLIN SODIUM 2 G: 2 SOLUTION INTRAVENOUS at 11:06

## 2020-06-24 RX ADMIN — FENTANYL CITRATE 50 MCG: 50 INJECTION, SOLUTION INTRAMUSCULAR; INTRAVENOUS at 12:06

## 2020-06-24 RX ADMIN — SODIUM CHLORIDE, SODIUM LACTATE, POTASSIUM CHLORIDE, AND CALCIUM CHLORIDE: .6; .31; .03; .02 INJECTION, SOLUTION INTRAVENOUS at 11:06

## 2020-06-24 RX ADMIN — LIDOCAINE HYDROCHLORIDE 50 MG: 20 INJECTION, SOLUTION INTRAVENOUS at 11:06

## 2020-06-24 RX ADMIN — ONDANSETRON 4 MG: 2 INJECTION INTRAMUSCULAR; INTRAVENOUS at 11:06

## 2020-06-24 RX ADMIN — PHENYLEPHRINE HYDROCHLORIDE 100 MCG: 10 INJECTION INTRAVENOUS at 12:06

## 2020-06-24 RX ADMIN — PROPOFOL 25 MCG/KG/MIN: 10 INJECTION, EMULSION INTRAVENOUS at 11:06

## 2020-06-24 NOTE — ANESTHESIA POSTPROCEDURE EVALUATION
Anesthesia Post Evaluation    Patient: Shae Shambra III    Procedure(s) Performed: Procedure(s) (LRB):  REPLACEMENT, PULSE GENERATOR, ICD (Left)    Final Anesthesia Type: MAC    Patient location during evaluation: PACU  Patient participation: Yes- Able to Participate  Level of consciousness: awake and alert  Post-procedure vital signs: reviewed and stable  Pain management: adequate  Airway patency: patent  TESSA mitigation strategies: Intraoperative administration of CPAP, nasopharyngeal airway, or oral appliance during sedation  PONV status at discharge: No PONV  Anesthetic complications: no      Cardiovascular status: hemodynamically stable  Respiratory status: unassisted  Hydration status: euvolemic  Follow-up not needed.          Vitals Value Taken Time   /58 06/24/20 1246   Temp 37 °C (98.6 °F) 06/24/20 1246   Pulse 60 06/24/20 1246   Resp 15 06/24/20 1246   SpO2 96 % 06/24/20 1246         No case tracking events are documented in the log.      Pain/Trevin Score: Rtevin Score: 9 (6/24/2020 12:39 PM)

## 2020-06-24 NOTE — DISCHARGE INSTRUCTIONS
Nozin Instructions  Goal: the goal of Nozin is to reduce the risk of post-procedural infections by bacteria in the nasal cavity. Think of it as hand  for your nose.    How to use:    1. Shake Nozin bottle well    2. Take a cotton swab and apply 4 drops to one tip    3. Insert cotton tip into one nostril, being sure not to go deeper into nose than tip   of the swab.    4. Swab nostril 6 times counterclockwise and 6 times clockwise. Make sure to   swab the inside front pocket of the nostril.    5. Take swab out and apply 2 drops to the same cotton tip. Repeat steps 3 and 4                         in the other nostril.        Do steps 1-5 twice a day for 7 days. Nozin Instructions  Goal: the goal of Nozin is to reduce the risk of post-procedural infections by bacteria in the nasal cavity. Think of it as hand  for your nose.    How to use:    1. Shake Nozin bottle well    2. Take a cotton swab and apply 4 drops to one tip    3. Insert cotton tip into one nostril, being sure not to go deeper into nose than tip   of the swab.    4. Swab nostril 6 times counterclockwise and 6 times clockwise. Make sure to   swab the inside front pocket of the nostril.    5. Take swab out and apply 2 drops to the same cotton tip. Repeat steps 3 and 4                         in the other nostril.        Do steps 1-5 twice a day for 7 days.     ACTIVITY: Avoid heavy lifting or straining for 6 weeks          You may shower after 2 days          No tub bath until incision site is healed                     No reaching movements above the affected shoulder for 7 days                                      WOUND CARE: It is not unusual to have tenderness at the incision site. Remove the dressing as instructed by your physician    DISCOMFORT: For general discomfort at the incision site, ou may take over the counter acetaminophen as directed on the bottle.    SIGNS AND SYMPTOMS TO REPORT: Call your physician for the following:           Fever greater than 101          Pain not relieved by medication          Swelling, drainage, warmth, or redness at incision site          Shortness of breath                     Hiccoughs within the first 48 hours that are persistent                     Increased fatigue with normal activity          Drowsiness that doesn't get better                 Weakness or dizziness that doesn't get better                 Repeated vomiting      WHEN TO SEEK EMERGENCY ASSISTANCE                      AFTER ICD CALL 911 FOR THE FOLLOWING:                     If you still have symptoms after shock                     If you have 3 or more shocks in a row                     If you have symptoms, but don't feel a shock, or if you feel faint or dizzy or                      Pass out, someone should call 911    IF YOU RECEIVED SEDATION TODAY, PLEASE FOLLOW THE  INSTRUCTIONS BELOW:     · For the next 8 hours, you should be watched by a responsible adult. This person should make sure your condition is not getting worse.  · Don't drink any alcohol for the next 24 hours.  · Don't drive, operate dangerous machinery, or make important business or personal decisions during the next 24 hours.  · Your healthcare provider may tell you not to take any medicine by mouth for pain or sleep in the next 4 hours. These medicines may react with the medicines you were given in the hospital. This could cause a much stronger response than usual.

## 2020-06-24 NOTE — DISCHARGE SUMMARY
Patient with CHF and CRT-D  Admitted for ICD replacement due to DAVID  Procedure performed w/o issues    Patient to go home to self care  RTC 1 week for wound check

## 2020-06-24 NOTE — PLAN OF CARE
VSS. Pain free. Left chest with no bleeding or hematoma.Ambulating to bathroom to urinate  without problems. DC instructions reviewed with pt and brother and able to teach back. Pt and brother will  prescriptions from personal pharmacy as instructed.

## 2020-06-24 NOTE — BRIEF OP NOTE
ICD replaced for DAVID -   New device is a [facility] CRT-D  Leads are intact  No Cx    Plan:  Post op care   DC home today  Duricef 500 bid for 6 doses

## 2020-06-24 NOTE — TRANSFER OF CARE
"Anesthesia Transfer of Care Note    Patient: Shae Ramesh III    Procedure(s) Performed: Procedure(s) (LRB):  REPLACEMENT, PULSE GENERATOR, ICD (Left)    Patient location: Cath Lab    Anesthesia Type: MAC    Transport from OR: Transported from OR on room air with adequate spontaneous ventilation    Post pain: adequate analgesia    Post assessment: no apparent anesthetic complications    Post vital signs: stable    Level of consciousness: awake    Complications: none    Transfer of care protocol was followed      Last vitals:   Visit Vitals  BP (!) 105/56   Pulse 65   Temp 36.7 °C (98.1 °F) (Oral)   Resp 16   Ht 6' 1" (1.854 m)   Wt 108.9 kg (240 lb)   SpO2 97%   BMI 31.66 kg/m²     "

## 2020-06-24 NOTE — Clinical Note
The generator was existing, leads disconnected, removed and returned to . It was returned to the  because DAVID/EOL.

## 2020-06-24 NOTE — INTERVAL H&P NOTE
Mr. 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 Doing well no major symptoms. COntinues to do well     · Moderate left atrial enlargement.  · Severe left ventricular enlargement.  · Eccentric left ventricular hypertrophy.  · Severely decreased left ventricular systolic function. The estimated ejection fraction is 25%  · Global hypokinetic wall motion.  · Grade I (mild) left ventricular diastolic dysfunction consistent with impaired relaxation.  · Normal right ventricular systolic function.  · Moderate-to-severe mitral regurgitation eccentric jet directed posteriorly.  · Mild tricuspid regurgitation.  · Mild right atrial enlargement.  · Normal central venous pressure (3 mm Hg).  · The estimated PA systolic pressure is 21.32 mm Hg  No pericardial effusion.  His ICD has  recently been noted to have reached DAVID and he is here for battery replacement.   He has had no recent issues with either CHF or infections etc       The patient has been examined and the chart has been reviewed  There have been no significant changes since the above clinic visit was completed. Patient has not had signs or symptoms of intercurrent infections and there has been no significant change in their cardiovascular functionality.   I have discussed the procedure in detail with the patient. I described its benefits and risks. I reviewed alternative therapies and discussed their potential value. The patient was given ample opportunity to express concerns and ask questions and I provided appropriate responses and  answers to such.The patient understands and agrees to proceed.  Consent form was signed by patient and myself and appropriately witnessed.   We will proceed today as planned.       Anesthesia/Surgery risks, benefits and  alternative options discussed and understood by patient/family.          Active Hospital Problems    Diagnosis  POA    DCM (dilated cardiomyopathy) [I42.0]  Yes     Priority: High      Resolved Hospital Problems   No resolved problems to display.

## 2020-06-26 ENCOUNTER — TELEPHONE (OUTPATIENT)
Dept: CARDIOLOGY | Facility: CLINIC | Age: 65
End: 2020-06-26

## 2020-06-26 ENCOUNTER — TELEPHONE (OUTPATIENT)
Dept: ELECTROPHYSIOLOGY | Facility: CLINIC | Age: 65
End: 2020-06-26

## 2020-06-26 ENCOUNTER — TELEPHONE (OUTPATIENT)
Dept: CARDIOLOGY | Facility: HOSPITAL | Age: 65
End: 2020-06-26

## 2020-06-26 NOTE — TELEPHONE ENCOUNTER
Called patient to see if he was following with Dr. Cheng in [place] s/p generator change done by Dr. Cheng in [place]. No answer, left voicemail.

## 2020-06-26 NOTE — TELEPHONE ENCOUNTER
Dr. Martinez- please advise needs substitute for Toradol not preferred or approved by his Insurance .  Preferred drugs all other NSAIDS but this drug.

## 2020-07-02 ENCOUNTER — CLINICAL SUPPORT (OUTPATIENT)
Dept: CARDIOLOGY | Facility: HOSPITAL | Age: 65
End: 2020-07-02
Attending: INTERNAL MEDICINE
Payer: MEDICARE

## 2020-07-02 DIAGNOSIS — Z95.810 AICD (AUTOMATIC CARDIOVERTER/DEFIBRILLATOR) PRESENT: ICD-10-CM

## 2020-07-02 PROCEDURE — 93284 PRGRMG EVAL IMPLANTABLE DFB: CPT | Mod: 26,,, | Performed by: INTERNAL MEDICINE

## 2020-07-02 PROCEDURE — 93284 CARDIAC DEVICE CHECK - IN CLINIC & HOSPITAL: ICD-10-PCS | Mod: 26,,, | Performed by: INTERNAL MEDICINE

## 2020-07-02 PROCEDURE — 93284 PRGRMG EVAL IMPLANTABLE DFB: CPT

## 2020-07-08 ENCOUNTER — LAB VISIT (OUTPATIENT)
Dept: LAB | Facility: HOSPITAL | Age: 65
End: 2020-07-08
Attending: INTERNAL MEDICINE
Payer: MEDICARE

## 2020-07-08 ENCOUNTER — ANTI-COAG VISIT (OUTPATIENT)
Dept: CARDIOLOGY | Facility: CLINIC | Age: 65
End: 2020-07-08
Payer: MEDICARE

## 2020-07-08 DIAGNOSIS — Z79.01 CURRENT USE OF LONG TERM ANTICOAGULATION: ICD-10-CM

## 2020-07-08 DIAGNOSIS — I50.22 CHRONIC SYSTOLIC HEART FAILURE: Primary | ICD-10-CM

## 2020-07-08 DIAGNOSIS — I48.0 PAROXYSMAL ATRIAL FIBRILLATION: ICD-10-CM

## 2020-07-08 DIAGNOSIS — I50.22 CHRONIC SYSTOLIC HEART FAILURE: ICD-10-CM

## 2020-07-08 LAB
ALBUMIN SERPL BCP-MCNC: 4.1 G/DL (ref 3.5–5.2)
ALP SERPL-CCNC: 59 U/L (ref 55–135)
ALT SERPL W/O P-5'-P-CCNC: 11 U/L (ref 10–44)
ANION GAP SERPL CALC-SCNC: 8 MMOL/L (ref 8–16)
AST SERPL-CCNC: 17 U/L (ref 10–40)
BILIRUB SERPL-MCNC: 0.5 MG/DL (ref 0.1–1)
BNP SERPL-MCNC: 267 PG/ML (ref 0–99)
BUN SERPL-MCNC: 20 MG/DL (ref 8–23)
CALCIUM SERPL-MCNC: 9.3 MG/DL (ref 8.7–10.5)
CHLORIDE SERPL-SCNC: 106 MMOL/L (ref 95–110)
CO2 SERPL-SCNC: 25 MMOL/L (ref 23–29)
CREAT SERPL-MCNC: 1.5 MG/DL (ref 0.5–1.4)
EST. GFR  (AFRICAN AMERICAN): 57 ML/MIN/1.73 M^2
EST. GFR  (NON AFRICAN AMERICAN): 49 ML/MIN/1.73 M^2
GLUCOSE SERPL-MCNC: 104 MG/DL (ref 70–110)
INR PPP: 1.3 (ref 0.8–1.2)
MAGNESIUM SERPL-MCNC: 2 MG/DL (ref 1.6–2.6)
POTASSIUM SERPL-SCNC: 4.2 MMOL/L (ref 3.5–5.1)
PROT SERPL-MCNC: 7.1 G/DL (ref 6–8.4)
PROTHROMBIN TIME: 14 SEC (ref 9–12.5)
SODIUM SERPL-SCNC: 139 MMOL/L (ref 136–145)

## 2020-07-08 PROCEDURE — 85610 PROTHROMBIN TIME: CPT

## 2020-07-08 PROCEDURE — 93793 PR ANTICOAGULANT MGMT FOR PT TAKING WARFARIN: ICD-10-PCS | Mod: S$GLB,,, | Performed by: PHARMACIST

## 2020-07-08 PROCEDURE — 80053 COMPREHEN METABOLIC PANEL: CPT

## 2020-07-08 PROCEDURE — 93793 ANTICOAG MGMT PT WARFARIN: CPT | Mod: S$GLB,,, | Performed by: PHARMACIST

## 2020-07-08 PROCEDURE — 83880 ASSAY OF NATRIURETIC PEPTIDE: CPT

## 2020-07-08 PROCEDURE — 83735 ASSAY OF MAGNESIUM: CPT

## 2020-07-09 ENCOUNTER — OFFICE VISIT (OUTPATIENT)
Dept: TRANSPLANT | Facility: CLINIC | Age: 65
End: 2020-07-09
Payer: MEDICARE

## 2020-07-09 VITALS
DIASTOLIC BLOOD PRESSURE: 59 MMHG | BODY MASS INDEX: 30.51 KG/M2 | WEIGHT: 230.19 LBS | HEART RATE: 66 BPM | SYSTOLIC BLOOD PRESSURE: 108 MMHG | HEIGHT: 73 IN

## 2020-07-09 DIAGNOSIS — I42.0 DILATED CARDIOMYOPATHY: Chronic | ICD-10-CM

## 2020-07-09 DIAGNOSIS — I10 ESSENTIAL HYPERTENSION: Chronic | ICD-10-CM

## 2020-07-09 DIAGNOSIS — Z95.810 BIVENTRICULAR IMPLANTABLE CARDIOVERTER-DEFIBRILLATOR (ICD) IN SITU: ICD-10-CM

## 2020-07-09 DIAGNOSIS — I50.40 COMBINED SYSTOLIC AND DIASTOLIC CONGESTIVE HEART FAILURE, NYHA CLASS 2, UNSPECIFIED CONGESTIVE HEART FAILURE CHRONICITY: ICD-10-CM

## 2020-07-09 DIAGNOSIS — I34.0 SEVERE MITRAL REGURGITATION: Primary | ICD-10-CM

## 2020-07-09 DIAGNOSIS — I44.7 LBBB (LEFT BUNDLE BRANCH BLOCK): ICD-10-CM

## 2020-07-09 DIAGNOSIS — Z79.01 ANTICOAGULATION MONITORING BY PHARMACIST: ICD-10-CM

## 2020-07-09 PROCEDURE — 99999 PR PBB SHADOW E&M-EST. PATIENT-LVL III: CPT | Mod: PBBFAC,,, | Performed by: INTERNAL MEDICINE

## 2020-07-09 PROCEDURE — 3074F PR MOST RECENT SYSTOLIC BLOOD PRESSURE < 130 MM HG: ICD-10-PCS | Mod: CPTII,S$GLB,, | Performed by: INTERNAL MEDICINE

## 2020-07-09 PROCEDURE — 3078F DIAST BP <80 MM HG: CPT | Mod: CPTII,S$GLB,, | Performed by: INTERNAL MEDICINE

## 2020-07-09 PROCEDURE — 3074F SYST BP LT 130 MM HG: CPT | Mod: CPTII,S$GLB,, | Performed by: INTERNAL MEDICINE

## 2020-07-09 PROCEDURE — 3008F BODY MASS INDEX DOCD: CPT | Mod: CPTII,S$GLB,, | Performed by: INTERNAL MEDICINE

## 2020-07-09 PROCEDURE — 99214 PR OFFICE/OUTPT VISIT, EST, LEVL IV, 30-39 MIN: ICD-10-PCS | Mod: S$GLB,,, | Performed by: INTERNAL MEDICINE

## 2020-07-09 PROCEDURE — 3008F PR BODY MASS INDEX (BMI) DOCUMENTED: ICD-10-PCS | Mod: CPTII,S$GLB,, | Performed by: INTERNAL MEDICINE

## 2020-07-09 PROCEDURE — 99999 PR PBB SHADOW E&M-EST. PATIENT-LVL III: ICD-10-PCS | Mod: PBBFAC,,, | Performed by: INTERNAL MEDICINE

## 2020-07-09 PROCEDURE — 99214 OFFICE O/P EST MOD 30 MIN: CPT | Mod: S$GLB,,, | Performed by: INTERNAL MEDICINE

## 2020-07-09 PROCEDURE — 3078F PR MOST RECENT DIASTOLIC BLOOD PRESSURE < 80 MM HG: ICD-10-PCS | Mod: CPTII,S$GLB,, | Performed by: INTERNAL MEDICINE

## 2020-07-09 NOTE — PROGRESS NOTES
Confirmed correct dose of coumadin; states DR advised pt to hold dose a few days after procedure (replace ICD) on 6/24; pt resumed correct dose on 7/1  NO other changes

## 2020-07-09 NOTE — PROGRESS NOTES
Subjective:    Patient ID:  Shae Ramesh III is a 64 y.o. male who presents for follow-up of Congestive Heart Failure      Congestive Heart Failure  Pertinent negatives include no nausea or vomiting.       Mr. 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 Doing well no major symptoms. COntinues to do well      Since last visit he continue to do well FC II NYHA    · Moderate left atrial enlargement.  · Severe left ventricular enlargement.  · Eccentric left ventricular hypertrophy.  · Severely decreased left ventricular systolic function. The estimated ejection fraction is 25%  · Global hypokinetic wall motion.  · Grade I (mild) left ventricular diastolic dysfunction consistent with impaired relaxation.  · Normal right ventricular systolic function.  · Moderate-to-severe mitral regurgitation eccentric jet directed posteriorly.  · Mild tricuspid regurgitation.  · Mild right atrial enlargement.  · Normal central venous pressure (3 mm Hg).  · The estimated PA systolic pressure is 21.32 mm Hg  · No pericardial effusion.    Review of Systems   Constitution: Negative for decreased appetite, malaise/fatigue, night sweats, weight gain and weight loss.   Cardiovascular: Negative for claudication, cyanosis, dyspnea on exertion, irregular heartbeat, leg swelling, near-syncope, orthopnea and palpitations.   Respiratory: Negative for hemoptysis, shortness of breath, sleep disturbances due to breathing, snoring, sputum production and wheezing.    Skin: Positive for color change.   Gastrointestinal: Negative for diarrhea, nausea and vomiting.            Physical Exam   Constitutional: He is oriented to person, place, and time. He appears well-developed and well-nourished.   HENT:   Head: Normocephalic and atraumatic.    Eyes: Pupils are equal, round, and reactive to light. Conjunctivae and EOM are normal.   Neck: Normal range of motion. Neck supple. No JVD present.   Cardiovascular: Normal rate and regular rhythm. Exam reveals no gallop and no friction rub.   Murmur heard.   Holosystolic murmur is present with a grade of 2/6.  Pulmonary/Chest: Breath sounds normal. No respiratory distress. He has no wheezes. He has no rales. He exhibits no tenderness.   Abdominal: Soft. Bowel sounds are normal. He exhibits no distension and no mass. There is no hepatosplenomegaly, splenomegaly or hepatomegaly. There is no abdominal tenderness. There is no rebound, no guarding and no CVA tenderness.   Musculoskeletal: Normal range of motion.         General: No tenderness.   Neurological: He is alert and oriented to person, place, and time. He has normal reflexes. No cranial nerve deficit. He exhibits normal muscle tone. Coordination normal.   Skin: Skin is warm and dry.   Bilateral lower extremity erythema to the anterior shin with prominent varicose veins.   Psychiatric: He has a normal mood and affect.     Assessment:       1. Severe mitral regurgitation    2. LBBB (left bundle branch block)    3. Essential hypertension    4. Dilated cardiomyopathy    5. Combined systolic and diastolic congestive heart failure, NYHA class 2, unspecified congestive heart failure chronicity    6. Biventricular implantable cardioverter-defibrillator (ICD) in situ    7. Anticoagulation monitoring by pharmacist         Plan:     HFrEF doing well FC II NYHA Still MR despite medical therapy but he is asymptomatic. May require mitral clip??    RTC 6 months with CPX

## 2020-07-09 NOTE — LETTER
July 9, 2020        Gabriel Howard  [address]  [address]  [place] [address]  Phone: [phone]  Fax: [phone]     Ming Pate  [address] [place] [address] [address]  [place] [address]  Phone: [phone]  Fax: [phone]             [facility]  [address]  [facility] [address]  Phone: [phone]   Patient: Shae Ramesh III   MR Number: [id number]   YOB: 1955   Date of Visit: 7/9/2020       Dear Dr. Ming Pate, Gabriel Howard    Thank you for referring Shae Ramesh to me for evaluation. Attached you will find relevant portions of my assessment and plan of care.    If you have questions, please do not hesitate to call me. I look forward to following Shae Ramesh along with you.    Sincerely,    James Vieyra MD    Enclosure    If you would like to receive this communication electronically, please contact [email or web address] or [phone] to request [employer] access.    [employer] is a tool which provides read-only access to select patient information with whom you have a relationship. Its easy to use and provides real time access to review your patients record including encounter summaries, notes, results, and demographic information.    If you feel you have received this communication in error or would no longer like to receive these types of communications, please e-mail [email or web address]

## 2020-07-15 ENCOUNTER — LAB VISIT (OUTPATIENT)
Dept: LAB | Facility: HOSPITAL | Age: 65
End: 2020-07-15
Attending: INTERNAL MEDICINE
Payer: MEDICARE

## 2020-07-15 ENCOUNTER — ANTI-COAG VISIT (OUTPATIENT)
Dept: CARDIOLOGY | Facility: CLINIC | Age: 65
End: 2020-07-15
Payer: MEDICARE

## 2020-07-15 DIAGNOSIS — Z79.01 CURRENT USE OF LONG TERM ANTICOAGULATION: ICD-10-CM

## 2020-07-15 DIAGNOSIS — I48.0 PAROXYSMAL ATRIAL FIBRILLATION: ICD-10-CM

## 2020-07-15 LAB
INR PPP: 1.6 (ref 0.8–1.2)
PROTHROMBIN TIME: 17.4 SEC (ref 9–12.5)

## 2020-07-15 PROCEDURE — 93793 PR ANTICOAGULANT MGMT FOR PT TAKING WARFARIN: ICD-10-PCS | Mod: S$GLB,,, | Performed by: PHARMACIST

## 2020-07-15 PROCEDURE — 93793 ANTICOAG MGMT PT WARFARIN: CPT | Mod: S$GLB,,, | Performed by: PHARMACIST

## 2020-07-15 PROCEDURE — 36415 COLL VENOUS BLD VENIPUNCTURE: CPT | Mod: PO

## 2020-07-15 PROCEDURE — 85610 PROTHROMBIN TIME: CPT

## 2020-07-22 ENCOUNTER — LAB VISIT (OUTPATIENT)
Dept: LAB | Facility: HOSPITAL | Age: 65
End: 2020-07-22
Attending: INTERNAL MEDICINE
Payer: MEDICARE

## 2020-07-22 ENCOUNTER — ANTI-COAG VISIT (OUTPATIENT)
Dept: CARDIOLOGY | Facility: CLINIC | Age: 65
End: 2020-07-22
Payer: MEDICARE

## 2020-07-22 DIAGNOSIS — Z79.01 CURRENT USE OF LONG TERM ANTICOAGULATION: ICD-10-CM

## 2020-07-22 DIAGNOSIS — I48.0 PAROXYSMAL ATRIAL FIBRILLATION: ICD-10-CM

## 2020-07-22 LAB
INR PPP: 2.2 (ref 0.8–1.2)
PROTHROMBIN TIME: 24.7 SEC (ref 9–12.5)

## 2020-07-22 PROCEDURE — 36415 COLL VENOUS BLD VENIPUNCTURE: CPT | Mod: PO

## 2020-07-22 PROCEDURE — 85610 PROTHROMBIN TIME: CPT

## 2020-07-22 PROCEDURE — 93793 PR ANTICOAGULANT MGMT FOR PT TAKING WARFARIN: ICD-10-PCS | Mod: S$GLB,,, | Performed by: PHARMACIST

## 2020-07-22 PROCEDURE — 93793 ANTICOAG MGMT PT WARFARIN: CPT | Mod: S$GLB,,, | Performed by: PHARMACIST

## 2020-07-26 ENCOUNTER — CLINICAL SUPPORT (OUTPATIENT)
Dept: CARDIOLOGY | Facility: HOSPITAL | Age: 65
End: 2020-07-26
Payer: MEDICARE

## 2020-07-26 DIAGNOSIS — I25.5 ISCHEMIC CARDIOMYOPATHY: ICD-10-CM

## 2020-07-26 DIAGNOSIS — Z95.810 PRESENCE OF AUTOMATIC (IMPLANTABLE) CARDIAC DEFIBRILLATOR: ICD-10-CM

## 2020-07-26 DIAGNOSIS — I50.9 HEART FAILURE, UNSPECIFIED: ICD-10-CM

## 2020-07-26 DIAGNOSIS — I47.20 VENTRICULAR TACHYCARDIA: ICD-10-CM

## 2020-07-26 PROCEDURE — 93295 CARDIAC DEVICE CHECK - REMOTE: ICD-10-PCS | Mod: ,,, | Performed by: INTERNAL MEDICINE

## 2020-07-26 PROCEDURE — 93295 DEV INTERROG REMOTE 1/2/MLT: CPT | Mod: ,,, | Performed by: INTERNAL MEDICINE

## 2020-07-26 PROCEDURE — 93296 REM INTERROG EVL PM/IDS: CPT | Performed by: INTERNAL MEDICINE

## 2020-08-05 ENCOUNTER — ANTI-COAG VISIT (OUTPATIENT)
Dept: CARDIOLOGY | Facility: CLINIC | Age: 65
End: 2020-08-05
Payer: MEDICARE

## 2020-08-05 ENCOUNTER — LAB VISIT (OUTPATIENT)
Dept: LAB | Facility: HOSPITAL | Age: 65
End: 2020-08-05
Attending: INTERNAL MEDICINE
Payer: MEDICARE

## 2020-08-05 DIAGNOSIS — Z79.01 CURRENT USE OF LONG TERM ANTICOAGULATION: ICD-10-CM

## 2020-08-05 DIAGNOSIS — I48.0 PAROXYSMAL ATRIAL FIBRILLATION: ICD-10-CM

## 2020-08-05 LAB
INR PPP: 2.6 (ref 0.8–1.2)
PROTHROMBIN TIME: 28.8 SEC (ref 9–12.5)

## 2020-08-05 PROCEDURE — 93793 ANTICOAG MGMT PT WARFARIN: CPT | Mod: S$GLB,,, | Performed by: PHARMACIST

## 2020-08-05 PROCEDURE — 85610 PROTHROMBIN TIME: CPT

## 2020-08-05 PROCEDURE — 93793 PR ANTICOAGULANT MGMT FOR PT TAKING WARFARIN: ICD-10-PCS | Mod: S$GLB,,, | Performed by: PHARMACIST

## 2020-08-05 PROCEDURE — 36415 COLL VENOUS BLD VENIPUNCTURE: CPT | Mod: PO

## 2020-08-26 ENCOUNTER — LAB VISIT (OUTPATIENT)
Dept: LAB | Facility: HOSPITAL | Age: 65
End: 2020-08-26
Attending: INTERNAL MEDICINE
Payer: MEDICARE

## 2020-08-26 DIAGNOSIS — I48.0 PAROXYSMAL ATRIAL FIBRILLATION: ICD-10-CM

## 2020-08-26 DIAGNOSIS — Z79.01 CURRENT USE OF LONG TERM ANTICOAGULATION: ICD-10-CM

## 2020-08-26 LAB
INR PPP: 3.1 (ref 0.8–1.2)
PROTHROMBIN TIME: 34.4 SEC (ref 9–12.5)

## 2020-08-26 PROCEDURE — 36415 COLL VENOUS BLD VENIPUNCTURE: CPT | Mod: PO

## 2020-08-26 PROCEDURE — 85610 PROTHROMBIN TIME: CPT

## 2020-08-27 ENCOUNTER — ANTI-COAG VISIT (OUTPATIENT)
Dept: CARDIOLOGY | Facility: CLINIC | Age: 65
End: 2020-08-27
Payer: MEDICARE

## 2020-08-27 DIAGNOSIS — Z79.01 CURRENT USE OF LONG TERM ANTICOAGULATION: ICD-10-CM

## 2020-08-27 PROCEDURE — 93793 ANTICOAG MGMT PT WARFARIN: CPT | Mod: S$GLB,,, | Performed by: PHARMACIST

## 2020-08-27 PROCEDURE — 93793 PR ANTICOAGULANT MGMT FOR PT TAKING WARFARIN: ICD-10-PCS | Mod: S$GLB,,, | Performed by: PHARMACIST

## 2020-09-13 ENCOUNTER — CLINICAL SUPPORT (OUTPATIENT)
Dept: CARDIOLOGY | Facility: HOSPITAL | Age: 65
End: 2020-09-13
Payer: MEDICARE

## 2020-09-13 DIAGNOSIS — Z95.810 PRESENCE OF AUTOMATIC (IMPLANTABLE) CARDIAC DEFIBRILLATOR: ICD-10-CM

## 2020-09-13 PROCEDURE — 93295 DEV INTERROG REMOTE 1/2/MLT: CPT | Mod: S$GLB,,, | Performed by: INTERNAL MEDICINE

## 2020-09-13 PROCEDURE — 93296 REM INTERROG EVL PM/IDS: CPT | Performed by: INTERNAL MEDICINE

## 2020-09-13 PROCEDURE — 93295 CARDIAC DEVICE CHECK - REMOTE: ICD-10-PCS | Mod: S$GLB,,, | Performed by: INTERNAL MEDICINE

## 2020-09-16 ENCOUNTER — ANTI-COAG VISIT (OUTPATIENT)
Dept: CARDIOLOGY | Facility: CLINIC | Age: 65
End: 2020-09-16
Payer: MEDICARE

## 2020-09-16 ENCOUNTER — LAB VISIT (OUTPATIENT)
Dept: LAB | Facility: HOSPITAL | Age: 65
End: 2020-09-16
Attending: INTERNAL MEDICINE
Payer: MEDICARE

## 2020-09-16 DIAGNOSIS — Z79.01 CURRENT USE OF LONG TERM ANTICOAGULATION: ICD-10-CM

## 2020-09-16 DIAGNOSIS — I48.0 PAROXYSMAL ATRIAL FIBRILLATION: ICD-10-CM

## 2020-09-16 LAB
INR PPP: 3 (ref 0.8–1.2)
PROTHROMBIN TIME: 32.7 SEC (ref 9–12.5)

## 2020-09-16 PROCEDURE — 36415 COLL VENOUS BLD VENIPUNCTURE: CPT | Mod: PO

## 2020-09-16 PROCEDURE — 85610 PROTHROMBIN TIME: CPT

## 2020-09-16 PROCEDURE — 93793 ANTICOAG MGMT PT WARFARIN: CPT | Mod: S$GLB,,, | Performed by: PHARMACIST

## 2020-09-16 PROCEDURE — 93793 PR ANTICOAGULANT MGMT FOR PT TAKING WARFARIN: ICD-10-PCS | Mod: S$GLB,,, | Performed by: PHARMACIST

## 2020-09-18 DIAGNOSIS — I48.0 PAROXYSMAL ATRIAL FIBRILLATION: ICD-10-CM

## 2020-09-18 DIAGNOSIS — Z95.810 AICD (AUTOMATIC CARDIOVERTER/DEFIBRILLATOR) PRESENT: Primary | ICD-10-CM

## 2020-10-26 ENCOUNTER — TELEPHONE (OUTPATIENT)
Dept: CARDIOLOGY | Facility: HOSPITAL | Age: 65
End: 2020-10-26

## 2020-10-26 NOTE — TELEPHONE ENCOUNTER
Called pt and left VM in reference to his two missed appts on 10/19/20 with Dr. Martinez and the device clinic.

## 2020-12-12 ENCOUNTER — CLINICAL SUPPORT (OUTPATIENT)
Dept: CARDIOLOGY | Facility: HOSPITAL | Age: 65
End: 2020-12-12
Payer: MEDICARE

## 2020-12-12 DIAGNOSIS — Z95.810 PRESENCE OF AUTOMATIC (IMPLANTABLE) CARDIAC DEFIBRILLATOR: ICD-10-CM

## 2020-12-12 PROCEDURE — 93295 CARDIAC DEVICE CHECK - REMOTE: ICD-10-PCS | Mod: ,,, | Performed by: INTERNAL MEDICINE

## 2020-12-12 PROCEDURE — 93296 REM INTERROG EVL PM/IDS: CPT | Mod: PBBFAC | Performed by: INTERNAL MEDICINE

## 2020-12-12 PROCEDURE — 93295 DEV INTERROG REMOTE 1/2/MLT: CPT | Mod: ,,, | Performed by: INTERNAL MEDICINE

## 2021-01-12 ENCOUNTER — LAB VISIT (OUTPATIENT)
Dept: LAB | Facility: HOSPITAL | Age: 66
End: 2021-01-12
Attending: INTERNAL MEDICINE
Payer: MEDICARE

## 2021-01-12 DIAGNOSIS — Z79.01 CURRENT USE OF LONG TERM ANTICOAGULATION: ICD-10-CM

## 2021-01-12 DIAGNOSIS — I48.0 PAROXYSMAL ATRIAL FIBRILLATION: ICD-10-CM

## 2021-01-12 LAB
INR PPP: 1.7 (ref 0.8–1.2)
PROTHROMBIN TIME: 17.5 SEC (ref 9–12.5)

## 2021-01-12 PROCEDURE — 36415 COLL VENOUS BLD VENIPUNCTURE: CPT | Mod: PO

## 2021-01-12 PROCEDURE — 85610 PROTHROMBIN TIME: CPT

## 2021-01-13 ENCOUNTER — ANTI-COAG VISIT (OUTPATIENT)
Dept: CARDIOLOGY | Facility: CLINIC | Age: 66
End: 2021-01-13
Payer: MEDICARE

## 2021-01-13 DIAGNOSIS — Z79.01 CURRENT USE OF LONG TERM ANTICOAGULATION: ICD-10-CM

## 2021-01-13 PROCEDURE — 93793 PR ANTICOAGULANT MGMT FOR PT TAKING WARFARIN: ICD-10-PCS | Mod: S$GLB,,,

## 2021-01-13 PROCEDURE — 93793 ANTICOAG MGMT PT WARFARIN: CPT | Mod: S$GLB,,,

## 2021-01-26 ENCOUNTER — ANTI-COAG VISIT (OUTPATIENT)
Dept: CARDIOLOGY | Facility: CLINIC | Age: 66
End: 2021-01-26
Payer: MEDICARE

## 2021-01-26 ENCOUNTER — LAB VISIT (OUTPATIENT)
Dept: LAB | Facility: HOSPITAL | Age: 66
End: 2021-01-26
Attending: INTERNAL MEDICINE
Payer: MEDICARE

## 2021-01-26 DIAGNOSIS — Z79.01 CURRENT USE OF LONG TERM ANTICOAGULATION: Primary | ICD-10-CM

## 2021-01-26 DIAGNOSIS — Z79.01 CURRENT USE OF LONG TERM ANTICOAGULATION: ICD-10-CM

## 2021-01-26 DIAGNOSIS — I48.0 PAROXYSMAL ATRIAL FIBRILLATION: ICD-10-CM

## 2021-01-26 LAB
INR PPP: 1.9 (ref 0.8–1.2)
PROTHROMBIN TIME: 19.6 SEC (ref 9–12.5)

## 2021-01-26 PROCEDURE — 36415 COLL VENOUS BLD VENIPUNCTURE: CPT | Mod: PO

## 2021-01-26 PROCEDURE — 85610 PROTHROMBIN TIME: CPT

## 2021-01-26 PROCEDURE — 93793 PR ANTICOAGULANT MGMT FOR PT TAKING WARFARIN: ICD-10-PCS | Mod: S$GLB,,, | Performed by: PHARMACIST

## 2021-01-26 PROCEDURE — 93793 ANTICOAG MGMT PT WARFARIN: CPT | Mod: S$GLB,,, | Performed by: PHARMACIST

## 2021-02-09 ENCOUNTER — ANTI-COAG VISIT (OUTPATIENT)
Dept: CARDIOLOGY | Facility: CLINIC | Age: 66
End: 2021-02-09
Payer: MEDICARE

## 2021-02-09 ENCOUNTER — LAB VISIT (OUTPATIENT)
Dept: LAB | Facility: HOSPITAL | Age: 66
End: 2021-02-09
Attending: INTERNAL MEDICINE
Payer: MEDICARE

## 2021-02-09 DIAGNOSIS — Z79.01 CURRENT USE OF LONG TERM ANTICOAGULATION: Primary | ICD-10-CM

## 2021-02-09 DIAGNOSIS — I48.0 PAROXYSMAL ATRIAL FIBRILLATION: ICD-10-CM

## 2021-02-09 DIAGNOSIS — Z79.01 CURRENT USE OF LONG TERM ANTICOAGULATION: ICD-10-CM

## 2021-02-09 LAB
INR PPP: 2.3 (ref 0.8–1.2)
PROTHROMBIN TIME: 23.5 SEC (ref 9–12.5)

## 2021-02-09 PROCEDURE — 93793 ANTICOAG MGMT PT WARFARIN: CPT | Mod: S$GLB,,, | Performed by: PHARMACIST

## 2021-02-09 PROCEDURE — 85610 PROTHROMBIN TIME: CPT

## 2021-02-09 PROCEDURE — 93793 PR ANTICOAGULANT MGMT FOR PT TAKING WARFARIN: ICD-10-PCS | Mod: S$GLB,,, | Performed by: PHARMACIST

## 2021-02-09 PROCEDURE — 36415 COLL VENOUS BLD VENIPUNCTURE: CPT | Mod: PO

## 2021-03-03 ENCOUNTER — LAB VISIT (OUTPATIENT)
Dept: LAB | Facility: HOSPITAL | Age: 66
End: 2021-03-03
Attending: INTERNAL MEDICINE
Payer: MEDICARE

## 2021-03-03 DIAGNOSIS — Z45.02 BIVENTRICULAR IMPLANTABLE CARDIOVERTER-DEFIBRILLATOR (ICD) AT END OF DEVICE LIFE: ICD-10-CM

## 2021-03-03 DIAGNOSIS — I50.42 CHRONIC COMBINED SYSTOLIC AND DIASTOLIC CHF, NYHA CLASS 2: ICD-10-CM

## 2021-03-03 DIAGNOSIS — Z45.010 PACEMAKER AT END OF BATTERY LIFE: ICD-10-CM

## 2021-03-03 LAB
INR PPP: 2.1 (ref 0.8–1.2)
PROTHROMBIN TIME: 21.9 SEC (ref 9–12.5)

## 2021-03-03 PROCEDURE — 36415 COLL VENOUS BLD VENIPUNCTURE: CPT | Mod: PO | Performed by: INTERNAL MEDICINE

## 2021-03-03 PROCEDURE — 85610 PROTHROMBIN TIME: CPT | Performed by: INTERNAL MEDICINE

## 2021-03-04 ENCOUNTER — ANTI-COAG VISIT (OUTPATIENT)
Dept: CARDIOLOGY | Facility: CLINIC | Age: 66
End: 2021-03-04
Payer: MEDICARE

## 2021-03-04 DIAGNOSIS — Z79.01 CURRENT USE OF LONG TERM ANTICOAGULATION: Primary | ICD-10-CM

## 2021-03-04 PROCEDURE — 93793 ANTICOAG MGMT PT WARFARIN: CPT | Mod: S$GLB,,, | Performed by: PHARMACIST

## 2021-03-04 PROCEDURE — 93793 PR ANTICOAGULANT MGMT FOR PT TAKING WARFARIN: ICD-10-PCS | Mod: S$GLB,,, | Performed by: PHARMACIST

## 2021-03-12 ENCOUNTER — CLINICAL SUPPORT (OUTPATIENT)
Dept: CARDIOLOGY | Facility: HOSPITAL | Age: 66
End: 2021-03-12
Payer: MEDICARE

## 2021-03-12 DIAGNOSIS — Z95.810 PRESENCE OF AUTOMATIC (IMPLANTABLE) CARDIAC DEFIBRILLATOR: ICD-10-CM

## 2021-03-12 PROCEDURE — 93295 DEV INTERROG REMOTE 1/2/MLT: CPT | Mod: S$GLB,,, | Performed by: INTERNAL MEDICINE

## 2021-03-12 PROCEDURE — 93296 REM INTERROG EVL PM/IDS: CPT | Performed by: INTERNAL MEDICINE

## 2021-03-12 PROCEDURE — 93295 CARDIAC DEVICE CHECK - REMOTE: ICD-10-PCS | Mod: S$GLB,,, | Performed by: INTERNAL MEDICINE

## 2021-03-31 ENCOUNTER — LAB VISIT (OUTPATIENT)
Dept: LAB | Facility: HOSPITAL | Age: 66
End: 2021-03-31
Attending: INTERNAL MEDICINE
Payer: MEDICARE

## 2021-03-31 ENCOUNTER — ANTI-COAG VISIT (OUTPATIENT)
Dept: CARDIOLOGY | Facility: CLINIC | Age: 66
End: 2021-03-31
Payer: MEDICARE

## 2021-03-31 DIAGNOSIS — Z79.01 CURRENT USE OF LONG TERM ANTICOAGULATION: ICD-10-CM

## 2021-03-31 DIAGNOSIS — Z79.01 CURRENT USE OF LONG TERM ANTICOAGULATION: Primary | ICD-10-CM

## 2021-03-31 DIAGNOSIS — I48.0 PAROXYSMAL ATRIAL FIBRILLATION: ICD-10-CM

## 2021-03-31 LAB
INR PPP: 2.8 (ref 0.8–1.2)
PROTHROMBIN TIME: 28.3 SEC (ref 9–12.5)

## 2021-03-31 PROCEDURE — 93793 ANTICOAG MGMT PT WARFARIN: CPT | Mod: S$GLB,,, | Performed by: PHARMACIST

## 2021-03-31 PROCEDURE — 85610 PROTHROMBIN TIME: CPT | Performed by: INTERNAL MEDICINE

## 2021-03-31 PROCEDURE — 93793 PR ANTICOAGULANT MGMT FOR PT TAKING WARFARIN: ICD-10-PCS | Mod: S$GLB,,, | Performed by: PHARMACIST

## 2021-03-31 PROCEDURE — 36415 COLL VENOUS BLD VENIPUNCTURE: CPT | Mod: PO | Performed by: INTERNAL MEDICINE

## 2021-04-08 RX ORDER — WARFARIN SODIUM 5 MG/1
TABLET ORAL
Qty: 45 TABLET | Refills: 5 | Status: SHIPPED | OUTPATIENT
Start: 2021-04-08 | End: 2021-09-15 | Stop reason: SDUPTHER

## 2021-04-12 ENCOUNTER — CLINICAL SUPPORT (OUTPATIENT)
Dept: CARDIOLOGY | Facility: HOSPITAL | Age: 66
End: 2021-04-12
Attending: INTERNAL MEDICINE
Payer: MEDICARE

## 2021-04-12 ENCOUNTER — LAB VISIT (OUTPATIENT)
Dept: LAB | Facility: HOSPITAL | Age: 66
End: 2021-04-12
Attending: INTERNAL MEDICINE
Payer: MEDICARE

## 2021-04-12 ENCOUNTER — OFFICE VISIT (OUTPATIENT)
Dept: CARDIOLOGY | Facility: CLINIC | Age: 66
End: 2021-04-12
Payer: MEDICARE

## 2021-04-12 VITALS
HEART RATE: 74 BPM | BODY MASS INDEX: 30.1 KG/M2 | WEIGHT: 228.19 LBS | OXYGEN SATURATION: 95 % | DIASTOLIC BLOOD PRESSURE: 66 MMHG | SYSTOLIC BLOOD PRESSURE: 99 MMHG

## 2021-04-12 DIAGNOSIS — I42.0 DCM (DILATED CARDIOMYOPATHY): ICD-10-CM

## 2021-04-12 DIAGNOSIS — Z95.810 AICD (AUTOMATIC CARDIOVERTER/DEFIBRILLATOR) PRESENT: ICD-10-CM

## 2021-04-12 DIAGNOSIS — I50.42 CHRONIC COMBINED SYSTOLIC AND DIASTOLIC CONGESTIVE HEART FAILURE, NYHA CLASS 2: Primary | ICD-10-CM

## 2021-04-12 DIAGNOSIS — Z95.810 AICD (AUTOMATIC CARDIOVERTER/DEFIBRILLATOR) PRESENT: Primary | ICD-10-CM

## 2021-04-12 DIAGNOSIS — I50.9 HEART FAILURE, UNSPECIFIED HF CHRONICITY, UNSPECIFIED HEART FAILURE TYPE: ICD-10-CM

## 2021-04-12 DIAGNOSIS — Z79.899 ON AMIODARONE THERAPY: ICD-10-CM

## 2021-04-12 DIAGNOSIS — I48.0 PAROXYSMAL ATRIAL FIBRILLATION: ICD-10-CM

## 2021-04-12 DIAGNOSIS — Z79.01 CURRENT USE OF LONG TERM ANTICOAGULATION: ICD-10-CM

## 2021-04-12 DIAGNOSIS — E66.9 OBESITY (BMI 30.0-34.9): ICD-10-CM

## 2021-04-12 DIAGNOSIS — I50.40 COMBINED SYSTOLIC AND DIASTOLIC CONGESTIVE HEART FAILURE, NYHA CLASS 2, UNSPECIFIED CONGESTIVE HEART FAILURE CHRONICITY: ICD-10-CM

## 2021-04-12 PROCEDURE — 3008F PR BODY MASS INDEX (BMI) DOCUMENTED: ICD-10-PCS | Mod: CPTII,S$GLB,, | Performed by: NURSE PRACTITIONER

## 2021-04-12 PROCEDURE — 93284 CARDIAC DEVICE CHECK - IN CLINIC & HOSPITAL: ICD-10-PCS | Mod: 26,,, | Performed by: INTERNAL MEDICINE

## 2021-04-12 PROCEDURE — 83880 ASSAY OF NATRIURETIC PEPTIDE: CPT | Performed by: INTERNAL MEDICINE

## 2021-04-12 PROCEDURE — 99999 PR PBB SHADOW E&M-EST. PATIENT-LVL I: CPT | Mod: PBBFAC,,,

## 2021-04-12 PROCEDURE — 93284 PRGRMG EVAL IMPLANTABLE DFB: CPT

## 2021-04-12 PROCEDURE — 93284 PRGRMG EVAL IMPLANTABLE DFB: CPT | Mod: 26,,, | Performed by: INTERNAL MEDICINE

## 2021-04-12 PROCEDURE — 3008F BODY MASS INDEX DOCD: CPT | Mod: CPTII,S$GLB,, | Performed by: NURSE PRACTITIONER

## 2021-04-12 PROCEDURE — 99999 PR PBB SHADOW E&M-EST. PATIENT-LVL III: CPT | Mod: PBBFAC,,, | Performed by: NURSE PRACTITIONER

## 2021-04-12 PROCEDURE — 99214 OFFICE O/P EST MOD 30 MIN: CPT | Mod: S$GLB,,, | Performed by: NURSE PRACTITIONER

## 2021-04-12 PROCEDURE — 36415 COLL VENOUS BLD VENIPUNCTURE: CPT | Performed by: INTERNAL MEDICINE

## 2021-04-12 PROCEDURE — 99999 PR PBB SHADOW E&M-EST. PATIENT-LVL I: ICD-10-PCS | Mod: PBBFAC,,,

## 2021-04-12 PROCEDURE — 99999 PR PBB SHADOW E&M-EST. PATIENT-LVL III: ICD-10-PCS | Mod: PBBFAC,,, | Performed by: NURSE PRACTITIONER

## 2021-04-12 PROCEDURE — 99214 PR OFFICE/OUTPT VISIT, EST, LEVL IV, 30-39 MIN: ICD-10-PCS | Mod: S$GLB,,, | Performed by: NURSE PRACTITIONER

## 2021-04-13 LAB — BNP SERPL-MCNC: 78 PG/ML (ref 0–99)

## 2021-04-14 ENCOUNTER — TELEPHONE (OUTPATIENT)
Dept: CARDIOLOGY | Facility: CLINIC | Age: 66
End: 2021-04-14

## 2021-04-28 ENCOUNTER — LAB VISIT (OUTPATIENT)
Dept: LAB | Facility: HOSPITAL | Age: 66
End: 2021-04-28
Attending: INTERNAL MEDICINE
Payer: MEDICARE

## 2021-04-28 ENCOUNTER — ANTI-COAG VISIT (OUTPATIENT)
Dept: CARDIOLOGY | Facility: CLINIC | Age: 66
End: 2021-04-28
Payer: MEDICARE

## 2021-04-28 DIAGNOSIS — Z79.01 CURRENT USE OF LONG TERM ANTICOAGULATION: ICD-10-CM

## 2021-04-28 DIAGNOSIS — Z79.01 CURRENT USE OF LONG TERM ANTICOAGULATION: Primary | ICD-10-CM

## 2021-04-28 DIAGNOSIS — I48.0 PAROXYSMAL ATRIAL FIBRILLATION: ICD-10-CM

## 2021-04-28 LAB
INR PPP: 1.9 (ref 0.8–1.2)
PROTHROMBIN TIME: 19.8 SEC (ref 9–12.5)

## 2021-04-28 PROCEDURE — 85610 PROTHROMBIN TIME: CPT | Performed by: INTERNAL MEDICINE

## 2021-04-28 PROCEDURE — 93793 ANTICOAG MGMT PT WARFARIN: CPT | Mod: S$GLB,,, | Performed by: PHARMACIST

## 2021-04-28 PROCEDURE — 36415 COLL VENOUS BLD VENIPUNCTURE: CPT | Mod: PO | Performed by: INTERNAL MEDICINE

## 2021-04-28 PROCEDURE — 93793 PR ANTICOAGULANT MGMT FOR PT TAKING WARFARIN: ICD-10-PCS | Mod: S$GLB,,, | Performed by: PHARMACIST

## 2021-06-09 ENCOUNTER — ANTI-COAG VISIT (OUTPATIENT)
Dept: CARDIOLOGY | Facility: CLINIC | Age: 66
End: 2021-06-09
Payer: MEDICARE

## 2021-06-09 ENCOUNTER — LAB VISIT (OUTPATIENT)
Dept: LAB | Facility: HOSPITAL | Age: 66
End: 2021-06-09
Attending: INTERNAL MEDICINE
Payer: MEDICARE

## 2021-06-09 DIAGNOSIS — Z79.01 CURRENT USE OF LONG TERM ANTICOAGULATION: ICD-10-CM

## 2021-06-09 DIAGNOSIS — Z79.01 CURRENT USE OF LONG TERM ANTICOAGULATION: Primary | ICD-10-CM

## 2021-06-09 LAB
INR PPP: 2.2 (ref 0.8–1.2)
PROTHROMBIN TIME: 22.3 SEC (ref 9–12.5)

## 2021-06-09 PROCEDURE — 85610 PROTHROMBIN TIME: CPT | Performed by: INTERNAL MEDICINE

## 2021-06-09 PROCEDURE — 93793 ANTICOAG MGMT PT WARFARIN: CPT | Mod: S$GLB,,, | Performed by: PHARMACIST

## 2021-06-09 PROCEDURE — 93793 PR ANTICOAGULANT MGMT FOR PT TAKING WARFARIN: ICD-10-PCS | Mod: S$GLB,,, | Performed by: PHARMACIST

## 2021-06-09 PROCEDURE — 36415 COLL VENOUS BLD VENIPUNCTURE: CPT | Mod: PO | Performed by: INTERNAL MEDICINE

## 2021-06-10 ENCOUNTER — CLINICAL SUPPORT (OUTPATIENT)
Dept: CARDIOLOGY | Facility: HOSPITAL | Age: 66
End: 2021-06-10
Payer: MEDICARE

## 2021-06-10 DIAGNOSIS — Z95.810 PRESENCE OF AUTOMATIC (IMPLANTABLE) CARDIAC DEFIBRILLATOR: ICD-10-CM

## 2021-06-10 PROCEDURE — 93296 REM INTERROG EVL PM/IDS: CPT | Performed by: INTERNAL MEDICINE

## 2021-06-10 PROCEDURE — 93295 CARDIAC DEVICE CHECK - REMOTE: ICD-10-PCS | Mod: S$GLB,,, | Performed by: INTERNAL MEDICINE

## 2021-06-10 PROCEDURE — 93295 DEV INTERROG REMOTE 1/2/MLT: CPT | Mod: S$GLB,,, | Performed by: INTERNAL MEDICINE

## 2021-07-21 ENCOUNTER — ANTI-COAG VISIT (OUTPATIENT)
Dept: CARDIOLOGY | Facility: CLINIC | Age: 66
End: 2021-07-21
Payer: MEDICARE

## 2021-07-21 ENCOUNTER — LAB VISIT (OUTPATIENT)
Dept: LAB | Facility: HOSPITAL | Age: 66
End: 2021-07-21
Attending: INTERNAL MEDICINE
Payer: MEDICARE

## 2021-07-21 DIAGNOSIS — Z79.01 CURRENT USE OF LONG TERM ANTICOAGULATION: Primary | ICD-10-CM

## 2021-07-21 DIAGNOSIS — Z79.01 CURRENT USE OF LONG TERM ANTICOAGULATION: ICD-10-CM

## 2021-07-21 LAB
INR PPP: 2 (ref 0.8–1.2)
PROTHROMBIN TIME: 20.7 SEC (ref 9–12.5)

## 2021-07-21 PROCEDURE — 36415 COLL VENOUS BLD VENIPUNCTURE: CPT | Mod: PO | Performed by: INTERNAL MEDICINE

## 2021-07-21 PROCEDURE — 93793 ANTICOAG MGMT PT WARFARIN: CPT | Mod: S$GLB,,, | Performed by: PHARMACIST

## 2021-07-21 PROCEDURE — 85610 PROTHROMBIN TIME: CPT | Performed by: INTERNAL MEDICINE

## 2021-07-21 PROCEDURE — 93793 PR ANTICOAGULANT MGMT FOR PT TAKING WARFARIN: ICD-10-PCS | Mod: S$GLB,,, | Performed by: PHARMACIST

## 2021-09-08 ENCOUNTER — CLINICAL SUPPORT (OUTPATIENT)
Dept: CARDIOLOGY | Facility: HOSPITAL | Age: 66
End: 2021-09-08
Payer: MEDICARE

## 2021-09-08 DIAGNOSIS — Z95.810 PRESENCE OF AUTOMATIC (IMPLANTABLE) CARDIAC DEFIBRILLATOR: ICD-10-CM

## 2021-09-08 PROCEDURE — 93295 CARDIAC DEVICE CHECK - REMOTE: ICD-10-PCS | Mod: S$GLB,,, | Performed by: INTERNAL MEDICINE

## 2021-09-08 PROCEDURE — 93296 REM INTERROG EVL PM/IDS: CPT | Performed by: INTERNAL MEDICINE

## 2021-09-08 PROCEDURE — 93295 DEV INTERROG REMOTE 1/2/MLT: CPT | Mod: S$GLB,,, | Performed by: INTERNAL MEDICINE

## 2021-09-16 ENCOUNTER — LAB VISIT (OUTPATIENT)
Dept: LAB | Facility: HOSPITAL | Age: 66
End: 2021-09-16
Payer: MEDICARE

## 2021-09-16 ENCOUNTER — ANTI-COAG VISIT (OUTPATIENT)
Dept: CARDIOLOGY | Facility: CLINIC | Age: 66
End: 2021-09-16
Payer: MEDICARE

## 2021-09-16 DIAGNOSIS — Z79.01 CURRENT USE OF LONG TERM ANTICOAGULATION: Primary | ICD-10-CM

## 2021-09-16 DIAGNOSIS — Z79.01 CURRENT USE OF LONG TERM ANTICOAGULATION: ICD-10-CM

## 2021-09-16 LAB
INR PPP: 2.4 (ref 0.8–1.2)
PROTHROMBIN TIME: 24.2 SEC (ref 9–12.5)

## 2021-09-16 PROCEDURE — 85610 PROTHROMBIN TIME: CPT | Performed by: INTERNAL MEDICINE

## 2021-09-16 PROCEDURE — 93793 PR ANTICOAGULANT MGMT FOR PT TAKING WARFARIN: ICD-10-PCS | Mod: S$GLB,,, | Performed by: PHARMACIST

## 2021-09-16 PROCEDURE — 36415 COLL VENOUS BLD VENIPUNCTURE: CPT | Mod: PO | Performed by: INTERNAL MEDICINE

## 2021-09-16 PROCEDURE — 93793 ANTICOAG MGMT PT WARFARIN: CPT | Mod: S$GLB,,, | Performed by: PHARMACIST

## 2021-10-14 DIAGNOSIS — I50.9 HEART FAILURE, UNSPECIFIED HF CHRONICITY, UNSPECIFIED HEART FAILURE TYPE: Primary | ICD-10-CM

## 2021-10-18 ENCOUNTER — OFFICE VISIT (OUTPATIENT)
Dept: CARDIOLOGY | Facility: CLINIC | Age: 66
End: 2021-10-18
Payer: MEDICARE

## 2021-10-18 ENCOUNTER — CLINICAL SUPPORT (OUTPATIENT)
Dept: CARDIOLOGY | Facility: HOSPITAL | Age: 66
End: 2021-10-18
Attending: INTERNAL MEDICINE
Payer: MEDICARE

## 2021-10-18 ENCOUNTER — HOSPITAL ENCOUNTER (OUTPATIENT)
Dept: CARDIOLOGY | Facility: HOSPITAL | Age: 66
Discharge: HOME | End: 2021-10-18
Attending: INTERNAL MEDICINE
Payer: MEDICARE

## 2021-10-18 VITALS
HEIGHT: 73 IN | SYSTOLIC BLOOD PRESSURE: 100 MMHG | WEIGHT: 229.25 LBS | OXYGEN SATURATION: 97 % | HEART RATE: 73 BPM | BODY MASS INDEX: 30.38 KG/M2 | DIASTOLIC BLOOD PRESSURE: 60 MMHG

## 2021-10-18 DIAGNOSIS — I50.42 CHRONIC COMBINED SYSTOLIC AND DIASTOLIC CONGESTIVE HEART FAILURE, NYHA CLASS 2: ICD-10-CM

## 2021-10-18 DIAGNOSIS — E66.9 OBESITY (BMI 30.0-34.9): ICD-10-CM

## 2021-10-18 DIAGNOSIS — I50.9 HEART FAILURE, UNSPECIFIED HF CHRONICITY, UNSPECIFIED HEART FAILURE TYPE: Primary | ICD-10-CM

## 2021-10-18 DIAGNOSIS — I50.40 COMBINED SYSTOLIC AND DIASTOLIC CONGESTIVE HEART FAILURE, NYHA CLASS 2, UNSPECIFIED CONGESTIVE HEART FAILURE CHRONICITY: ICD-10-CM

## 2021-10-18 DIAGNOSIS — I50.9 HEART FAILURE, UNSPECIFIED HF CHRONICITY, UNSPECIFIED HEART FAILURE TYPE: ICD-10-CM

## 2021-10-18 DIAGNOSIS — I42.0 DILATED CARDIOMYOPATHY: Chronic | ICD-10-CM

## 2021-10-18 DIAGNOSIS — F17.201 TOBACCO ABUSE, IN REMISSION: Chronic | ICD-10-CM

## 2021-10-18 DIAGNOSIS — Z95.810 AICD (AUTOMATIC CARDIOVERTER/DEFIBRILLATOR) PRESENT: ICD-10-CM

## 2021-10-18 DIAGNOSIS — Z95.810 BIVENTRICULAR IMPLANTABLE CARDIOVERTER-DEFIBRILLATOR (ICD) IN SITU: ICD-10-CM

## 2021-10-18 DIAGNOSIS — Z79.01 CURRENT USE OF LONG TERM ANTICOAGULATION: ICD-10-CM

## 2021-10-18 DIAGNOSIS — I48.0 PAROXYSMAL ATRIAL FIBRILLATION: ICD-10-CM

## 2021-10-18 DIAGNOSIS — I44.7 LBBB (LEFT BUNDLE BRANCH BLOCK): ICD-10-CM

## 2021-10-18 DIAGNOSIS — I10 PRIMARY HYPERTENSION: Chronic | ICD-10-CM

## 2021-10-18 PROCEDURE — 4010F PR ACE/ARB THEARPY RXD/TAKEN: ICD-10-PCS | Mod: CPTII,S$GLB,, | Performed by: INTERNAL MEDICINE

## 2021-10-18 PROCEDURE — 99215 OFFICE O/P EST HI 40 MIN: CPT | Mod: S$GLB,,, | Performed by: INTERNAL MEDICINE

## 2021-10-18 PROCEDURE — 3008F PR BODY MASS INDEX (BMI) DOCUMENTED: ICD-10-PCS | Mod: CPTII,S$GLB,, | Performed by: INTERNAL MEDICINE

## 2021-10-18 PROCEDURE — 99999 PR PBB SHADOW E&M-EST. PATIENT-LVL III: ICD-10-PCS | Mod: PBBFAC,,, | Performed by: INTERNAL MEDICINE

## 2021-10-18 PROCEDURE — 4010F ACE/ARB THERAPY RXD/TAKEN: CPT | Mod: CPTII,S$GLB,, | Performed by: INTERNAL MEDICINE

## 2021-10-18 PROCEDURE — 1160F RVW MEDS BY RX/DR IN RCRD: CPT | Mod: CPTII,S$GLB,, | Performed by: INTERNAL MEDICINE

## 2021-10-18 PROCEDURE — 93284 PRGRMG EVAL IMPLANTABLE DFB: CPT | Mod: 26,,, | Performed by: INTERNAL MEDICINE

## 2021-10-18 PROCEDURE — 99999 PR PBB SHADOW E&M-EST. PATIENT-LVL I: CPT | Mod: PBBFAC,,,

## 2021-10-18 PROCEDURE — 1159F PR MEDICATION LIST DOCUMENTED IN MEDICAL RECORD: ICD-10-PCS | Mod: CPTII,S$GLB,, | Performed by: INTERNAL MEDICINE

## 2021-10-18 PROCEDURE — 99999 PR PBB SHADOW E&M-EST. PATIENT-LVL I: ICD-10-PCS | Mod: PBBFAC,,,

## 2021-10-18 PROCEDURE — 3078F DIAST BP <80 MM HG: CPT | Mod: CPTII,S$GLB,, | Performed by: INTERNAL MEDICINE

## 2021-10-18 PROCEDURE — 3074F SYST BP LT 130 MM HG: CPT | Mod: CPTII,S$GLB,, | Performed by: INTERNAL MEDICINE

## 2021-10-18 PROCEDURE — 3074F PR MOST RECENT SYSTOLIC BLOOD PRESSURE < 130 MM HG: ICD-10-PCS | Mod: CPTII,S$GLB,, | Performed by: INTERNAL MEDICINE

## 2021-10-18 PROCEDURE — 1159F MED LIST DOCD IN RCRD: CPT | Mod: CPTII,S$GLB,, | Performed by: INTERNAL MEDICINE

## 2021-10-18 PROCEDURE — 1160F PR REVIEW ALL MEDS BY PRESCRIBER/CLIN PHARMACIST DOCUMENTED: ICD-10-PCS | Mod: CPTII,S$GLB,, | Performed by: INTERNAL MEDICINE

## 2021-10-18 PROCEDURE — 93284 CARDIAC DEVICE CHECK - IN CLINIC & HOSPITAL: ICD-10-PCS | Mod: 26,,, | Performed by: INTERNAL MEDICINE

## 2021-10-18 PROCEDURE — 93010 EKG 12-LEAD: ICD-10-PCS | Mod: ,,, | Performed by: INTERNAL MEDICINE

## 2021-10-18 PROCEDURE — 93005 ELECTROCARDIOGRAM TRACING: CPT | Mod: 59

## 2021-10-18 PROCEDURE — 3078F PR MOST RECENT DIASTOLIC BLOOD PRESSURE < 80 MM HG: ICD-10-PCS | Mod: CPTII,S$GLB,, | Performed by: INTERNAL MEDICINE

## 2021-10-18 PROCEDURE — 99215 PR OFFICE/OUTPT VISIT, EST, LEVL V, 40-54 MIN: ICD-10-PCS | Mod: S$GLB,,, | Performed by: INTERNAL MEDICINE

## 2021-10-18 PROCEDURE — 93284 PRGRMG EVAL IMPLANTABLE DFB: CPT

## 2021-10-18 PROCEDURE — 93010 ELECTROCARDIOGRAM REPORT: CPT | Mod: ,,, | Performed by: INTERNAL MEDICINE

## 2021-10-18 PROCEDURE — 99999 PR PBB SHADOW E&M-EST. PATIENT-LVL III: CPT | Mod: PBBFAC,,, | Performed by: INTERNAL MEDICINE

## 2021-10-18 PROCEDURE — 3008F BODY MASS INDEX DOCD: CPT | Mod: CPTII,S$GLB,, | Performed by: INTERNAL MEDICINE

## 2021-10-25 ENCOUNTER — HOSPITAL ENCOUNTER (OUTPATIENT)
Dept: CARDIOLOGY | Facility: HOSPITAL | Age: 66
Discharge: HOME | End: 2021-10-25
Attending: INTERNAL MEDICINE
Payer: MEDICARE

## 2021-10-25 ENCOUNTER — TELEPHONE (OUTPATIENT)
Dept: CARDIOLOGY | Facility: CLINIC | Age: 66
End: 2021-10-25
Payer: MEDICARE

## 2021-10-25 VITALS
DIASTOLIC BLOOD PRESSURE: 60 MMHG | WEIGHT: 229 LBS | BODY MASS INDEX: 30.35 KG/M2 | SYSTOLIC BLOOD PRESSURE: 110 MMHG | HEART RATE: 65 BPM | HEIGHT: 73 IN

## 2021-10-25 DIAGNOSIS — I50.40 COMBINED SYSTOLIC AND DIASTOLIC CONGESTIVE HEART FAILURE, NYHA CLASS 2, UNSPECIFIED CONGESTIVE HEART FAILURE CHRONICITY: ICD-10-CM

## 2021-10-25 DIAGNOSIS — I50.9 HEART FAILURE, UNSPECIFIED HF CHRONICITY, UNSPECIFIED HEART FAILURE TYPE: ICD-10-CM

## 2021-10-25 DIAGNOSIS — I48.0 PAROXYSMAL ATRIAL FIBRILLATION: ICD-10-CM

## 2021-10-25 LAB
ASCENDING AORTA: 3.6 CM
AV INDEX (PROSTH): 0.65
AV MEAN GRADIENT: 5 MMHG
AV PEAK GRADIENT: 9 MMHG
AV VALVE AREA: 3.12 CM2
AV VELOCITY RATIO: 0.6
BSA FOR ECHO PROCEDURE: 2.31 M2
CV ECHO LV RWT: 0.38 CM
DOP CALC AO PEAK VEL: 1.49 M/S
DOP CALC AO VTI: 27.56 CM
DOP CALC LVOT AREA: 4.8 CM2
DOP CALC LVOT DIAMETER: 2.48 CM
DOP CALC LVOT PEAK VEL: 0.89 M/S
DOP CALC LVOT STROKE VOLUME: 85.89 CM3
DOP CALCLVOT PEAK VEL VTI: 17.79 CM
E WAVE DECELERATION TIME: 163.32 MSEC
E/A RATIO: 0.66
E/E' RATIO: 6.77 M/S
ECHO LV POSTERIOR WALL: 1.35 CM (ref 0.6–1.1)
EJECTION FRACTION: 40 %
FRACTIONAL SHORTENING: 21 % (ref 28–44)
INTERVENTRICULAR SEPTUM: 0.75 CM (ref 0.6–1.1)
IVRT: 111.32 MSEC
LA MAJOR: 6.45 CM
LA MINOR: 6.39 CM
LA WIDTH: 5.39 CM
LEFT ATRIUM SIZE: 5.07 CM
LEFT ATRIUM VOLUME INDEX MOD: 64.7 ML/M2
LEFT ATRIUM VOLUME INDEX: 65.4 ML/M2
LEFT ATRIUM VOLUME MOD: 147.57 CM3
LEFT ATRIUM VOLUME: 149.12 CM3
LEFT INTERNAL DIMENSION IN SYSTOLE: 5.6 CM (ref 2.1–4)
LEFT VENTRICLE DIASTOLIC VOLUME INDEX: 102.18 ML/M2
LEFT VENTRICLE DIASTOLIC VOLUME: 232.98 ML
LEFT VENTRICLE MASS INDEX: 154 G/M2
LEFT VENTRICLE SYSTOLIC VOLUME INDEX: 58.8 ML/M2
LEFT VENTRICLE SYSTOLIC VOLUME: 133.97 ML
LEFT VENTRICULAR INTERNAL DIMENSION IN DIASTOLE: 7.1 CM (ref 3.5–6)
LEFT VENTRICULAR MASS: 350.69 G
LV LATERAL E/E' RATIO: 6.29 M/S
LV SEPTAL E/E' RATIO: 7.33 M/S
MV A" WAVE DURATION": 15.98 MSEC
MV PEAK A VEL: 0.67 M/S
MV PEAK E VEL: 0.44 M/S
MV STENOSIS PRESSURE HALF TIME: 47.36 MS
MV VALVE AREA P 1/2 METHOD: 4.65 CM2
PISA TR MAX VEL: 2.6 M/S
PULM VEIN S/D RATIO: 1.13
PV PEAK D VEL: 0.45 M/S
PV PEAK S VEL: 0.51 M/S
RA MAJOR: 4.97 CM
RA PRESSURE: 8 MMHG
RA WIDTH: 4.71 CM
RIGHT VENTRICULAR END-DIASTOLIC DIMENSION: 3.54 CM
RV TISSUE DOPPLER FREE WALL SYSTOLIC VELOCITY 1 (APICAL 4 CHAMBER VIEW): 14.69 CM/S
SINUS: 3.7 CM
STJ: 3.66 CM
TDI LATERAL: 0.07 M/S
TDI SEPTAL: 0.06 M/S
TDI: 0.07 M/S
TR MAX PG: 27 MMHG
TRICUSPID ANNULAR PLANE SYSTOLIC EXCURSION: 2.13 CM
TV REST PULMONARY ARTERY PRESSURE: 35 MMHG

## 2021-10-25 PROCEDURE — 93306 ECHO (CUPID ONLY): ICD-10-PCS | Mod: 26,,, | Performed by: INTERNAL MEDICINE

## 2021-10-25 PROCEDURE — 93306 TTE W/DOPPLER COMPLETE: CPT

## 2021-10-25 PROCEDURE — 93306 TTE W/DOPPLER COMPLETE: CPT | Mod: 26,,, | Performed by: INTERNAL MEDICINE

## 2021-10-27 ENCOUNTER — ANTI-COAG VISIT (OUTPATIENT)
Dept: CARDIOLOGY | Facility: CLINIC | Age: 66
End: 2021-10-27
Payer: MEDICARE

## 2021-10-27 ENCOUNTER — LAB VISIT (OUTPATIENT)
Dept: LAB | Facility: HOSPITAL | Age: 66
End: 2021-10-27
Attending: INTERNAL MEDICINE
Payer: MEDICARE

## 2021-10-27 DIAGNOSIS — Z79.01 CURRENT USE OF LONG TERM ANTICOAGULATION: ICD-10-CM

## 2021-10-27 DIAGNOSIS — Z79.01 CURRENT USE OF LONG TERM ANTICOAGULATION: Primary | ICD-10-CM

## 2021-10-27 LAB
INR PPP: 2.8 (ref 0.8–1.2)
PROTHROMBIN TIME: 27.6 SEC (ref 9–12.5)

## 2021-10-27 PROCEDURE — 93793 PR ANTICOAGULANT MGMT FOR PT TAKING WARFARIN: ICD-10-PCS | Mod: S$GLB,,, | Performed by: PHARMACIST

## 2021-10-27 PROCEDURE — 93793 ANTICOAG MGMT PT WARFARIN: CPT | Mod: S$GLB,,, | Performed by: PHARMACIST

## 2021-10-27 PROCEDURE — 36415 COLL VENOUS BLD VENIPUNCTURE: CPT | Mod: PO | Performed by: INTERNAL MEDICINE

## 2021-10-27 PROCEDURE — 85610 PROTHROMBIN TIME: CPT | Performed by: INTERNAL MEDICINE

## 2021-11-02 PROBLEM — Z79.899 ON AMIODARONE THERAPY: Status: RESOLVED | Noted: 2017-06-19 | Resolved: 2021-11-02

## 2021-11-04 DIAGNOSIS — I50.9 HEART FAILURE, UNSPECIFIED HF CHRONICITY, UNSPECIFIED HEART FAILURE TYPE: ICD-10-CM

## 2021-11-04 DIAGNOSIS — Z79.01 CURRENT USE OF LONG TERM ANTICOAGULATION: ICD-10-CM

## 2021-11-04 DIAGNOSIS — I10 PRIMARY HYPERTENSION: Primary | ICD-10-CM

## 2021-11-04 RX ORDER — FUROSEMIDE 20 MG/1
20 TABLET ORAL DAILY
COMMUNITY
End: 2021-11-04 | Stop reason: SDUPTHER

## 2021-11-04 RX ORDER — FUROSEMIDE 20 MG/1
20 TABLET ORAL DAILY
Qty: 30 TABLET | Refills: 3 | Status: SHIPPED | OUTPATIENT
Start: 2021-11-04 | End: 2022-02-28

## 2021-11-12 NOTE — PROGRESS NOTES
05/22/2020 left message for pt to test inr, labs were due on 5/21/2020. [facility]   verbal cues/1 person assist

## 2021-11-18 ENCOUNTER — TELEPHONE (OUTPATIENT)
Dept: CARDIOLOGY | Facility: CLINIC | Age: 66
End: 2021-11-18
Payer: MEDICARE

## 2021-11-18 ENCOUNTER — LAB VISIT (OUTPATIENT)
Dept: LAB | Facility: HOSPITAL | Age: 66
End: 2021-11-18
Attending: INTERNAL MEDICINE
Payer: MEDICARE

## 2021-11-18 DIAGNOSIS — I50.9 HEART FAILURE, UNSPECIFIED HF CHRONICITY, UNSPECIFIED HEART FAILURE TYPE: ICD-10-CM

## 2021-11-18 DIAGNOSIS — I10 PRIMARY HYPERTENSION: ICD-10-CM

## 2021-11-18 LAB
ANION GAP SERPL CALC-SCNC: 7 MMOL/L (ref 8–16)
BNP SERPL-MCNC: 47 PG/ML (ref 0–99)
BUN SERPL-MCNC: 18 MG/DL (ref 8–23)
CALCIUM SERPL-MCNC: 9.3 MG/DL (ref 8.7–10.5)
CHLORIDE SERPL-SCNC: 103 MMOL/L (ref 95–110)
CO2 SERPL-SCNC: 27 MMOL/L (ref 23–29)
CREAT SERPL-MCNC: 1.3 MG/DL (ref 0.5–1.4)
EST. GFR  (AFRICAN AMERICAN): >60 ML/MIN/1.73 M^2
EST. GFR  (NON AFRICAN AMERICAN): 58.1 ML/MIN/1.73 M^2
GLUCOSE SERPL-MCNC: 116 MG/DL (ref 70–110)
POTASSIUM SERPL-SCNC: 4.7 MMOL/L (ref 3.5–5.1)
SODIUM SERPL-SCNC: 137 MMOL/L (ref 136–145)

## 2021-11-18 PROCEDURE — 83880 ASSAY OF NATRIURETIC PEPTIDE: CPT | Performed by: INTERNAL MEDICINE

## 2021-11-18 PROCEDURE — 36415 COLL VENOUS BLD VENIPUNCTURE: CPT | Performed by: INTERNAL MEDICINE

## 2021-11-18 PROCEDURE — 80048 BASIC METABOLIC PNL TOTAL CA: CPT | Performed by: INTERNAL MEDICINE

## 2021-12-07 ENCOUNTER — CLINICAL SUPPORT (OUTPATIENT)
Dept: CARDIOLOGY | Facility: HOSPITAL | Age: 66
End: 2021-12-07
Payer: MEDICARE

## 2021-12-07 DIAGNOSIS — Z95.810 PRESENCE OF AUTOMATIC (IMPLANTABLE) CARDIAC DEFIBRILLATOR: ICD-10-CM

## 2021-12-07 PROCEDURE — 93296 REM INTERROG EVL PM/IDS: CPT | Performed by: INTERNAL MEDICINE

## 2021-12-07 PROCEDURE — 93295 CARDIAC DEVICE CHECK - REMOTE: ICD-10-PCS | Mod: S$GLB,,, | Performed by: INTERNAL MEDICINE

## 2021-12-07 PROCEDURE — 93295 DEV INTERROG REMOTE 1/2/MLT: CPT | Mod: S$GLB,,, | Performed by: INTERNAL MEDICINE

## 2021-12-08 ENCOUNTER — LAB VISIT (OUTPATIENT)
Dept: LAB | Facility: HOSPITAL | Age: 66
End: 2021-12-08
Attending: INTERNAL MEDICINE
Payer: MEDICARE

## 2021-12-08 ENCOUNTER — ANTI-COAG VISIT (OUTPATIENT)
Dept: CARDIOLOGY | Facility: CLINIC | Age: 66
End: 2021-12-08
Payer: MEDICARE

## 2021-12-08 DIAGNOSIS — Z79.01 CURRENT USE OF LONG TERM ANTICOAGULATION: ICD-10-CM

## 2021-12-08 DIAGNOSIS — Z79.01 CURRENT USE OF LONG TERM ANTICOAGULATION: Primary | ICD-10-CM

## 2021-12-08 LAB
INR PPP: 2.5 (ref 0.8–1.2)
PROTHROMBIN TIME: 25 SEC (ref 9–12.5)

## 2021-12-08 PROCEDURE — 93793 PR ANTICOAGULANT MGMT FOR PT TAKING WARFARIN: ICD-10-PCS | Mod: S$GLB,,, | Performed by: PHARMACIST

## 2021-12-08 PROCEDURE — 36415 COLL VENOUS BLD VENIPUNCTURE: CPT | Mod: PO | Performed by: INTERNAL MEDICINE

## 2021-12-08 PROCEDURE — 93793 ANTICOAG MGMT PT WARFARIN: CPT | Mod: S$GLB,,, | Performed by: PHARMACIST

## 2021-12-08 PROCEDURE — 85610 PROTHROMBIN TIME: CPT | Performed by: INTERNAL MEDICINE

## 2022-01-19 ENCOUNTER — ANTI-COAG VISIT (OUTPATIENT)
Dept: CARDIOLOGY | Facility: CLINIC | Age: 67
End: 2022-01-19
Payer: MEDICARE

## 2022-01-19 ENCOUNTER — LAB VISIT (OUTPATIENT)
Dept: LAB | Facility: HOSPITAL | Age: 67
End: 2022-01-19
Attending: INTERNAL MEDICINE
Payer: MEDICARE

## 2022-01-19 DIAGNOSIS — Z79.01 CURRENT USE OF LONG TERM ANTICOAGULATION: Primary | ICD-10-CM

## 2022-01-19 DIAGNOSIS — Z79.01 CURRENT USE OF LONG TERM ANTICOAGULATION: ICD-10-CM

## 2022-01-19 LAB
INR PPP: 2.9 (ref 0.8–1.2)
PROTHROMBIN TIME: 29.8 SEC (ref 9–12.5)

## 2022-01-19 PROCEDURE — 36415 COLL VENOUS BLD VENIPUNCTURE: CPT | Mod: PO | Performed by: INTERNAL MEDICINE

## 2022-01-19 PROCEDURE — 85610 PROTHROMBIN TIME: CPT | Performed by: INTERNAL MEDICINE

## 2022-01-19 PROCEDURE — 93793 ANTICOAG MGMT PT WARFARIN: CPT | Mod: S$GLB,,, | Performed by: PHARMACIST

## 2022-01-19 PROCEDURE — 93793 PR ANTICOAGULANT MGMT FOR PT TAKING WARFARIN: ICD-10-PCS | Mod: S$GLB,,, | Performed by: PHARMACIST

## 2022-02-15 RX ORDER — CANDESARTAN 32 MG/1
32 TABLET ORAL DAILY
Qty: 90 TABLET | Refills: 0 | Status: SHIPPED | OUTPATIENT
Start: 2022-02-15 | End: 2022-05-12

## 2022-03-02 ENCOUNTER — ANTI-COAG VISIT (OUTPATIENT)
Dept: CARDIOLOGY | Facility: CLINIC | Age: 67
End: 2022-03-02
Payer: MEDICARE

## 2022-03-02 ENCOUNTER — LAB VISIT (OUTPATIENT)
Dept: LAB | Facility: HOSPITAL | Age: 67
End: 2022-03-02
Attending: INTERNAL MEDICINE
Payer: MEDICARE

## 2022-03-02 DIAGNOSIS — Z79.01 CURRENT USE OF LONG TERM ANTICOAGULATION: ICD-10-CM

## 2022-03-02 DIAGNOSIS — Z79.01 CURRENT USE OF LONG TERM ANTICOAGULATION: Primary | ICD-10-CM

## 2022-03-02 LAB
INR PPP: 2.9 (ref 0.8–1.2)
PROTHROMBIN TIME: 29.7 SEC (ref 9–12.5)

## 2022-03-02 PROCEDURE — 93793 ANTICOAG MGMT PT WARFARIN: CPT | Mod: S$GLB,,,

## 2022-03-02 PROCEDURE — 93793 PR ANTICOAGULANT MGMT FOR PT TAKING WARFARIN: ICD-10-PCS | Mod: S$GLB,,,

## 2022-03-02 PROCEDURE — 85610 PROTHROMBIN TIME: CPT | Performed by: INTERNAL MEDICINE

## 2022-03-02 PROCEDURE — 36415 COLL VENOUS BLD VENIPUNCTURE: CPT | Mod: PO | Performed by: INTERNAL MEDICINE

## 2022-03-07 ENCOUNTER — CLINICAL SUPPORT (OUTPATIENT)
Dept: CARDIOLOGY | Facility: HOSPITAL | Age: 67
End: 2022-03-07
Payer: MEDICARE

## 2022-03-07 DIAGNOSIS — Z95.810 PRESENCE OF AUTOMATIC (IMPLANTABLE) CARDIAC DEFIBRILLATOR: ICD-10-CM

## 2022-03-07 PROCEDURE — 93296 REM INTERROG EVL PM/IDS: CPT | Performed by: INTERNAL MEDICINE

## 2022-03-31 ENCOUNTER — TELEPHONE (OUTPATIENT)
Dept: CARDIOLOGY | Facility: HOSPITAL | Age: 67
End: 2022-03-31
Payer: MEDICARE

## 2022-04-22 ENCOUNTER — CLINICAL SUPPORT (OUTPATIENT)
Dept: CARDIOLOGY | Facility: HOSPITAL | Age: 67
End: 2022-04-22
Attending: INTERNAL MEDICINE
Payer: MEDICARE

## 2022-04-22 DIAGNOSIS — I48.0 PAROXYSMAL ATRIAL FIBRILLATION: ICD-10-CM

## 2022-04-22 DIAGNOSIS — Z95.810 AICD (AUTOMATIC CARDIOVERTER/DEFIBRILLATOR) PRESENT: ICD-10-CM

## 2022-04-22 DIAGNOSIS — Z95.810 AICD (AUTOMATIC CARDIOVERTER/DEFIBRILLATOR) PRESENT: Primary | ICD-10-CM

## 2022-04-22 DIAGNOSIS — L98.9 SKIN DISORDER: ICD-10-CM

## 2022-04-22 PROCEDURE — 99999 PR PBB SHADOW E&M-EST. PATIENT-LVL I: ICD-10-PCS | Mod: PBBFAC,,,

## 2022-04-22 PROCEDURE — 93284 PRGRMG EVAL IMPLANTABLE DFB: CPT

## 2022-04-22 PROCEDURE — 93284 CARDIAC DEVICE CHECK - IN CLINIC & HOSPITAL: ICD-10-PCS | Mod: 26,,, | Performed by: INTERNAL MEDICINE

## 2022-04-22 PROCEDURE — 93284 PRGRMG EVAL IMPLANTABLE DFB: CPT | Mod: 26,,, | Performed by: INTERNAL MEDICINE

## 2022-04-22 PROCEDURE — 99999 PR PBB SHADOW E&M-EST. PATIENT-LVL I: CPT | Mod: PBBFAC,,,

## 2022-04-26 DIAGNOSIS — I44.7 LBBB (LEFT BUNDLE BRANCH BLOCK): ICD-10-CM

## 2022-04-26 DIAGNOSIS — I42.0 DILATED CARDIOMYOPATHY: ICD-10-CM

## 2022-04-26 DIAGNOSIS — I48.0 PAROXYSMAL ATRIAL FIBRILLATION: ICD-10-CM

## 2022-04-26 DIAGNOSIS — I50.40 COMBINED SYSTOLIC AND DIASTOLIC CONGESTIVE HEART FAILURE, NYHA CLASS 2, UNSPECIFIED CONGESTIVE HEART FAILURE CHRONICITY: ICD-10-CM

## 2022-04-26 DIAGNOSIS — Z95.810 BIVENTRICULAR IMPLANTABLE CARDIOVERTER-DEFIBRILLATOR (ICD) IN SITU: Primary | ICD-10-CM

## 2022-04-28 ENCOUNTER — LAB VISIT (OUTPATIENT)
Dept: LAB | Facility: HOSPITAL | Age: 67
End: 2022-04-28
Attending: INTERNAL MEDICINE
Payer: MEDICARE

## 2022-04-28 ENCOUNTER — ANTI-COAG VISIT (OUTPATIENT)
Dept: CARDIOLOGY | Facility: CLINIC | Age: 67
End: 2022-04-28
Payer: MEDICARE

## 2022-04-28 DIAGNOSIS — Z79.01 CURRENT USE OF LONG TERM ANTICOAGULATION: ICD-10-CM

## 2022-04-28 DIAGNOSIS — Z79.01 CURRENT USE OF LONG TERM ANTICOAGULATION: Primary | ICD-10-CM

## 2022-04-28 LAB
INR PPP: 3.2 (ref 0.8–1.2)
PROTHROMBIN TIME: 32.1 SEC (ref 9–12.5)

## 2022-04-28 PROCEDURE — 36415 COLL VENOUS BLD VENIPUNCTURE: CPT | Mod: PO | Performed by: INTERNAL MEDICINE

## 2022-04-28 PROCEDURE — 85610 PROTHROMBIN TIME: CPT | Performed by: INTERNAL MEDICINE

## 2022-04-28 PROCEDURE — 93793 ANTICOAG MGMT PT WARFARIN: CPT | Mod: S$GLB,,, | Performed by: PHARMACIST

## 2022-04-28 PROCEDURE — 93793 PR ANTICOAGULANT MGMT FOR PT TAKING WARFARIN: ICD-10-PCS | Mod: S$GLB,,, | Performed by: PHARMACIST

## 2022-04-29 ENCOUNTER — HOSPITAL ENCOUNTER (OUTPATIENT)
Dept: PULMONOLOGY | Facility: CLINIC | Age: 67
Discharge: HOME | End: 2022-04-29
Payer: MEDICARE

## 2022-04-29 DIAGNOSIS — I48.0 PAROXYSMAL ATRIAL FIBRILLATION: ICD-10-CM

## 2022-04-29 DIAGNOSIS — Z79.01 CURRENT USE OF LONG TERM ANTICOAGULATION: ICD-10-CM

## 2022-04-29 DIAGNOSIS — L98.9 SKIN DISORDER: ICD-10-CM

## 2022-04-29 DIAGNOSIS — Z95.810 AICD (AUTOMATIC CARDIOVERTER/DEFIBRILLATOR) PRESENT: ICD-10-CM

## 2022-04-29 DIAGNOSIS — I48.91 ATRIAL FIBRILLATION: Primary | ICD-10-CM

## 2022-04-29 DIAGNOSIS — R06.02 SOB (SHORTNESS OF BREATH): ICD-10-CM

## 2022-04-29 LAB
DLCO SINGLE BREATH LLN: 23.12
DLCO SINGLE BREATH PRE REF: 64.8 %
DLCO SINGLE BREATH REF: 30.05
DLCOC SBVA LLN: 2.88
DLCOC SBVA REF: 3.99
DLCOC SINGLE BREATH LLN: 23.12
DLCOC SINGLE BREATH REF: 30.05
DLCOCSBVAULN: 5.1
DLCOCSINGLEBREATHULN: 36.98
DLCOSINGLEBREATHULN: 36.98
DLCOVA LLN: 2.88
DLCOVA PRE REF: 87.7 %
DLCOVA PRE: 3.5 ML/(MIN*MMHG*L) (ref 2.88–5.1)
DLCOVA REF: 3.99
DLCOVAULN: 5.1
FEF 25 75 LLN: 1.34
FEF 25 75 PRE REF: 57.7 %
FEF 25 75 REF: 2.87
FEV05 LLN: 1.8
FEV05 REF: 2.93
FEV1 FVC LLN: 64
FEV1 FVC PRE REF: 93.8 %
FEV1 FVC REF: 76
FEV1 LLN: 2.7
FEV1 PRE REF: 65.9 %
FEV1 REF: 3.64
FVC LLN: 3.61
FVC PRE REF: 70.1 %
FVC REF: 4.78
IVC PRE: 3.27 L (ref 3.61–5.98)
IVC SINGLE BREATH LLN: 3.61
IVC SINGLE BREATH PRE REF: 68.3 %
IVC SINGLE BREATH REF: 4.78
IVCSINGLEBREATHULN: 5.98
PEF LLN: 6.92
PEF PRE REF: 58.9 %
PEF REF: 9.37
PHYSICIAN COMMENT: ABNORMAL
PRE DLCO: 19.48 ML/(MIN*MMHG) (ref 23.12–36.98)
PRE FEF 25 75: 1.66 L/S (ref 1.34–4.98)
PRE FET 100: 6.05 SEC
PRE FEV05 REF: 63.5 %
PRE FEV1 FVC: 71.69 % (ref 63.92–87.45)
PRE FEV1: 2.4 L (ref 2.7–4.53)
PRE FEV5: 1.86 L (ref 1.8–4.07)
PRE FVC: 3.35 L (ref 3.61–5.98)
PRE PEF: 5.52 L/S (ref 6.92–11.82)
VA PRE: 5.57 L (ref 7.38–7.38)
VA SINGLE BREATH PRE REF: 75.4 %
VA SINGLE BREATH REF: 7.38

## 2022-04-29 PROCEDURE — 94010 BREATHING CAPACITY TEST: CPT | Mod: S$GLB,,, | Performed by: INTERNAL MEDICINE

## 2022-04-29 PROCEDURE — 94010 BREATHING CAPACITY TEST: ICD-10-PCS | Mod: S$GLB,,, | Performed by: INTERNAL MEDICINE

## 2022-04-29 PROCEDURE — 94729 PR C02/MEMBANE DIFFUSE CAPACITY: ICD-10-PCS | Mod: S$GLB,,, | Performed by: INTERNAL MEDICINE

## 2022-04-29 PROCEDURE — 94729 DIFFUSING CAPACITY: CPT | Mod: S$GLB,,, | Performed by: INTERNAL MEDICINE

## 2022-05-06 NOTE — PROGRESS NOTES
See comments below and call patient to discuss.   Please close encounter when done -- no need to route back to me.  Thanks  Only mild restriction   No need to Rx except with rec' aerobic exercise

## 2022-05-10 ENCOUNTER — TELEPHONE (OUTPATIENT)
Dept: CARDIOLOGY | Facility: CLINIC | Age: 67
End: 2022-05-10
Payer: MEDICARE

## 2022-05-10 NOTE — TELEPHONE ENCOUNTER
----- Message from Lex Cheng MD sent at 5/6/2022  5:47 PM CDT -----  See comments below and call patient to discuss.   Please close encounter when done -- no need to route back to me.  Thanks  Only mild restriction   No need to Rx except with rec' aerobic exercise

## 2022-05-12 ENCOUNTER — LAB VISIT (OUTPATIENT)
Dept: LAB | Facility: HOSPITAL | Age: 67
End: 2022-05-12
Attending: FAMILY MEDICINE
Payer: MEDICARE

## 2022-05-12 DIAGNOSIS — Z79.01 CURRENT USE OF LONG TERM ANTICOAGULATION: ICD-10-CM

## 2022-05-12 LAB
INR PPP: 3.3 (ref 0.8–1.2)
PROTHROMBIN TIME: 33.5 SEC (ref 9–12.5)

## 2022-05-12 PROCEDURE — 85610 PROTHROMBIN TIME: CPT | Performed by: INTERNAL MEDICINE

## 2022-05-12 PROCEDURE — 36415 COLL VENOUS BLD VENIPUNCTURE: CPT | Mod: PO | Performed by: INTERNAL MEDICINE

## 2022-05-13 ENCOUNTER — TELEPHONE (OUTPATIENT)
Dept: CARDIOLOGY | Facility: CLINIC | Age: 67
End: 2022-05-13
Payer: MEDICARE

## 2022-05-13 ENCOUNTER — ANTI-COAG VISIT (OUTPATIENT)
Dept: CARDIOLOGY | Facility: CLINIC | Age: 67
End: 2022-05-13
Payer: MEDICARE

## 2022-05-13 DIAGNOSIS — Z79.01 CURRENT USE OF LONG TERM ANTICOAGULATION: Primary | ICD-10-CM

## 2022-05-13 PROCEDURE — 93793 PR ANTICOAGULANT MGMT FOR PT TAKING WARFARIN: ICD-10-PCS | Mod: S$GLB,,, | Performed by: PHARMACIST

## 2022-05-13 PROCEDURE — 93793 ANTICOAG MGMT PT WARFARIN: CPT | Mod: S$GLB,,, | Performed by: PHARMACIST

## 2022-05-13 NOTE — TELEPHONE ENCOUNTER
Called and left a message for the pt pb----- Message from Elenita Lockett sent at 5/13/2022  2:07 PM CDT -----  Regarding: Returning  Missed Call  Type:  Patient Returning Call    Who Called:Patient returning missed call to  Ms Schilling  Who Left Message for Patient:  Does the patient know what this is regarding?  Would the patient rather a call back or a response via [facility]?call  Best Call Back Number:[phone]  Additional Information:

## 2022-05-13 NOTE — PROGRESS NOTES
Confirmed taking correct dose of warfarin.  Reports having Wine once a month but not within the last week  No other changes

## 2022-05-26 ENCOUNTER — ANTI-COAG VISIT (OUTPATIENT)
Dept: CARDIOLOGY | Facility: CLINIC | Age: 67
End: 2022-05-26
Payer: MEDICARE

## 2022-05-26 ENCOUNTER — LAB VISIT (OUTPATIENT)
Dept: LAB | Facility: HOSPITAL | Age: 67
End: 2022-05-26
Attending: FAMILY MEDICINE
Payer: MEDICARE

## 2022-05-26 DIAGNOSIS — Z79.01 CURRENT USE OF LONG TERM ANTICOAGULATION: ICD-10-CM

## 2022-05-26 DIAGNOSIS — Z79.01 CURRENT USE OF LONG TERM ANTICOAGULATION: Primary | ICD-10-CM

## 2022-05-26 LAB
INR PPP: 2.7 (ref 0.8–1.2)
PROTHROMBIN TIME: 27.4 SEC (ref 9–12.5)

## 2022-05-26 PROCEDURE — 93793 PR ANTICOAGULANT MGMT FOR PT TAKING WARFARIN: ICD-10-PCS | Mod: S$GLB,,,

## 2022-05-26 PROCEDURE — 93793 ANTICOAG MGMT PT WARFARIN: CPT | Mod: S$GLB,,,

## 2022-05-26 PROCEDURE — 36415 COLL VENOUS BLD VENIPUNCTURE: CPT | Mod: PO | Performed by: INTERNAL MEDICINE

## 2022-05-26 PROCEDURE — 85610 PROTHROMBIN TIME: CPT | Performed by: INTERNAL MEDICINE

## 2022-05-27 ENCOUNTER — OFFICE VISIT (OUTPATIENT)
Dept: DERMATOLOGY | Facility: CLINIC | Age: 67
End: 2022-05-27
Payer: MEDICARE

## 2022-05-27 DIAGNOSIS — I87.2 VENOUS INSUFFICIENCY: Primary | ICD-10-CM

## 2022-05-27 DIAGNOSIS — I48.0 PAROXYSMAL ATRIAL FIBRILLATION: ICD-10-CM

## 2022-05-27 DIAGNOSIS — L98.9 SKIN DISORDER: ICD-10-CM

## 2022-05-27 DIAGNOSIS — Z95.810 AICD (AUTOMATIC CARDIOVERTER/DEFIBRILLATOR) PRESENT: ICD-10-CM

## 2022-05-27 DIAGNOSIS — L57.0 ACTINIC KERATOSES: ICD-10-CM

## 2022-05-27 PROCEDURE — 4010F ACE/ARB THERAPY RXD/TAKEN: CPT | Mod: CPTII,S$GLB,, | Performed by: STUDENT IN AN ORGANIZED HEALTH CARE EDUCATION/TRAINING PROGRAM

## 2022-05-27 PROCEDURE — 17000 DESTRUCT PREMALG LESION: CPT | Mod: S$GLB,,, | Performed by: STUDENT IN AN ORGANIZED HEALTH CARE EDUCATION/TRAINING PROGRAM

## 2022-05-27 PROCEDURE — 17003 DESTRUCT PREMALG LES 2-14: CPT | Mod: S$GLB,,, | Performed by: STUDENT IN AN ORGANIZED HEALTH CARE EDUCATION/TRAINING PROGRAM

## 2022-05-27 PROCEDURE — 17000 PR DESTRUCTION(LASER SURGERY,CRYOSURGERY,CHEMOSURGERY),PREMALIGNANT LESIONS,FIRST LESION: ICD-10-PCS | Mod: S$GLB,,, | Performed by: STUDENT IN AN ORGANIZED HEALTH CARE EDUCATION/TRAINING PROGRAM

## 2022-05-27 PROCEDURE — 1160F RVW MEDS BY RX/DR IN RCRD: CPT | Mod: CPTII,S$GLB,, | Performed by: STUDENT IN AN ORGANIZED HEALTH CARE EDUCATION/TRAINING PROGRAM

## 2022-05-27 PROCEDURE — 1159F PR MEDICATION LIST DOCUMENTED IN MEDICAL RECORD: ICD-10-PCS | Mod: CPTII,S$GLB,, | Performed by: STUDENT IN AN ORGANIZED HEALTH CARE EDUCATION/TRAINING PROGRAM

## 2022-05-27 PROCEDURE — 1159F MED LIST DOCD IN RCRD: CPT | Mod: CPTII,S$GLB,, | Performed by: STUDENT IN AN ORGANIZED HEALTH CARE EDUCATION/TRAINING PROGRAM

## 2022-05-27 PROCEDURE — 17003 DESTRUCTION, PREMALIGNANT LESIONS; SECOND THROUGH 14 LESIONS: ICD-10-PCS | Mod: S$GLB,,, | Performed by: STUDENT IN AN ORGANIZED HEALTH CARE EDUCATION/TRAINING PROGRAM

## 2022-05-27 PROCEDURE — 99999 PR PBB SHADOW E&M-EST. PATIENT-LVL III: ICD-10-PCS | Mod: PBBFAC,,, | Performed by: STUDENT IN AN ORGANIZED HEALTH CARE EDUCATION/TRAINING PROGRAM

## 2022-05-27 PROCEDURE — 99203 PR OFFICE/OUTPT VISIT, NEW, LEVL III, 30-44 MIN: ICD-10-PCS | Mod: 25,S$GLB,, | Performed by: STUDENT IN AN ORGANIZED HEALTH CARE EDUCATION/TRAINING PROGRAM

## 2022-05-27 PROCEDURE — 1160F PR REVIEW ALL MEDS BY PRESCRIBER/CLIN PHARMACIST DOCUMENTED: ICD-10-PCS | Mod: CPTII,S$GLB,, | Performed by: STUDENT IN AN ORGANIZED HEALTH CARE EDUCATION/TRAINING PROGRAM

## 2022-05-27 PROCEDURE — 4010F PR ACE/ARB THEARPY RXD/TAKEN: ICD-10-PCS | Mod: CPTII,S$GLB,, | Performed by: STUDENT IN AN ORGANIZED HEALTH CARE EDUCATION/TRAINING PROGRAM

## 2022-05-27 PROCEDURE — 99999 PR PBB SHADOW E&M-EST. PATIENT-LVL III: CPT | Mod: PBBFAC,,, | Performed by: STUDENT IN AN ORGANIZED HEALTH CARE EDUCATION/TRAINING PROGRAM

## 2022-05-27 PROCEDURE — 99203 OFFICE O/P NEW LOW 30 MIN: CPT | Mod: 25,S$GLB,, | Performed by: STUDENT IN AN ORGANIZED HEALTH CARE EDUCATION/TRAINING PROGRAM

## 2022-05-27 RX ORDER — UREA 40 %
CREAM (GRAM) TOPICAL DAILY
Qty: 198 G | Refills: 1 | Status: SHIPPED | OUTPATIENT
Start: 2022-05-27 | End: 2024-01-17

## 2022-05-27 NOTE — PROGRESS NOTES
Subjective:       Patient ID:  Shae Ramesh III is a 66 y.o. male who presents for   Chief Complaint   Patient presents with    Dry Skin     History of Present Illness: The patient presents with chief complaint of skin discoloration and dryness.  Location: both lower legs  Duration: progressing over several months to year  Signs/Symptoms: browish discoloration with roughness to the skin. Denies any itching or other symptoms. Does have circulation issues and deals with edema in the legs.     Prior treatments: none        Review of Systems   Constitutional: Negative for fever and chills.        Objective:    Physical Exam   Constitutional: He appears well-developed and well-nourished. No distress.   Neurological: He is alert and oriented to person, place, and time. He is not disoriented.   Psychiatric: He has a normal mood and affect.   Skin:   Areas Examined (abnormalities noted in diagram):   Head / Face Inspection Performed  Neck Inspection Performed  RUE Inspected  LUE Inspection Performed  RLE Inspected  LLE Inspection Performed              Diagram Legend     Erythematous scaling macule/papule c/w actinic keratosis       Vascular papule c/w angioma      Pigmented verrucoid papule/plaque c/w seborrheic keratosis      Yellow umbilicated papule c/w sebaceous hyperplasia      Irregularly shaped tan macule c/w lentigo     1-2 mm smooth white papules consistent with Milia      Movable subcutaneous cyst with punctum c/w epidermal inclusion cyst      Subcutaneous movable cyst c/w pilar cyst      Firm pink to brown papule c/w dermatofibroma      Pedunculated fleshy papule(s) c/w skin tag(s)      Evenly pigmented macule c/w junctional nevus     Mildly variegated pigmented, slightly irregular-bordered macule c/w mildly atypical nevus      Flesh colored to evenly pigmented papule c/w intradermal nevus       Pink pearly papule/plaque c/w basal cell carcinoma      Erythematous hyperkeratotic cursted plaque c/w SCC       Surgical scar with no sign of skin cancer recurrence      Open and closed comedones      Inflammatory papules and pustules      Verrucoid papule consistent consistent with wart     Erythematous eczematous patches and plaques     Dystrophic onycholytic nail with subungual debris c/w onychomycosis     Umbilicated papule    Erythematous-base heme-crusted tan verrucoid plaque consistent with inflamed seborrheic keratosis     Erythematous Silvery Scaling Plaque c/w Psoriasis     See annotation      Assessment / Plan:        Venous insufficiency  -     urea (CARMOL) 40 % Crea; Apply topically once daily.  Dispense: 198 g; Refill: 1  -     Recommend leg elevations     Actinic keratoses  Cryosurgery Procedure Note    Verbal consent from the patient is obtained including, but not limited to, risk of hypopigmentation/hyperpigmentation, scar, recurrence of lesion. The patient is aware of the precancerous quality and need for treatment of these lesions. Liquid nitrogen cryosurgery is applied to the 3 actinic keratoses, as detailed in the physical exam, to produce a freeze injury. The patient is aware that blisters may form and is instructed on wound care with gentle cleansing and use of vaseline ointment to keep moist until healed. The patient is supplied a handout on cryosurgery and is instructed to call if lesions do not completely resolve.             Follow up in about 6 months (around 11/27/2022).

## 2022-05-27 NOTE — PATIENT INSTRUCTIONS

## 2022-06-05 ENCOUNTER — CLINICAL SUPPORT (OUTPATIENT)
Dept: CARDIOLOGY | Facility: HOSPITAL | Age: 67
End: 2022-06-05
Payer: MEDICARE

## 2022-06-05 DIAGNOSIS — Z95.810 PRESENCE OF AUTOMATIC (IMPLANTABLE) CARDIAC DEFIBRILLATOR: ICD-10-CM

## 2022-06-05 PROCEDURE — 93295 CARDIAC DEVICE CHECK - REMOTE: ICD-10-PCS | Mod: S$GLB,,, | Performed by: INTERNAL MEDICINE

## 2022-06-05 PROCEDURE — 93296 REM INTERROG EVL PM/IDS: CPT | Performed by: INTERNAL MEDICINE

## 2022-06-05 PROCEDURE — 93295 DEV INTERROG REMOTE 1/2/MLT: CPT | Mod: S$GLB,,, | Performed by: INTERNAL MEDICINE

## 2022-06-09 ENCOUNTER — ANTI-COAG VISIT (OUTPATIENT)
Dept: CARDIOLOGY | Facility: CLINIC | Age: 67
End: 2022-06-09
Payer: MEDICARE

## 2022-06-09 ENCOUNTER — LAB VISIT (OUTPATIENT)
Dept: LAB | Facility: HOSPITAL | Age: 67
End: 2022-06-09
Attending: INTERNAL MEDICINE
Payer: MEDICARE

## 2022-06-09 DIAGNOSIS — Z79.01 CURRENT USE OF LONG TERM ANTICOAGULATION: ICD-10-CM

## 2022-06-09 DIAGNOSIS — Z79.01 CURRENT USE OF LONG TERM ANTICOAGULATION: Primary | ICD-10-CM

## 2022-06-09 LAB
INR PPP: 2.7 (ref 0.8–1.2)
PROTHROMBIN TIME: 27.1 SEC (ref 9–12.5)

## 2022-06-09 PROCEDURE — 85610 PROTHROMBIN TIME: CPT | Performed by: INTERNAL MEDICINE

## 2022-06-09 PROCEDURE — 93793 PR ANTICOAGULANT MGMT FOR PT TAKING WARFARIN: ICD-10-PCS | Mod: S$GLB,,, | Performed by: PHARMACIST

## 2022-06-09 PROCEDURE — 93793 ANTICOAG MGMT PT WARFARIN: CPT | Mod: S$GLB,,, | Performed by: PHARMACIST

## 2022-06-09 PROCEDURE — 36415 COLL VENOUS BLD VENIPUNCTURE: CPT | Mod: PO | Performed by: INTERNAL MEDICINE

## 2022-06-16 RX ORDER — CARVEDILOL 12.5 MG/1
12.5 TABLET ORAL 2 TIMES DAILY
Qty: 180 TABLET | Refills: 9 | Status: SHIPPED | OUTPATIENT
Start: 2022-06-16 | End: 2023-08-29 | Stop reason: SDUPTHER

## 2022-06-17 DIAGNOSIS — I50.22 CHF (CONGESTIVE HEART FAILURE), NYHA CLASS III, CHRONIC, SYSTOLIC: ICD-10-CM

## 2022-06-17 RX ORDER — SPIRONOLACTONE 25 MG/1
25 TABLET ORAL DAILY
Qty: 90 TABLET | Refills: 3 | Status: SHIPPED | OUTPATIENT
Start: 2022-06-17 | End: 2023-05-26 | Stop reason: SDUPTHER

## 2022-07-07 ENCOUNTER — LAB VISIT (OUTPATIENT)
Dept: LAB | Facility: HOSPITAL | Age: 67
End: 2022-07-07
Attending: FAMILY MEDICINE
Payer: MEDICARE

## 2022-07-07 DIAGNOSIS — Z79.01 CURRENT USE OF LONG TERM ANTICOAGULATION: ICD-10-CM

## 2022-07-07 LAB
INR PPP: 2.9 (ref 0.8–1.2)
PROTHROMBIN TIME: 29 SEC (ref 9–12.5)

## 2022-07-07 PROCEDURE — 85610 PROTHROMBIN TIME: CPT | Performed by: INTERNAL MEDICINE

## 2022-07-07 PROCEDURE — 36415 COLL VENOUS BLD VENIPUNCTURE: CPT | Mod: PO | Performed by: INTERNAL MEDICINE

## 2022-07-08 ENCOUNTER — ANTI-COAG VISIT (OUTPATIENT)
Dept: CARDIOLOGY | Facility: CLINIC | Age: 67
End: 2022-07-08
Payer: MEDICARE

## 2022-07-08 DIAGNOSIS — Z79.01 CURRENT USE OF LONG TERM ANTICOAGULATION: Primary | ICD-10-CM

## 2022-07-08 PROCEDURE — 93793 PR ANTICOAGULANT MGMT FOR PT TAKING WARFARIN: ICD-10-PCS | Mod: S$GLB,,,

## 2022-07-08 PROCEDURE — 93793 ANTICOAG MGMT PT WARFARIN: CPT | Mod: S$GLB,,,

## 2022-07-15 ENCOUNTER — TELEPHONE (OUTPATIENT)
Dept: CARDIOLOGY | Facility: CLINIC | Age: 67
End: 2022-07-15
Payer: MEDICARE

## 2022-07-15 NOTE — TELEPHONE ENCOUNTER
Patient contacted and requested to speak with the device clinic. The patient stated he thought he had some heart episodes. The patient was connected to the device clinic number to speak to Yifan.

## 2022-08-18 ENCOUNTER — ANTI-COAG VISIT (OUTPATIENT)
Dept: CARDIOLOGY | Facility: CLINIC | Age: 67
End: 2022-08-18
Payer: MEDICARE

## 2022-08-18 ENCOUNTER — LAB VISIT (OUTPATIENT)
Dept: LAB | Facility: HOSPITAL | Age: 67
End: 2022-08-18
Attending: INTERNAL MEDICINE
Payer: MEDICARE

## 2022-08-18 DIAGNOSIS — Z79.01 CURRENT USE OF LONG TERM ANTICOAGULATION: Primary | ICD-10-CM

## 2022-08-18 DIAGNOSIS — Z79.01 CURRENT USE OF LONG TERM ANTICOAGULATION: ICD-10-CM

## 2022-08-18 LAB
INR PPP: 3 (ref 0.8–1.2)
PROTHROMBIN TIME: 30.3 SEC (ref 9–12.5)

## 2022-08-18 PROCEDURE — 85610 PROTHROMBIN TIME: CPT | Performed by: INTERNAL MEDICINE

## 2022-08-18 PROCEDURE — 93793 ANTICOAG MGMT PT WARFARIN: CPT | Mod: S$GLB,,, | Performed by: PHARMACIST

## 2022-08-18 PROCEDURE — 36415 COLL VENOUS BLD VENIPUNCTURE: CPT | Mod: PO | Performed by: INTERNAL MEDICINE

## 2022-08-18 PROCEDURE — 93793 PR ANTICOAGULANT MGMT FOR PT TAKING WARFARIN: ICD-10-PCS | Mod: S$GLB,,, | Performed by: PHARMACIST

## 2022-09-03 ENCOUNTER — CLINICAL SUPPORT (OUTPATIENT)
Dept: CARDIOLOGY | Facility: HOSPITAL | Age: 67
End: 2022-09-03
Payer: MEDICARE

## 2022-09-03 DIAGNOSIS — Z95.810 PRESENCE OF AUTOMATIC (IMPLANTABLE) CARDIAC DEFIBRILLATOR: ICD-10-CM

## 2022-09-03 PROCEDURE — 93296 REM INTERROG EVL PM/IDS: CPT | Performed by: INTERNAL MEDICINE

## 2022-09-26 DIAGNOSIS — Z79.01 CURRENT USE OF LONG TERM ANTICOAGULATION: ICD-10-CM

## 2022-09-26 RX ORDER — WARFARIN SODIUM 5 MG/1
TABLET ORAL
Qty: 45 TABLET | Refills: 5 | Status: SHIPPED | OUTPATIENT
Start: 2022-09-26 | End: 2022-09-29 | Stop reason: SDUPTHER

## 2022-09-28 DIAGNOSIS — I48.0 PAROXYSMAL ATRIAL FIBRILLATION: Primary | ICD-10-CM

## 2022-09-29 DIAGNOSIS — Z79.01 CURRENT USE OF LONG TERM ANTICOAGULATION: ICD-10-CM

## 2022-09-29 RX ORDER — WARFARIN SODIUM 5 MG/1
TABLET ORAL
Qty: 45 TABLET | Refills: 5 | Status: SHIPPED | OUTPATIENT
Start: 2022-09-29 | End: 2022-10-03 | Stop reason: ALTCHOICE

## 2022-09-30 NOTE — TELEPHONE ENCOUNTER
This pt is going to be holding his coumadin 5 days prior to procedure.       Criselda I am not sure who monitors his coumadin  can you please forward to them. Thanks pb

## 2022-09-30 NOTE — PROGRESS NOTES
Patient reports he is due for colonoscopy. No date is set yet per patient.  He will need to hold x 5 days prior.  He will let us know once date is set.

## 2022-10-03 ENCOUNTER — HOSPITAL ENCOUNTER (OUTPATIENT)
Dept: CARDIOLOGY | Facility: HOSPITAL | Age: 67
Discharge: HOME | End: 2022-10-03
Attending: INTERNAL MEDICINE
Payer: MEDICARE

## 2022-10-03 ENCOUNTER — OFFICE VISIT (OUTPATIENT)
Dept: CARDIOLOGY | Facility: CLINIC | Age: 67
End: 2022-10-03
Payer: MEDICARE

## 2022-10-03 ENCOUNTER — TELEPHONE (OUTPATIENT)
Dept: CARDIOLOGY | Facility: CLINIC | Age: 67
End: 2022-10-03

## 2022-10-03 VITALS
HEART RATE: 74 BPM | DIASTOLIC BLOOD PRESSURE: 68 MMHG | SYSTOLIC BLOOD PRESSURE: 110 MMHG | BODY MASS INDEX: 30.97 KG/M2 | OXYGEN SATURATION: 97 % | HEIGHT: 73 IN | WEIGHT: 233.69 LBS

## 2022-10-03 DIAGNOSIS — I48.0 PAROXYSMAL ATRIAL FIBRILLATION: ICD-10-CM

## 2022-10-03 DIAGNOSIS — I50.42 CHRONIC COMBINED SYSTOLIC AND DIASTOLIC CONGESTIVE HEART FAILURE, NYHA CLASS 2: ICD-10-CM

## 2022-10-03 DIAGNOSIS — I48.0 PAROXYSMAL ATRIAL FIBRILLATION: Primary | ICD-10-CM

## 2022-10-03 DIAGNOSIS — I44.7 LBBB (LEFT BUNDLE BRANCH BLOCK): ICD-10-CM

## 2022-10-03 DIAGNOSIS — E66.9 OBESITY (BMI 30.0-34.9): ICD-10-CM

## 2022-10-03 DIAGNOSIS — I34.0 SEVERE MITRAL REGURGITATION: ICD-10-CM

## 2022-10-03 DIAGNOSIS — E87.6 HYPOKALEMIA: ICD-10-CM

## 2022-10-03 DIAGNOSIS — Z95.810 BIVENTRICULAR IMPLANTABLE CARDIOVERTER-DEFIBRILLATOR (ICD) IN SITU: ICD-10-CM

## 2022-10-03 DIAGNOSIS — Z79.01 CURRENT USE OF LONG TERM ANTICOAGULATION: ICD-10-CM

## 2022-10-03 DIAGNOSIS — I42.0 DCM (DILATED CARDIOMYOPATHY): ICD-10-CM

## 2022-10-03 DIAGNOSIS — N17.9 ACUTE KIDNEY INJURY: ICD-10-CM

## 2022-10-03 PROCEDURE — 3008F PR BODY MASS INDEX (BMI) DOCUMENTED: ICD-10-PCS | Mod: CPTII,S$GLB,, | Performed by: INTERNAL MEDICINE

## 2022-10-03 PROCEDURE — 3078F PR MOST RECENT DIASTOLIC BLOOD PRESSURE < 80 MM HG: ICD-10-PCS | Mod: CPTII,S$GLB,, | Performed by: INTERNAL MEDICINE

## 2022-10-03 PROCEDURE — 3074F PR MOST RECENT SYSTOLIC BLOOD PRESSURE < 130 MM HG: ICD-10-PCS | Mod: CPTII,S$GLB,, | Performed by: INTERNAL MEDICINE

## 2022-10-03 PROCEDURE — 1159F PR MEDICATION LIST DOCUMENTED IN MEDICAL RECORD: ICD-10-PCS | Mod: CPTII,S$GLB,, | Performed by: INTERNAL MEDICINE

## 2022-10-03 PROCEDURE — 1160F PR REVIEW ALL MEDS BY PRESCRIBER/CLIN PHARMACIST DOCUMENTED: ICD-10-PCS | Mod: CPTII,S$GLB,, | Performed by: INTERNAL MEDICINE

## 2022-10-03 PROCEDURE — 93010 ELECTROCARDIOGRAM REPORT: CPT | Mod: ,,, | Performed by: INTERNAL MEDICINE

## 2022-10-03 PROCEDURE — 99215 OFFICE O/P EST HI 40 MIN: CPT | Mod: S$GLB,,, | Performed by: INTERNAL MEDICINE

## 2022-10-03 PROCEDURE — 99999 PR PBB SHADOW E&M-EST. PATIENT-LVL III: ICD-10-PCS | Mod: PBBFAC,,, | Performed by: INTERNAL MEDICINE

## 2022-10-03 PROCEDURE — 3078F DIAST BP <80 MM HG: CPT | Mod: CPTII,S$GLB,, | Performed by: INTERNAL MEDICINE

## 2022-10-03 PROCEDURE — 99215 PR OFFICE/OUTPT VISIT, EST, LEVL V, 40-54 MIN: ICD-10-PCS | Mod: S$GLB,,, | Performed by: INTERNAL MEDICINE

## 2022-10-03 PROCEDURE — 1159F MED LIST DOCD IN RCRD: CPT | Mod: CPTII,S$GLB,, | Performed by: INTERNAL MEDICINE

## 2022-10-03 PROCEDURE — 4010F PR ACE/ARB THEARPY RXD/TAKEN: ICD-10-PCS | Mod: CPTII,S$GLB,, | Performed by: INTERNAL MEDICINE

## 2022-10-03 PROCEDURE — 4010F ACE/ARB THERAPY RXD/TAKEN: CPT | Mod: CPTII,S$GLB,, | Performed by: INTERNAL MEDICINE

## 2022-10-03 PROCEDURE — 99999 PR PBB SHADOW E&M-EST. PATIENT-LVL III: CPT | Mod: PBBFAC,,, | Performed by: INTERNAL MEDICINE

## 2022-10-03 PROCEDURE — 1160F RVW MEDS BY RX/DR IN RCRD: CPT | Mod: CPTII,S$GLB,, | Performed by: INTERNAL MEDICINE

## 2022-10-03 PROCEDURE — 93005 ELECTROCARDIOGRAM TRACING: CPT

## 2022-10-03 PROCEDURE — 3008F BODY MASS INDEX DOCD: CPT | Mod: CPTII,S$GLB,, | Performed by: INTERNAL MEDICINE

## 2022-10-03 PROCEDURE — 93010 EKG 12-LEAD: ICD-10-PCS | Mod: ,,, | Performed by: INTERNAL MEDICINE

## 2022-10-03 PROCEDURE — 3074F SYST BP LT 130 MM HG: CPT | Mod: CPTII,S$GLB,, | Performed by: INTERNAL MEDICINE

## 2022-10-03 NOTE — PROGRESS NOTES
Subjective:   Patient ID:  Shae Ramesh III is a 66 y.o. male     Chief complaint: chf    HPI  Background (see note by N May dated 4/12/21):  Shae Ramesh III is a 65 year old male  DCM, Chronic systolic CHF, S/P CRT-D([facility]), HTN, HLP, Metabolic syndrome, AFIB on Coumadin, morbid obesity. He has had excess stress due to his wife's medical conditions recently due to CVA.      Device check completed today in office, noted AFL episodes noted and reviewed with Dr Cheng. Plans for BNP today to assess volume status.      He does endorse that the last few days, he has had increased in SLOAN episodes.      Denies chest pain or anginal equivalents. Denies orthopnea, PND or abdominal bloating. Reports regular walking without any issues lately. NO leg swelling or claudications. No recent falls, syncope or near syncopal events. Reports compliance with medications and dietary restrictions. NO CNS complaints to suggest TIA or CVA today. No signs of abnormal bleeding on Coumadin.     Most recent INR 2.8, followed by [facility].      Update 10/18/2021:  ICD eval today - all Ok - occ PAF -   He has been feeling well in general   He has bee now off amiodarone  I have reviewed the actual image of the ECG tracing obtained today and it shows PBiV pacing with a favorable QRS morphology /duration (QRSd no more than 115 Msec in any one lead).       Update 10/3/2022:  CBC,CMP,NT ProBNP, TSH, Echo were all updated   Labs were all WNL , EF 40%.   He has been doing well - except for the time when he ran out of Coreg and didn't realize it - all went back to normal after he restarted it.     I have reviewed the actual image of the ECG tracing obtained today and it shows PBiV pacing with a favorable QRS morphology /duration (QRSd no more than  120  Msec in any one lead).a An OT/LBB/septalPVC is noted.   He is now out of coumadin for a few days.     Recent ICD eval - all good     Current Outpatient Medications   Medication Sig     allopurinol (ZYLOPRIM) 100 MG tablet 100 mg once daily.     alprazolam (XANAX XR) 0.5 MG 24 hr tablet Take 0.5 mg by mouth as needed.     candesartan (ATACAND) 32 MG tablet TAKE 1 TABLET(32 MG) BY MOUTH EVERY DAY    carvediloL (COREG) 12.5 MG tablet Take 1 tablet (12.5 mg total) by mouth 2 (two) times a day.    colchicine 0.6 mg tablet take 1 tablet by mouth twice a day if needed for gout pain    furosemide (LASIX) 20 MG tablet TAKE 1 TABLET(20 MG) BY MOUTH EVERY DAY    spironolactone (ALDACTONE) 25 MG tablet Take 1 tablet (25 mg total) by mouth once daily.    urea (CARMOL) 40 % Crea Apply topically once daily.    apixaban (ELIQUIS) 5 mg Tab Take 1 tablet (5 mg total) by mouth 2 (two) times daily.     No current facility-administered medications for this visit.     Review of Systems   Constitutional: Negative for decreased appetite, malaise/fatigue, weight gain and weight loss.   Eyes:  Negative for blurred vision.   Cardiovascular:  Negative for chest pain, claudication, cyanosis, dyspnea on exertion, irregular heartbeat, leg swelling, near-syncope, orthopnea and palpitations.   Respiratory:  Negative for cough, shortness of breath, sleep disturbances due to breathing, snoring and wheezing.    Endocrine: Negative for heat intolerance.   Hematologic/Lymphatic: Does not bruise/bleed easily.   Musculoskeletal:  Negative for muscle weakness and myalgias.   Gastrointestinal:  Negative for melena, nausea and vomiting.   Genitourinary:  Negative for nocturia.   Neurological:  Negative for excessive daytime sleepiness, dizziness, headaches, light-headedness and weakness.   Psychiatric/Behavioral:  Negative for depression, memory loss and substance abuse. The patient does not have insomnia and is not nervous/anxious.    Social History     Tobacco Use   Smoking Status Former    Packs/day: 1.00    Years: 20.00    Pack years: 20.00    Types: Cigarettes    Quit date: 1/10/2012    Years since quitting: 10.7   Smokeless Tobacco  Never        Objective:     Physical Exam  Vitals and nursing note reviewed.   Constitutional:       Appearance: Normal appearance. He is well-developed.   HENT:      Head: Normocephalic and atraumatic.      Right Ear: External ear normal.      Left Ear: External ear normal.   Eyes:      Conjunctiva/sclera: Conjunctivae normal.      Left eye: Left conjunctiva is not injected. No hemorrhage.     Pupils: Pupils are equal, round, and reactive to light.   Neck:      Thyroid: No thyromegaly.      Vascular: No JVD.   Cardiovascular:      Rate and Rhythm: Normal rate and regular rhythm.      Chest Wall: PMI is not displaced.      Pulses: Intact distal pulses.           Carotid pulses are 2+ on the right side and 2+ on the left side.       Radial pulses are 2+ on the right side and 2+ on the left side.        Dorsalis pedis pulses are 2+ on the right side and 2+ on the left side.        Posterior tibial pulses are 2+ on the right side and 2+ on the left side.      Heart sounds: Normal heart sounds. No midsystolic click and no opening snap. No murmur heard.    No friction rub. No gallop.   Pulmonary:      Effort: Pulmonary effort is normal. No respiratory distress.      Breath sounds: Normal breath sounds. No wheezing or rales.   Chest:      Chest wall: No tenderness.      Comments: Device pocket is in excellent repair.  Abdominal:      Palpations: Abdomen is soft. Abdomen is not rigid. There is no hepatomegaly.      Tenderness: There is no abdominal tenderness.   Musculoskeletal:         General: No tenderness. Normal range of motion.      Cervical back: Neck supple.      Right knee: No swelling.      Left knee: No swelling.      Right lower leg: No swelling.      Left lower leg: No swelling.      Right ankle: No swelling.      Left ankle: No swelling.      Right foot: No swelling.      Left foot: No swelling.   Skin:     General: Skin is warm and dry.      Findings: No rash.      Comments: Coumadin related bruises over  "both backs of hands    Neurological:      Mental Status: He is alert and oriented to person, place, and time.      Cranial Nerves: No cranial nerve deficit.      Coordination: Coordination normal.      Deep Tendon Reflexes: Reflexes are normal and symmetric.   Psychiatric:         Behavior: Behavior normal.     /68   Pulse 74   Ht 6' 1" (1.854 m)   Wt 106 kg (233 lb 11 oz)   SpO2 97%   BMI 30.83 kg/m²      reports current alcohol use.  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    Hypertension     LBBB (left bundle branch block) 6/12/2013    Obesity     Valvular regurgitation      Past Surgical History:   Procedure Laterality Date    ADENOIDECTOMY      APPENDECTOMY      CARDIAC DEFIBRILLATOR PLACEMENT      REPLACEMENT OF IMPLANTABLE CARDIOVERTER-DEFIBRILLATOR (ICD) GENERATOR Left 6/24/2020    Procedure: REPLACEMENT, PULSE GENERATOR, ICD;  Surgeon: Lex Cheng MD;  Location: [facility] C[facility] LAB;  Service: Cardiology;  Laterality: Left;  original device IAN  to [facility] CRT-D    TONSILLECTOMY       Family History   Problem Relation Age of Onset    Hypertension Father     Diabetes Maternal Grandmother     Diabetes Maternal Grandfather     Heart disease Paternal Grandmother     No Known Problems Mother     No Known Problems Brother     No Known Problems Paternal Grandfather     Cancer Sister     Early death Sister     Diabetes Sister     Diabetes Maternal Aunt     No Known Problems Maternal Uncle     No Known Problems Paternal Aunt     No Known Problems Paternal Uncle     Anemia Neg Hx     Arrhythmia Neg Hx     Asthma Neg Hx     Clotting disorder Neg Hx     Fainting Neg Hx     Heart attack Neg Hx     Heart failure Neg Hx     Hyperlipidemia Neg Hx     Stroke Neg Hx     Atrial Septal Defect Neg Hx        Assessment:    Doing well "   1. Paroxysmal atrial fibrillation    2. Chronic combined systolic and diastolic congestive heart failure, NYHA class 2    3. DCM (dilated cardiomyopathy)    4. LBBB (left bundle branch block)    5. Severe mitral regurgitation    6. Biventricular implantable cardioverter-defibrillator (ICD) in situ    7. Current use of long term anticoagulation    8. Acute kidney injury    9. Hypokalemia    10. Obesity (BMI 30.0-34.9)        Plan:    DC coumadin  Start Eliquis  I discussed routine device follow up including quarterly to bi-annual device checks for device function as well as yearly follow up in the EP clinic. The patient  was advised to call with any concerns regarding their device. Device clinic follow up as scheduled (6 months).   RTC to my clinic 1y        Orders Placed This Encounter   Procedures    ANTI-XA HEPARIN MONITORING     Trough blood level - 8 am or so     Standing Status:   Future     Standing Expiration Date:   12/2/2023         Follow up if symptoms worsen or fail to improve.    Medications Discontinued During This Encounter   Medication Reason    warfarin (COUMADIN) 5 MG tablet Alternate therapy    ketorolac (TORADOL) 10 mg tablet Therapy completed       Medications Ordered This Encounter   Medications    apixaban (ELIQUIS) 5 mg Tab     Sig: Take 1 tablet (5 mg total) by mouth 2 (two) times daily.     Dispense:  180 tablet     Refill:  3       Medication List with Changes/Refills   New Medications    APIXABAN (ELIQUIS) 5 MG TAB    Take 1 tablet (5 mg total) by mouth 2 (two) times daily.   Current Medications    ALLOPURINOL (ZYLOPRIM) 100 MG TABLET    100 mg once daily.     ALPRAZOLAM (XANAX XR) 0.5 MG 24 HR TABLET    Take 0.5 mg by mouth as needed.     CANDESARTAN (ATACAND) 32 MG TABLET    TAKE 1 TABLET(32 MG) BY MOUTH EVERY DAY    CARVEDILOL (COREG) 12.5 MG TABLET    Take 1 tablet (12.5 mg total) by mouth 2 (two) times a day.    COLCHICINE 0.6 MG TABLET    take 1 tablet by mouth twice a day if  needed for gout pain    FUROSEMIDE (LASIX) 20 MG TABLET    TAKE 1 TABLET(20 MG) BY MOUTH EVERY DAY    SPIRONOLACTONE (ALDACTONE) 25 MG TABLET    Take 1 tablet (25 mg total) by mouth once daily.    UREA (CARMOL) 40 % CREA    Apply topically once daily.   Discontinued Medications    KETOROLAC (TORADOL) 10 MG TABLET    Take 1 tablet (10 mg total) by mouth every 6 (six) hours as needed for Pain.    WARFARIN (COUMADIN) 5 MG TABLET    As directed

## 2022-10-03 NOTE — TELEPHONE ENCOUNTER
Initiated PA for Eliquis 5mg through CoverMyMeds. Pt is not covered by insurance plan, will contact pt to get updated information.

## 2022-10-04 NOTE — PROGRESS NOTES
10/04/22 Patient returned call regarding 10/03 missed lab appointment, reports 10/03 Dr Watkins started him on Eliquis -2 qd, see 10/03 chart note from Dr Watkins, reports his last warfarin dose was 9/26/22 and has follow up lab for Dr Watkins 10/19

## 2022-10-05 NOTE — PROGRESS NOTES
10/5/22- Spoke with Mr. Ramesh who confirmed that he has been started on Eliquis. Discussed how he will be discharged from clinic at this time but to let us know if warfarin is ever resumed.

## 2022-10-19 ENCOUNTER — LAB VISIT (OUTPATIENT)
Dept: LAB | Facility: HOSPITAL | Age: 67
End: 2022-10-19
Attending: INTERNAL MEDICINE
Payer: MEDICARE

## 2022-10-19 DIAGNOSIS — I48.0 PAROXYSMAL ATRIAL FIBRILLATION: ICD-10-CM

## 2022-10-19 LAB — FACT X PPP CHRO-ACNC: 1.12 IU/ML (ref 0.3–0.7)

## 2022-10-19 PROCEDURE — 36415 COLL VENOUS BLD VENIPUNCTURE: CPT | Mod: PO | Performed by: INTERNAL MEDICINE

## 2022-10-19 PROCEDURE — 85520 HEPARIN ASSAY: CPT | Performed by: INTERNAL MEDICINE

## 2022-10-25 ENCOUNTER — TELEPHONE (OUTPATIENT)
Dept: CARDIOLOGY | Facility: CLINIC | Age: 67
End: 2022-10-25
Payer: MEDICARE

## 2022-10-25 NOTE — PROGRESS NOTES
See comments below and call patient to discuss.   Please close encounter when done -- no need to route back to me.  Thanks  antiXa is 1.12  Dosing of Eliquis is adequate - keep same

## 2022-10-25 NOTE — TELEPHONE ENCOUNTER
Pt notified pb        ----- Message from Lex Cheng MD sent at 10/25/2022  8:22 AM CDT -----  See comments below and call patient to discuss.   Please close encounter when done -- no need to route back to me.  Thanks  antiXa is 1.12  Dosing of Eliquis is adequate - keep same

## 2022-11-22 RX ORDER — CANDESARTAN 32 MG/1
TABLET ORAL
Qty: 90 TABLET | Refills: 0 | Status: SHIPPED | OUTPATIENT
Start: 2022-11-22 | End: 2023-01-27 | Stop reason: SDUPTHER

## 2022-12-02 ENCOUNTER — CLINICAL SUPPORT (OUTPATIENT)
Dept: CARDIOLOGY | Facility: HOSPITAL | Age: 67
End: 2022-12-02
Payer: MEDICARE

## 2022-12-02 DIAGNOSIS — Z95.810 PRESENCE OF AUTOMATIC (IMPLANTABLE) CARDIAC DEFIBRILLATOR: ICD-10-CM

## 2022-12-02 PROCEDURE — 93296 REM INTERROG EVL PM/IDS: CPT | Performed by: INTERNAL MEDICINE

## 2022-12-02 PROCEDURE — 93295 CARDIAC DEVICE CHECK - REMOTE: ICD-10-PCS | Mod: S$GLB,,, | Performed by: INTERNAL MEDICINE

## 2022-12-02 PROCEDURE — 93295 DEV INTERROG REMOTE 1/2/MLT: CPT | Mod: S$GLB,,, | Performed by: INTERNAL MEDICINE

## 2022-12-07 RX ORDER — FUROSEMIDE 20 MG/1
20 TABLET ORAL DAILY
Qty: 30 TABLET | Refills: 3 | Status: SHIPPED | OUTPATIENT
Start: 2022-12-07 | End: 2022-12-08 | Stop reason: SDUPTHER

## 2022-12-08 RX ORDER — FUROSEMIDE 20 MG/1
20 TABLET ORAL DAILY
Qty: 30 TABLET | Refills: 11 | Status: SHIPPED | OUTPATIENT
Start: 2022-12-08 | End: 2023-05-03

## 2023-01-27 RX ORDER — CANDESARTAN 32 MG/1
TABLET ORAL
Qty: 90 TABLET | Refills: 0 | Status: SHIPPED | OUTPATIENT
Start: 2023-01-27 | End: 2023-02-16

## 2023-03-02 ENCOUNTER — CLINICAL SUPPORT (OUTPATIENT)
Dept: CARDIOLOGY | Facility: HOSPITAL | Age: 68
End: 2023-03-02
Payer: MEDICARE

## 2023-03-02 DIAGNOSIS — Z95.810 PRESENCE OF AUTOMATIC (IMPLANTABLE) CARDIAC DEFIBRILLATOR: ICD-10-CM

## 2023-03-02 PROCEDURE — 93296 REM INTERROG EVL PM/IDS: CPT | Performed by: INTERNAL MEDICINE

## 2023-04-14 ENCOUNTER — CLINICAL SUPPORT (OUTPATIENT)
Dept: CARDIOLOGY | Facility: HOSPITAL | Age: 68
End: 2023-04-14
Attending: INTERNAL MEDICINE
Payer: MEDICARE

## 2023-04-14 DIAGNOSIS — I48.0 PAROXYSMAL ATRIAL FIBRILLATION: ICD-10-CM

## 2023-04-14 DIAGNOSIS — I42.0 DILATED CARDIOMYOPATHY: ICD-10-CM

## 2023-04-14 DIAGNOSIS — I50.40 COMBINED SYSTOLIC AND DIASTOLIC CONGESTIVE HEART FAILURE, NYHA CLASS 2, UNSPECIFIED CONGESTIVE HEART FAILURE CHRONICITY: ICD-10-CM

## 2023-04-14 DIAGNOSIS — Z95.810 BIVENTRICULAR IMPLANTABLE CARDIOVERTER-DEFIBRILLATOR (ICD) IN SITU: ICD-10-CM

## 2023-04-14 DIAGNOSIS — I44.7 LBBB (LEFT BUNDLE BRANCH BLOCK): ICD-10-CM

## 2023-04-14 PROCEDURE — 93284 CARDIAC DEVICE CHECK - IN CLINIC & HOSPITAL: ICD-10-PCS | Mod: 26,,, | Performed by: INTERNAL MEDICINE

## 2023-04-14 PROCEDURE — 99999 PR PBB SHADOW E&M-EST. PATIENT-LVL I: CPT | Mod: PBBFAC,,,

## 2023-04-14 PROCEDURE — 93284 PRGRMG EVAL IMPLANTABLE DFB: CPT | Mod: 26,,, | Performed by: INTERNAL MEDICINE

## 2023-04-14 PROCEDURE — 99999 PR PBB SHADOW E&M-EST. PATIENT-LVL I: ICD-10-PCS | Mod: PBBFAC,,,

## 2023-04-14 PROCEDURE — 93284 PRGRMG EVAL IMPLANTABLE DFB: CPT

## 2023-05-03 RX ORDER — FUROSEMIDE 20 MG/1
TABLET ORAL
Qty: 30 TABLET | Refills: 11 | Status: SHIPPED | OUTPATIENT
Start: 2023-05-03

## 2023-05-26 DIAGNOSIS — I50.22 CHF (CONGESTIVE HEART FAILURE), NYHA CLASS III, CHRONIC, SYSTOLIC: ICD-10-CM

## 2023-05-26 RX ORDER — SPIRONOLACTONE 25 MG/1
25 TABLET ORAL DAILY
Qty: 90 TABLET | Refills: 3 | Status: SHIPPED | OUTPATIENT
Start: 2023-05-26

## 2023-05-31 ENCOUNTER — CLINICAL SUPPORT (OUTPATIENT)
Dept: CARDIOLOGY | Facility: HOSPITAL | Age: 68
End: 2023-05-31
Payer: MEDICARE

## 2023-05-31 DIAGNOSIS — Z95.810 PRESENCE OF AUTOMATIC (IMPLANTABLE) CARDIAC DEFIBRILLATOR: ICD-10-CM

## 2023-05-31 PROCEDURE — 93295 CARDIAC DEVICE CHECK - REMOTE: ICD-10-PCS | Mod: S$GLB,,, | Performed by: INTERNAL MEDICINE

## 2023-05-31 PROCEDURE — 93296 REM INTERROG EVL PM/IDS: CPT | Performed by: INTERNAL MEDICINE

## 2023-05-31 PROCEDURE — 93295 DEV INTERROG REMOTE 1/2/MLT: CPT | Mod: S$GLB,,, | Performed by: INTERNAL MEDICINE

## 2023-07-27 NOTE — LETTER
April 25, 2017        Gabriel Howard  [address]  [address]  [place] [address]  Phone: [phone]  Fax: [phone]     Ming Pate  [address] [place] [address] [address]  [place] [address]  Phone: [phone]  Fax: [phone]             [facility]  [address]  [facility] [address]  Phone: [phone]   Patient: Shae Ramseh III   MR Number: [id number]   YOB: 1955   Date of Visit: 4/25/2017       Dear Dr. Ming Pate, Gabriel Howard    Thank you for referring Shae Ramesh to me for evaluation. Attached you will find relevant portions of my assessment and plan of care.    If you have questions, please do not hesitate to call me. I look forward to following Shae Ramesh along with you.    Sincerely,    James Vieyra MD    Enclosure    If you would like to receive this communication electronically, please contact [email or web address] or [phone] to request [email or web address] Link access.    [email or web address] Link is a tool which provides read-only access to select patient information with whom you have a relationship. Its easy to use and provides real time access to review your patients record including encounter summaries, notes, results, and demographic information.    If you feel you have received this communication in error or would no longer like to receive these types of communications, please e-mail [email or web address]        Complex Repair And Flap Additional Text (Will Appearing After The Standard Complex Repair Text): The complex repair was not sufficient to completely close the primary defect. The remaining additional defect was repaired with the flap mentioned below.

## 2023-08-15 RX ORDER — CANDESARTAN 32 MG/1
32 TABLET ORAL DAILY
Qty: 90 TABLET | Refills: 3 | Status: SHIPPED | OUTPATIENT
Start: 2023-08-15

## 2023-08-24 RX ORDER — APIXABAN 5 MG/1
5 TABLET, FILM COATED ORAL 2 TIMES DAILY
Qty: 180 TABLET | Refills: 3 | Status: SHIPPED | OUTPATIENT
Start: 2023-08-24

## 2023-08-29 ENCOUNTER — CLINICAL SUPPORT (OUTPATIENT)
Dept: CARDIOLOGY | Facility: HOSPITAL | Age: 68
End: 2023-08-29
Payer: MEDICARE

## 2023-08-29 DIAGNOSIS — Z95.810 PRESENCE OF AUTOMATIC (IMPLANTABLE) CARDIAC DEFIBRILLATOR: ICD-10-CM

## 2023-08-29 PROCEDURE — 93296 REM INTERROG EVL PM/IDS: CPT | Performed by: INTERNAL MEDICINE

## 2023-08-29 RX ORDER — CARVEDILOL 12.5 MG/1
12.5 TABLET ORAL 2 TIMES DAILY
Qty: 180 TABLET | Refills: 9 | Status: SHIPPED | OUTPATIENT
Start: 2023-08-29

## 2023-09-29 NOTE — Clinical Note
Nephrology Daily Progress Note     Andriahiprince Santo  Date of Service: 9/29/2023        RECENT EVENTS / SUBJECTIVE:     Laying in bed   PD on last dwell 4 of 4   S/p right gluteal debridement     - saw patient in room, alert and interactive today. Video interpretor used during conversation.  Denies Abd pain, SOB, doing well.        PMHx, Social Hx , Medications and Allergies reviewed.      ALLERGIES:   Allergen Reactions   • Beef Allergy   (Food Or Med) Other (See Comments)     No Beef per pt request        OBJECTIVE:    Vital signs over past 48 Hours:  Vital Last Value 24 Hour Range   Temp 97.5 °F (36.4 °C) (09/29/23 0443) Temp  Min: 97.5 °F (36.4 °C)  Max: 98.6 °F (37 °C)   Pulse 68 (09/29/23 0443) Pulse  Min: 66  Max: 70   Respiratory 18 (09/28/23 2016) Resp  Min: 18  Max: 18   Non-Invasive  Blood Pressure 130/61 (09/29/23 0443) BP  Min: 129/61  Max: 146/69   Arterial  Blood Pressure   No data recorded   Pulse Ox 100 % (09/29/23 0443) SpO2  Min: 93 %  Max: 100 %     Ht/Wt Today Admitted   Weight 76 kg (167 lb 8.8 oz) 75.3 kg (166 lb)   Height  5' 9\" (175.3 cm)   BMI 24.74 24.51       Weight over past 48 Hours:  Patient Vitals for the past 48 hrs:   Weight   09/29/23 0600 76 kg (167 lb 8.8 oz)     Weight change:     Intake/Output this shift:  I/O this shift:  In: -   Out: 1936 [Other:1936]    Intake/Output Last 3 Shifts:  I/O last 3 completed shifts:  In: 760 [P.O.:760]  Out: 225 [Urine:225]    Vent settings for last 24 hours:       Hemodynamic parameters for last 24 hours:       Medications / Infusions  Scheduled:   Current Facility-Administered Medications   Medication Dose Route Frequency Provider Last Rate Last Admin   • aspirin chewable 81 mg  81 mg Oral Daily Hasan, Shayy, APNP   81 mg at 09/28/23 1421   • clopidogrel (PLAVIX) tablet 75 mg  75 mg Oral Daily Hasan, Shayy, APNP   75 mg at 09/28/23 1419   • polyethylene glycol (MIRALAX) packet 17 g  17 g Oral Daily Hasan, Shayy, APNP   17 g at 09/28/23 1414   •  Airway assessment complete   docusate sodium (COLACE) capsule 100 mg  100 mg Oral Daily Shayy Pitts APNP   100 mg at 09/28/23 1417   • sodium hypochlorite (DAKINS) 0.5 % (full strength) irrigation solution   Topical 2 times per day Jasmin Ryan MD   Given at 09/29/23 0253   • ceFEPIme (MAXIPIME) 2,000 mg in sodium chloride 0.9 % 100 mL IVPB  2,000 mg Intravenous Q48H Anthony Sterling  mL/hr at 09/27/23 1815 2,000 mg at 09/27/23 1815   • insulin glargine (LANTUS) injection 15 Units  15 Units Subcutaneous 2 times per day Shayy Pitts APNP   15 Units at 09/28/23 2209   • insulin lispro (ADMELOG, HumaLOG) injection 5 Units  5 Units Subcutaneous TID  Shayy Pitts APNP   5 Units at 09/28/23 1443   • Dianeal Low Calcium/2.5% 5,000 mL peritoneal dialysis automated (Cycler) solution   Intraperitoneal QHS Clint Montano MD   Given at 09/28/23 2132   • Dianeal Low Calcium/1.5% 3,000 mL peritoneal dialysis automated (Cycler) solution   Intraperitoneal QHS Clint Montano MD   Given at 09/28/23 2133   • Dianeal Low Calcium/1.5% 3,000 mL peritoneal dialysis automated (Cycler) solution   Intraperitoneal QHS Clint Montano MD   Given at 09/28/23 2133   • [Held by provider] amLODIPine (NORVASC) tablet 10 mg  10 mg Oral Daily Shayy Pitts APNP       • carvedilol (COREG) tablet 3.125 mg  3.125 mg Oral BID  Shayy Pitts APNP   3.125 mg at 09/28/23 2004   • hydrALAZINE (APRESOLINE) tablet 25 mg  25 mg Oral TID Shayy Pitts APNP   25 mg at 09/28/23 2004   • pentoxifylline (TRENtal) CR tablet 400 mg  400 mg Oral Daily with breakfast Shayy Pitts APNP   400 mg at 09/28/23 0855   • sodium bicarbonate tablet 650 mg  650 mg Oral TID Shayy Pitts APNP   650 mg at 09/28/23 1447   • sevelamer carbonate (RENVELA) tablet 2,400 mg  2,400 mg Oral TID WC Shayy Pitts APNP   2,400 mg at 09/28/23 1445   • atorvastatin (LIPITOR) tablet 20 mg  20 mg Oral Nightly Shayy Pitts APNP   20 mg at 09/28/23 2005   • acetaminophen (TYLENOL) tablet 650 mg  650 mg  Oral 3 times per day Briseida Robledo PA-C   650 mg at 09/29/23 0645   • nystatin (MYCOSTATIN) 610489 UNIT/ML suspension 500,000 Units  500,000 Units Swish & Swallow 4x Daily Leo Valiente APNP   500,000 Units at 09/28/23 2007   • gentamicin (GARAMYCIN) 0.1 % ointment   Topical Daily Leo Valiente APNP   Given at 09/28/23 0853   • sodium chloride 0.9 % injection 2 mL  2 mL Intracatheter 2 times per day Briseida Robledo PA-C   2 mL at 09/28/23 2204   • metroNIDAZOLE (FLAGYL) premix IVPB 500 mg  500 mg Intravenous 3 times per day Briseida Robledo PA-C 200 mL/hr at 09/29/23 0644 500 mg at 09/29/23 0644   • heparin (porcine) injection 5,000 Units  5,000 Units Subcutaneous 3 times per day Briseida Robledo PA-C   5,000 Units at 09/29/23 0645   • VANCOMYCIN - PHARMACIST MONITORED Misc   Does not apply See Admin Instructions Briseida Robledo PA-C       • B complex-vitamin C-folic acid (NEPHRO-DEMARCO) tablet 0.8 mg  1 tablet Oral Daily Clint Montano MD   0.8 mg at 09/28/23 0854   • insulin lispro (ADMELOG,HumaLOG) - Correction Dose   Subcutaneous TID  Jasmin Ryan MD   2 Units at 09/28/23 1444   • epoetin robyn-epbx (RETACRIT) 64879 UNIT/ML injection 10,000 Units  10,000 Units Subcutaneous Once per day on Mon Wed Fri Clint Montano MD   10,000 Units at 09/27/23 1816        Continuous Infusions:   Current Facility-Administered Medications   Medication Dose Route Frequency Provider Last Rate Last Admin        PRN:   Current Facility-Administered Medications   Medication Dose Route Frequency Provider Last Rate Last Admin   • sodium hypochlorite (DAKINS) 0.125 % (1/4 strength) irrigation solution   Topical PRN Lizeth Torres MD       • bisacodyl (DULCOLAX) suppository 10 mg  10 mg Rectal Daily PRN Shayy Pitts APNP       • lidocaine 2% urethral (UROJET) 2 % jelly 10 mL  10 mL Topical PRN Carey Valiente APNP       • HYDROcodone-acetaminophen (NORCO) 5-325 MG per tablet 1 tablet  1 tablet Oral Q4H PRN  Briseida Robledo PA-C        Or   • HYDROcodone-acetaminophen (NORCO)  MG per tablet 1 tablet  1 tablet Oral Q4H PRN Briseida Robledo PA-C   1 tablet at 09/28/23 1959   • HYDROmorphone (DILAUDID) injection 0.2 mg  0.2 mg Intravenous Q2H PRN Briseida Robledo PA-C   0.2 mg at 09/26/23 2152   • lidocaine 2% urethral (UROJET) 2 % jelly 10 mL  10 mL Transurethral PRN Kamilla Matt PA-C   10 mL at 09/25/23 1916   • sodium chloride 0.9 % flush bag 25 mL  25 mL Intravenous PRN Briseida Robledo PA-C 25 mL/hr at 09/28/23 1717 25 mL at 09/28/23 1717   • sodium chloride (NORMAL SALINE) 0.9 % bolus 500 mL  500 mL Intravenous PRN Briseida Robledo PA-C       • nitroGLYCERIN (NITROSTAT) sublingual tablet 0.4 mg  0.4 mg Sublingual Q5 Min PRN Briseida Robledo PA-C       • dextrose 50 % injection 25 g  25 g Intravenous PRN Jasmin Ryan MD       • dextrose 50 % injection 12.5 g  12.5 g Intravenous PRN Jasmin Ryan MD       • glucagon (GLUCAGEN) injection 1 mg  1 mg Intramuscular PRN Jasmin Ryan MD       • dextrose (GLUTOSE) 40 % gel 15 g  15 g Oral PRN Jasmin Ryan MD       • dextrose (GLUTOSE) 40 % gel 30 g  30 g Oral PRN Jasmin Ryan MD                Physical Exam  Gen  NAD, alert and interactive  HEENT  MMM  Neck  Supple  Resp  no IWOB  ABD  Soft, non tender, non distended, no tenderness near PD catheter  Ext  Trace edema   PD Site  Non-tender, no erythema  Neuro Awake and interactive, no gross deficit        Laboratory Results     Recent Labs   Lab 09/29/23  0416 09/26/23  0430 09/25/23  0420 09/24/23  1528 09/24/23  1527   SODIUM 133* 133* 135 136  --    POTASSIUM 3.4 3.6 3.5 3.5  --    CHLORIDE 97 97 99 98  --    CO2 26 24 25 28  --    BUN 64* 73* 70* 65*  --    CREATININE 6.02* 6.08* 6.65* 7.06* 7.90*   GLUCOSE 185* 463* 332* 174*  --    CALCIUM 7.8* 8.7 8.5 8.7  --    MG  --   --  2.2 2.2  --    PHOS  --   --  5.9*  --   --    ALBUMIN  --   --  1.3* 1.4*  --    AST  --   --  26 27  --    GPT   --   --  25 27  --    ALKPT  --   --  215* 228*  --    BILIRUBIN  --   --  0.4 0.5  --        Anemia  Recent Labs   Lab 09/29/23  0416 09/28/23  0654 09/27/23 0417 09/26/23  0430 09/25/23  0420   WBC 11.7* 11.7* 12.2* 14.8* 17.1*   HGB 7.7* 7.9* 7.7* 7.7* 6.8*   HCT 24.7* 24.9* 23.8* 23.3* 21.3*    281 283 279 263     No results found     Mineral & Bone Disorder  Recent Labs   Lab 09/29/23 0416 09/26/23  0430 09/25/23  0420 09/24/23  1528   CALCIUM 7.8* 8.7 8.5 8.7   PHOS  --   --  5.9*  --    MG  --   --  2.2 2.2      No results found for: \"PTH\"    Urine Panel  Lab Results   Component Value Date    UKET Negative 09/25/2023    USPG 1.019 09/25/2023    UPROT 300 (A) 09/25/2023    UWBC Negative 09/25/2023    URBC Negative 09/25/2023    UBILI Negative 09/25/2023    UPH 7.5 (H) 09/25/2023    UROB 0.2 09/25/2023          Assessment / Plan     End Stage Renal Disease, on chronic peritoneal dialysis              -Avoid nephrotoxins, NSAIDs, Fleets             -Pharmacy to ensure medications adjusted for reduced CrCl and PD clearance              -Continue PD    Cloudy PD fluid reported   -Concerns for PD peritonitis, fluid appears clear today    -ID following    -Continue Nystatin Swish and Swallow 500,000 units 4 times daily as long as patient is receiving antibiotics     Anemia, in ESRD    -Retacrit 10,000 units MWF   -Hold DAMARSI if Hgb > 11 g/dL   -PRBCs per primary team      Hypertension / Volume             -Antihypertensives     Secondary Hyperparathyroidism, of chronic kidney disease              -Renvela 2,400 mg PO TID with meals      Nutrition / Hypoalbuminemia              -Renal Diet    Gluteal Wound   -S/p debridement 09/25/23   -wound care following        Recs  Continue same peritoneal dialysis  Tx Cycler: Alt 1.5% & 2.5% Dianeal, 2.5L fill, 4 ex, 9 hours, no LBF  PD cell count noted    -Nucleated Cells 2,736; Neutrophils 93%   -ID following   Nystatin Swish & Swallow while on antibiotics   Gentamycin  ointment to PD exit site       Fito Stewart DO  Internal Medicine, PGY-2  #[phone]    Available by pager today 8:00am to 4:30pm at [phone]  Use answering service [phone] after 4:30pm, before 8am, and weekends for on-call provider    I have examined the patient, reviewed the pertinent diagnostic studies and medical data, and have participated in the development of the treatment plan with the renal team. I have made appropriate addendums and agree with the documentation stated above.  Joel Boyce MD

## 2023-10-02 DIAGNOSIS — I48.0 PAROXYSMAL ATRIAL FIBRILLATION: Primary | ICD-10-CM

## 2023-10-09 ENCOUNTER — HOSPITAL ENCOUNTER (OUTPATIENT)
Dept: CARDIOLOGY | Facility: HOSPITAL | Age: 68
Discharge: HOME | End: 2023-10-09
Attending: INTERNAL MEDICINE
Payer: MEDICARE

## 2023-10-09 ENCOUNTER — OFFICE VISIT (OUTPATIENT)
Dept: CARDIOLOGY | Facility: CLINIC | Age: 68
End: 2023-10-09
Payer: MEDICARE

## 2023-10-09 VITALS
SYSTOLIC BLOOD PRESSURE: 120 MMHG | BODY MASS INDEX: 29.69 KG/M2 | HEIGHT: 73 IN | WEIGHT: 224 LBS | DIASTOLIC BLOOD PRESSURE: 70 MMHG

## 2023-10-09 VITALS
BODY MASS INDEX: 29.67 KG/M2 | OXYGEN SATURATION: 94 % | WEIGHT: 224.88 LBS | SYSTOLIC BLOOD PRESSURE: 120 MMHG | HEART RATE: 65 BPM | DIASTOLIC BLOOD PRESSURE: 70 MMHG

## 2023-10-09 DIAGNOSIS — I10 PRIMARY HYPERTENSION: Chronic | ICD-10-CM

## 2023-10-09 DIAGNOSIS — I42.0 DCM (DILATED CARDIOMYOPATHY): ICD-10-CM

## 2023-10-09 DIAGNOSIS — Z95.810 BIVENTRICULAR IMPLANTABLE CARDIOVERTER-DEFIBRILLATOR (ICD) IN SITU: ICD-10-CM

## 2023-10-09 DIAGNOSIS — I34.0 SEVERE MITRAL REGURGITATION: ICD-10-CM

## 2023-10-09 DIAGNOSIS — E78.5 DYSLIPIDEMIA: ICD-10-CM

## 2023-10-09 DIAGNOSIS — I50.42 CHRONIC COMBINED SYSTOLIC AND DIASTOLIC CONGESTIVE HEART FAILURE, NYHA CLASS 2: ICD-10-CM

## 2023-10-09 DIAGNOSIS — E66.9 OBESITY (BMI 30.0-34.9): ICD-10-CM

## 2023-10-09 DIAGNOSIS — I48.0 PAROXYSMAL ATRIAL FIBRILLATION: ICD-10-CM

## 2023-10-09 DIAGNOSIS — I50.40 COMBINED SYSTOLIC AND DIASTOLIC CONGESTIVE HEART FAILURE, NYHA CLASS 2, UNSPECIFIED CONGESTIVE HEART FAILURE CHRONICITY: ICD-10-CM

## 2023-10-09 DIAGNOSIS — I44.7 LBBB (LEFT BUNDLE BRANCH BLOCK): ICD-10-CM

## 2023-10-09 DIAGNOSIS — I42.0 DILATED CARDIOMYOPATHY: ICD-10-CM

## 2023-10-09 DIAGNOSIS — I50.9 CONGESTIVE HEART FAILURE, UNSPECIFIED HF CHRONICITY, UNSPECIFIED HEART FAILURE TYPE: ICD-10-CM

## 2023-10-09 DIAGNOSIS — F17.201 TOBACCO ABUSE, IN REMISSION: Chronic | ICD-10-CM

## 2023-10-09 DIAGNOSIS — I50.9 CONGESTIVE HEART FAILURE, UNSPECIFIED HF CHRONICITY, UNSPECIFIED HEART FAILURE TYPE: Primary | ICD-10-CM

## 2023-10-09 LAB
AORTIC ROOT ANNULUS: 3.29 CM
ASCENDING AORTA: 3.85 CM
AV INDEX (PROSTH): 0.6
AV MEAN GRADIENT: 4 MMHG
AV PEAK GRADIENT: 8 MMHG
AV VALVE AREA BY VELOCITY RATIO: 2.22 CM²
AV VALVE AREA: 2.24 CM²
AV VELOCITY RATIO: 0.6
BSA FOR ECHO PROCEDURE: 2.29 M2
CV ECHO LV RWT: 0.24 CM
DOP CALC AO PEAK VEL: 1.41 M/S
DOP CALC AO VTI: 30.6 CM
DOP CALC LVOT AREA: 3.7 CM2
DOP CALC LVOT DIAMETER: 2.18 CM
DOP CALC LVOT PEAK VEL: 0.84 M/S
DOP CALC LVOT STROKE VOLUME: 68.64 CM3
DOP CALC RVOT PEAK VEL: 0.82 M/S
DOP CALC RVOT VTI: 19 CM
DOP CALCLVOT PEAK VEL VTI: 18.4 CM
E WAVE DECELERATION TIME: 261.85 MSEC
E/A RATIO: 0.65
E/E' RATIO: 11.09 M/S
ECHO LV POSTERIOR WALL: 0.94 CM (ref 0.6–1.1)
FRACTIONAL SHORTENING: 20 % (ref 28–44)
INTERVENTRICULAR SEPTUM: 0.99 CM (ref 0.6–1.1)
IVC DIAMETER: 1.46 CM
IVRT: 145.58 MSEC
LA MAJOR: 7.05 CM
LA MINOR: 6.28 CM
LA WIDTH: 5 CM
LEFT ATRIUM SIZE: 5.2 CM
LEFT ATRIUM VOLUME INDEX MOD: 57.6 ML/M2
LEFT ATRIUM VOLUME INDEX: 65 ML/M2
LEFT ATRIUM VOLUME MOD: 130.11 CM3
LEFT ATRIUM VOLUME: 146.81 CM3
LEFT INTERNAL DIMENSION IN SYSTOLE: 6.17 CM (ref 2.1–4)
LEFT VENTRICLE DIASTOLIC VOLUME INDEX: 142.54 ML/M2
LEFT VENTRICLE DIASTOLIC VOLUME: 322.15 ML
LEFT VENTRICLE MASS INDEX: 163 G/M2
LEFT VENTRICLE SYSTOLIC VOLUME INDEX: 85 ML/M2
LEFT VENTRICLE SYSTOLIC VOLUME: 192.09 ML
LEFT VENTRICULAR INTERNAL DIMENSION IN DIASTOLE: 7.76 CM (ref 3.5–6)
LEFT VENTRICULAR MASS: 368.81 G
LV LATERAL E/E' RATIO: 10.17 M/S
LV SEPTAL E/E' RATIO: 12.2 M/S
LVOT MG: 1.52 MMHG
LVOT MV: 0.57 CM/S
MR PISA EROA: 0.29 CM2
MV PEAK A VEL: 0.94 M/S
MV PEAK E VEL: 0.61 M/S
MV STENOSIS PRESSURE HALF TIME: 75.94 MS
MV VALVE AREA P 1/2 METHOD: 2.9 CM2
OHS LV EJECTION FRACTION SIMPSONS BIPLANE MOD: 35 %
PISA MRMAX VEL: 5.87 M/S
PISA RADIUS: 0.83 CM
PISA TR MAX VEL: 3 M/S
PISA VN NYQUIST MS: 0.39 M/S
PISA VN NYQUIST: 0.39 M/S
PV MEAN GRADIENT: 1 MMHG
PV MV: 0.54 M/S
PV PEAK GRADIENT: 4 MMHG
PV PEAK VELOCITY: 0.94 M/S
RA MAJOR: 5.29 CM
RA PRESSURE ESTIMATED: 3 MMHG
RA WIDTH: 4.91 CM
RIGHT VENTRICULAR END-DIASTOLIC DIMENSION: 4.47 CM
RV TB RVSP: 6 MMHG
SINUS: 3.58 CM
STJ: 2.96 CM
TDI LATERAL: 0.06 M/S
TDI SEPTAL: 0.05 M/S
TDI: 0.06 M/S
TR MAX PG: 36 MMHG
TRICUSPID ANNULAR PLANE SYSTOLIC EXCURSION: 2.58 CM
TV REST PULMONARY ARTERY PRESSURE: 39 MMHG
Z-SCORE OF LEFT VENTRICULAR DIMENSION IN END DIASTOLE: -0.66
Z-SCORE OF LEFT VENTRICULAR DIMENSION IN END SYSTOLE: 1.63

## 2023-10-09 PROCEDURE — 3288F FALL RISK ASSESSMENT DOCD: CPT | Mod: CPTII,S$GLB,, | Performed by: INTERNAL MEDICINE

## 2023-10-09 PROCEDURE — 3074F SYST BP LT 130 MM HG: CPT | Mod: CPTII,S$GLB,, | Performed by: INTERNAL MEDICINE

## 2023-10-09 PROCEDURE — 99215 PR OFFICE/OUTPT VISIT, EST, LEVL V, 40-54 MIN: ICD-10-PCS | Mod: S$GLB,,, | Performed by: INTERNAL MEDICINE

## 2023-10-09 PROCEDURE — 3008F BODY MASS INDEX DOCD: CPT | Mod: CPTII,S$GLB,, | Performed by: INTERNAL MEDICINE

## 2023-10-09 PROCEDURE — 3288F PR FALLS RISK ASSESSMENT DOCUMENTED: ICD-10-PCS | Mod: CPTII,S$GLB,, | Performed by: INTERNAL MEDICINE

## 2023-10-09 PROCEDURE — 1159F MED LIST DOCD IN RCRD: CPT | Mod: CPTII,S$GLB,, | Performed by: INTERNAL MEDICINE

## 2023-10-09 PROCEDURE — 3078F PR MOST RECENT DIASTOLIC BLOOD PRESSURE < 80 MM HG: ICD-10-PCS | Mod: CPTII,S$GLB,, | Performed by: INTERNAL MEDICINE

## 2023-10-09 PROCEDURE — 1126F AMNT PAIN NOTED NONE PRSNT: CPT | Mod: CPTII,S$GLB,, | Performed by: INTERNAL MEDICINE

## 2023-10-09 PROCEDURE — 1160F RVW MEDS BY RX/DR IN RCRD: CPT | Mod: CPTII,S$GLB,, | Performed by: INTERNAL MEDICINE

## 2023-10-09 PROCEDURE — 93306 ECHO (CUPID ONLY): ICD-10-PCS | Mod: 26,,, | Performed by: INTERNAL MEDICINE

## 2023-10-09 PROCEDURE — 1159F PR MEDICATION LIST DOCUMENTED IN MEDICAL RECORD: ICD-10-PCS | Mod: CPTII,S$GLB,, | Performed by: INTERNAL MEDICINE

## 2023-10-09 PROCEDURE — 4010F PR ACE/ARB THEARPY RXD/TAKEN: ICD-10-PCS | Mod: CPTII,S$GLB,, | Performed by: INTERNAL MEDICINE

## 2023-10-09 PROCEDURE — 3074F PR MOST RECENT SYSTOLIC BLOOD PRESSURE < 130 MM HG: ICD-10-PCS | Mod: CPTII,S$GLB,, | Performed by: INTERNAL MEDICINE

## 2023-10-09 PROCEDURE — 3008F PR BODY MASS INDEX (BMI) DOCUMENTED: ICD-10-PCS | Mod: CPTII,S$GLB,, | Performed by: INTERNAL MEDICINE

## 2023-10-09 PROCEDURE — 4010F ACE/ARB THERAPY RXD/TAKEN: CPT | Mod: CPTII,S$GLB,, | Performed by: INTERNAL MEDICINE

## 2023-10-09 PROCEDURE — 1101F PR PT FALLS ASSESS DOC 0-1 FALLS W/OUT INJ PAST YR: ICD-10-PCS | Mod: CPTII,S$GLB,, | Performed by: INTERNAL MEDICINE

## 2023-10-09 PROCEDURE — 93306 TTE W/DOPPLER COMPLETE: CPT

## 2023-10-09 PROCEDURE — 99999 PR PBB SHADOW E&M-EST. PATIENT-LVL III: ICD-10-PCS | Mod: PBBFAC,,, | Performed by: INTERNAL MEDICINE

## 2023-10-09 PROCEDURE — 93284 PRGRMG EVAL IMPLANTABLE DFB: CPT | Mod: 26,,, | Performed by: INTERNAL MEDICINE

## 2023-10-09 PROCEDURE — 93284 CARDIAC DEVICE CHECK - IN CLINIC & HOSPITAL: ICD-10-PCS | Mod: 26,,, | Performed by: INTERNAL MEDICINE

## 2023-10-09 PROCEDURE — 99215 OFFICE O/P EST HI 40 MIN: CPT | Mod: S$GLB,,, | Performed by: INTERNAL MEDICINE

## 2023-10-09 PROCEDURE — 1101F PT FALLS ASSESS-DOCD LE1/YR: CPT | Mod: CPTII,S$GLB,, | Performed by: INTERNAL MEDICINE

## 2023-10-09 PROCEDURE — 1126F PR PAIN SEVERITY QUANTIFIED, NO PAIN PRESENT: ICD-10-PCS | Mod: CPTII,S$GLB,, | Performed by: INTERNAL MEDICINE

## 2023-10-09 PROCEDURE — 99999 PR PBB SHADOW E&M-EST. PATIENT-LVL III: CPT | Mod: PBBFAC,,, | Performed by: INTERNAL MEDICINE

## 2023-10-09 PROCEDURE — 3078F DIAST BP <80 MM HG: CPT | Mod: CPTII,S$GLB,, | Performed by: INTERNAL MEDICINE

## 2023-10-09 PROCEDURE — 93284 PRGRMG EVAL IMPLANTABLE DFB: CPT

## 2023-10-09 PROCEDURE — 93306 TTE W/DOPPLER COMPLETE: CPT | Mod: 26,,, | Performed by: INTERNAL MEDICINE

## 2023-10-09 PROCEDURE — 1160F PR REVIEW ALL MEDS BY PRESCRIBER/CLIN PHARMACIST DOCUMENTED: ICD-10-PCS | Mod: CPTII,S$GLB,, | Performed by: INTERNAL MEDICINE

## 2023-10-12 ENCOUNTER — TELEPHONE (OUTPATIENT)
Dept: CARDIOLOGY | Facility: CLINIC | Age: 68
End: 2023-10-12
Payer: MEDICARE

## 2023-10-12 DIAGNOSIS — I34.0 MITRAL VALVE INSUFFICIENCY, UNSPECIFIED ETIOLOGY: Primary | ICD-10-CM

## 2023-10-12 NOTE — PROGRESS NOTES
I have never seen this patient. As the clinic consultant, I have received a message from Dr. Niesha Rodriguez requesting a referral for a MitraClip evaluation. His last TTE reports moderate-severe eccentric MR. Referral order sent as a courtesy.

## 2023-10-12 NOTE — TELEPHONE ENCOUNTER
Left a message for him to contact the office ----- Message from Lex Cheng MD sent at 10/12/2023 11:13 AM CDT -----  Regarding: FW:  Labs are ok  BNP a bit higher than in the past. If he is not dyspneic then no change  ----- Message -----  From: [facility]  Sent: 10/9/2023  11:35 PM CDT  To: Lex Cheng MD

## 2023-11-21 ENCOUNTER — TELEPHONE (OUTPATIENT)
Dept: CARDIOLOGY | Facility: CLINIC | Age: 68
End: 2023-11-21
Payer: MEDICARE

## 2023-11-21 NOTE — PROGRESS NOTES
Subjective:   Patient ID:  Shae Ramesh III is a 67 y.o. male     Chief complaint: CHF/CRTD    HPI    Background (see note by N May dated 4/12/21):  DCM, Chronic systolic CHF, S/P CRT-D([facility]), HTN, HLP, Metabolic syndrome, AFIB on Coumadin, morbid obesity. He has had excess stress due to his wife's medical conditions recently due to CVA.      Device check completed today in office, noted AFL episodes noted and reviewed with Dr Cheng. Plans for BNP today to assess volume status.      He does endorse that the last few days, he has had increased in SLOAN episodes.      Denies chest pain or anginal equivalents. Denies orthopnea, PND or abdominal bloating. Reports regular walking without any issues lately. NO leg swelling or claudications. No recent falls, syncope or near syncopal events. Reports compliance with medications and dietary restrictions. NO CNS complaints to suggest TIA or CVA today. No signs of abnormal bleeding on Coumadin.     Most recent INR 2.8, followed by [facility].      Update 10/18/2021:  ICD eval today - all Ok - occ PAF -   He has been feeling well in general   He has bee now off amiodarone  I have reviewed the actual image of the ECG tracing obtained today and it shows PBiV pacing with a favorable QRS morphology /duration (QRSd no more than 115 Msec in any one lead).        Update 10/3/2022:  CBC,CMP,NT ProBNP, TSH, Echo were all updated   Labs were all WNL , EF 40%.   He has been doing well - except for the time when he ran out of Coreg and didn't realize it - all went back to normal after he restarted it.     I have reviewed the actual image of the ECG tracing obtained today and it shows PBiV pacing with a favorable QRS morphology /duration (QRSd no more than  120  Msec in any one lead).a An OT/LBB/septalPVC is noted.   He is now out of coumadin for a few days.      Recent ICD eval - all good    >>  DC coumadin  Start Eliquis    Update 11/9/23 :  Has been doing well.  He is active  and complains of no dyspnea with exertion.  Patient's device (CRTD)was fully evaluated today under my direct supervision and real-time feedback.  Summary of findings are as listed below:  Device is in good repair.   The battery is not near DAVID.  The sensing and pacing thresholds are favorable with well maintained safety margins.   There was a 3 hour 42 minute AF episode noted on 9/17/23.  This was in the midst of the core view flagged possible fluid overload.  Patient was asymptomatic though.  There were no device data indicating possible ongoing fluid retention.  Core view did show 10 day period of possible fluid between September 9th and September 19th.  Recommendation:  Device follow up as per clinic routine with remote and in house checks    I have reviewed the actual image of the ECG tracing obtained today and it shows PBiV pacing with a favorable QRS morphology /duration (QRSd no more than 130 Msec in any one lead- note that measurements discount local isoelectric latency. Which in this case measures @ 5 msec).      Current Outpatient Medications   Medication Sig    allopurinol (ZYLOPRIM) 100 MG tablet 100 mg once daily.     alprazolam (XANAX XR) 0.5 MG 24 hr tablet Take 0.5 mg by mouth as needed.     candesartan (ATACAND) 32 MG tablet Take 1 tablet (32 mg total) by mouth once daily.    carvediloL (COREG) 12.5 MG tablet Take 1 tablet (12.5 mg total) by mouth 2 (two) times a day.    colchicine 0.6 mg tablet take 1 tablet by mouth twice a day if needed for gout pain    ELIQUIS 5 mg Tab TAKE 1 TABLET(5 MG) BY MOUTH TWICE DAILY    furosemide (LASIX) 20 MG tablet TAKE 1 TABLET BY MOUTH EVERY DAY    spironolactone (ALDACTONE) 25 MG tablet Take 1 tablet (25 mg total) by mouth once daily.    urea (CARMOL) 40 % Crea Apply topically once daily. (Patient not taking: Reported on 10/9/2023)     No current facility-administered medications for this visit.       Review of Systems     Constitutional: Reviewed  for decreased  appetite, weight gain and weight loss.   HENT: Reviewed for nosebleeds.    Eyes:  Reviewed for blurred vision and visual disturbance.   Cardiovascular: Reviewed for chest pain, claudication, cyanosis,dyspnea on exertion, leg swelling, orthopnea,paroxysmal nocturnal dyspnearregular heartbeats, palpitations, near-syncope, and syncope.   Respiratory: Reviewed for cough, shortness of breath, wheezing, sleep disturbances due to breathing and snoring, .    Endocrine: Reviewed for heat intolerance.   Hematologic/Lymphatic: Reviewed for easy bruisability/bleeding.   Skin: Reviewed for rash.   Musculoskeletal: Reviewed for muscle weakness and myalgias.   Gastrointestinal: Reviewed for abdominal pain, anorexia, melena, nausea and vomiting.   Genitourinary: Reviewed for hesitancy, frequency, nocturia and incontinence.   Neurological: Reviewed for excessive daytime sleepiness, dizziness, vertigo, weakness, headaches, loss of balance and seizures,   Psychiatric/Behavioral:  Reviewed for insomnia, altered mental status, depression, anxiety and nervousness.       All symptoms reviewed above were negative except for NONE       Social History     Tobacco Use   Smoking Status Former    Current packs/day: 0.00    Average packs/day: 1 pack/day for 20.0 years (20.0 ttl pk-yrs)    Types: Cigarettes    Start date: 1/10/1992    Quit date: 1/10/2012    Years since quittin.8   Smokeless Tobacco Never        Objective:     Physical Exam  Vitals and nursing note reviewed.   Constitutional:       Appearance: Normal appearance. He is well-developed.      Comments: overweight   HENT:      Head: Normocephalic and atraumatic.      Right Ear: External ear normal.      Left Ear: External ear normal.   Eyes:      Conjunctiva/sclera: Conjunctivae normal.      Left eye: Left conjunctiva is not injected. No hemorrhage.     Pupils: Pupils are equal, round, and reactive to light.   Neck:      Thyroid: No thyromegaly.      Vascular: No JVD.    Cardiovascular:      Rate and Rhythm: Normal rate and regular rhythm.      Chest Wall: PMI is not displaced.      Pulses: Intact distal pulses.           Carotid pulses are 2+ on the right side and 2+ on the left side.       Radial pulses are 2+ on the right side and 2+ on the left side.        Dorsalis pedis pulses are 2+ on the right side and 2+ on the left side.        Posterior tibial pulses are 2+ on the right side and 2+ on the left side.      Heart sounds: No midsystolic click and no opening snap. Murmur heard.      High-pitched blowing holosystolic murmur is present with a grade of 2/6 at the apex.      No friction rub. No gallop.   Pulmonary:      Effort: Pulmonary effort is normal. No respiratory distress.      Breath sounds: Normal breath sounds. No wheezing or rales.   Chest:      Chest wall: No tenderness.      Comments: Device pocket is in excellent repair.  Abdominal:      Palpations: Abdomen is soft. Abdomen is not rigid. There is no hepatomegaly.      Tenderness: There is no abdominal tenderness. There is no guarding.      Comments: Obese abdomen   Musculoskeletal:         General: No tenderness. Normal range of motion.      Cervical back: Neck supple.      Right knee: No swelling.      Left knee: No swelling.      Right lower leg: No swelling.      Left lower leg: No swelling.      Right ankle: No swelling.      Left ankle: No swelling.      Right foot: No swelling.      Left foot: No swelling.   Skin:     General: Skin is warm and dry.      Coloration: Skin is not pale.      Findings: No rash.   Neurological:      General: No focal deficit present.      Mental Status: He is alert and oriented to person, place, and time.      Cranial Nerves: No cranial nerve deficit.      Coordination: Coordination normal.      Deep Tendon Reflexes: Reflexes are normal and symmetric.   Psychiatric:         Mood and Affect: Mood normal.         Behavior: Behavior normal.       /70   Pulse 65   Wt 102 kg  (224 lb 13.9 oz)   SpO2 (!) 94%   BMI 29.67 kg/m²       Results for orders placed during the hospital encounter of 10/25/21    Echo Saline Bubble? No    Interpretation Summary  · The left ventricle is severely enlarged with eccentric hypertrophy and mildly decreased systolic function. The estimated ejection fraction is 40%.  · Normal right ventricular size with normal right ventricular systolic function.  · Grade I left ventricular diastolic dysfunction.  · Biatrial enlargement.  · The estimated PA systolic pressure is 35 mmHg.  · Intermediate central venous pressure (8 mmHg).    WBC   Date Value Ref Range Status   10/09/2023 6.99 3.90 - 12.70 K/uL Final     Hematocrit   Date Value Ref Range Status   10/09/2023 42.8 40.0 - 54.0 % Final     Hemoglobin   Date Value Ref Range Status   10/09/2023 14.2 14.0 - 18.0 g/dL Final     Lab Results   Component Value Date     10/09/2023     Lab Results   Component Value Date    CREATININE 1.3 10/09/2023    EGFRNORACEVR >60.0 10/09/2023    K 4.0 10/09/2023     Lab Results   Component Value Date     (H) 10/09/2023            reports current alcohol use.  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    Hypertension     LBBB (left bundle branch block) 6/12/2013    Obesity     Valvular regurgitation      Past Surgical History:   Procedure Laterality Date    ADENOIDECTOMY      APPENDECTOMY      CARDIAC DEFIBRILLATOR PLACEMENT      REPLACEMENT OF IMPLANTABLE CARDIOVERTER-DEFIBRILLATOR (ICD) GENERATOR Left 6/24/2020    Procedure: REPLACEMENT, PULSE GENERATOR, ICD;  Surgeon: Lex Cheng MD;  Location: [facility] CATH LAB;  Service: Cardiology;  Laterality: Left;  original device IAN  to st pham CRT-D    TONSILLECTOMY       Family History   Problem Relation Age of Onset    Hypertension Father      Diabetes Maternal Grandmother     Diabetes Maternal Grandfather     Heart disease Paternal Grandmother     No Known Problems Mother     No Known Problems Brother     No Known Problems Paternal Grandfather     Cancer Sister     Early death Sister     Diabetes Sister     Diabetes Maternal Aunt     No Known Problems Maternal Uncle     No Known Problems Paternal Aunt     No Known Problems Paternal Uncle     Anemia Neg Hx     Arrhythmia Neg Hx     Asthma Neg Hx     Clotting disorder Neg Hx     Fainting Neg Hx     Heart attack Neg Hx     Heart failure Neg Hx     Hyperlipidemia Neg Hx     Stroke Neg Hx     Atrial Septal Defect Neg Hx        Assessment:    He is doing quite well.  Seemingly class 1.  However, I worry about his mitral regurgitation.  We may need to take a proactive approach in its treatment.  1. Congestive heart failure, unspecified HF chronicity, unspecified heart failure type    2. Severe mitral regurgitation    3. LBBB (left bundle branch block)    4. Primary hypertension    5. Dyslipidemia    6. Biventricular implantable cardioverter-defibrillator (ICD) in situ    7. Chronic combined systolic and diastolic congestive heart failure, NYHA class 2    8. DCM (dilated cardiomyopathy)    9. Paroxysmal atrial fibrillation    10. Obesity (BMI 30.0-34.9)    11. Tobacco abuse, in remission        Plan:      Orders Placed This Encounter   Procedures    BNP     Standing Status:   Future     Number of Occurrences:   1     Standing Expiration Date:   12/7/2024    COMPREHENSIVE METABOLIC PANEL     Standing Status:   Future     Number of Occurrences:   1     Standing Expiration Date:   12/7/2024    CBC Auto Differential     Standing Status:   Future     Number of Occurrences:   1     Standing Expiration Date:   12/7/2024    Echo     Standing Status:   Future     Number of Occurrences:   1     Standing Expiration Date:   10/9/2024     Order Specific Question:   Release to patient     Answer:   Immediate       Follow  up in about 6 months (around 4/9/2024), or if symptoms worsen or fail to improve.    There are no discontinued medications.         Medication List with Changes/Refills   Current Medications    ALLOPURINOL (ZYLOPRIM) 100 MG TABLET    100 mg once daily.     ALPRAZOLAM (XANAX XR) 0.5 MG 24 HR TABLET    Take 0.5 mg by mouth as needed.     CANDESARTAN (ATACAND) 32 MG TABLET    Take 1 tablet (32 mg total) by mouth once daily.    CARVEDILOL (COREG) 12.5 MG TABLET    Take 1 tablet (12.5 mg total) by mouth 2 (two) times a day.    COLCHICINE 0.6 MG TABLET    take 1 tablet by mouth twice a day if needed for gout pain    ELIQUIS 5 MG TAB    TAKE 1 TABLET(5 MG) BY MOUTH TWICE DAILY    FUROSEMIDE (LASIX) 20 MG TABLET    TAKE 1 TABLET BY MOUTH EVERY DAY    SPIRONOLACTONE (ALDACTONE) 25 MG TABLET    Take 1 tablet (25 mg total) by mouth once daily.    UREA (CARMOL) 40 % CREA    Apply topically once daily.       This note is at least partially dictated using the M*Modal Fluency Direct word recognition program. There are word recognition mistakes that are occasionally missed on review.

## 2023-11-21 NOTE — TELEPHONE ENCOUNTER
Contacted the pt about appt made to see dr jose roth and inform that dr salgado office will be contacting him with the date time pb        ----- Message from Lex Cheng MD sent at 11/21/2023  2:34 PM CST -----  His appointment with Dr. Salgado was canceled can you please reinstate it at some convenient date.  Also please mature he comes back within 6 months to see me.

## 2023-11-21 NOTE — TELEPHONE ENCOUNTER
Sent dr filipe salgado staff a message about setting this pt an appt. Appt made for 6 months with dr jose dos santos      ----- Message from Lex Cheng MD sent at 11/21/2023  2:34 PM CST -----  His appointment with Dr. Salgado was canceled can you please reinstate it at some convenient date.  Also please mature he comes back within 6 months to see me.

## 2023-11-27 ENCOUNTER — CLINICAL SUPPORT (OUTPATIENT)
Dept: CARDIOLOGY | Facility: HOSPITAL | Age: 68
End: 2023-11-27
Payer: MEDICARE

## 2023-11-27 DIAGNOSIS — Z95.810 PRESENCE OF AUTOMATIC (IMPLANTABLE) CARDIAC DEFIBRILLATOR: ICD-10-CM

## 2023-11-27 PROCEDURE — 93295 DEV INTERROG REMOTE 1/2/MLT: CPT | Mod: S$GLB,,, | Performed by: INTERNAL MEDICINE

## 2023-11-27 PROCEDURE — 93295 CARDIAC DEVICE CHECK - REMOTE: ICD-10-PCS | Mod: S$GLB,,, | Performed by: INTERNAL MEDICINE

## 2023-11-27 PROCEDURE — 93296 REM INTERROG EVL PM/IDS: CPT | Performed by: INTERNAL MEDICINE

## 2023-12-15 ENCOUNTER — CLINICAL SUPPORT (OUTPATIENT)
Dept: CARDIOLOGY | Facility: HOSPITAL | Age: 68
End: 2023-12-15
Attending: INTERNAL MEDICINE
Payer: MEDICARE

## 2023-12-15 DIAGNOSIS — Z95.810 PRESENCE OF AUTOMATIC (IMPLANTABLE) CARDIAC DEFIBRILLATOR: ICD-10-CM

## 2023-12-15 PROCEDURE — 93296 REM INTERROG EVL PM/IDS: CPT | Performed by: INTERNAL MEDICINE

## 2023-12-27 LAB
OHS CV AF BURDEN PERCENT: < 1
OHS CV BIV PACING PERCENT: 94 %
OHS CV DC REMOTE DEVICE TYPE: NORMAL

## 2024-01-17 ENCOUNTER — OFFICE VISIT (OUTPATIENT)
Dept: CARDIOLOGY | Facility: CLINIC | Age: 69
End: 2024-01-17
Payer: MEDICARE

## 2024-01-17 VITALS
DIASTOLIC BLOOD PRESSURE: 70 MMHG | BODY MASS INDEX: 30.04 KG/M2 | HEART RATE: 65 BPM | SYSTOLIC BLOOD PRESSURE: 134 MMHG | HEIGHT: 72 IN | WEIGHT: 221.81 LBS

## 2024-01-17 DIAGNOSIS — I34.0 SEVERE MITRAL REGURGITATION: Primary | ICD-10-CM

## 2024-01-17 PROCEDURE — 99205 OFFICE O/P NEW HI 60 MIN: CPT | Mod: S$GLB,,, | Performed by: INTERNAL MEDICINE

## 2024-01-17 PROCEDURE — 99999 PR PBB SHADOW E&M-EST. PATIENT-LVL III: CPT | Mod: PBBFAC,,, | Performed by: INTERNAL MEDICINE

## 2024-01-17 RX ORDER — MULTIVITAMIN
1 TABLET ORAL DAILY
COMMUNITY

## 2024-01-17 NOTE — PROGRESS NOTES
PCP - Gabriel Howard MD  Subjective:   Patient ID:  Shae Ramesh III is a 68 y.o. male who presents for  evalaution for Mitral clip.     HPI:   68 year old male with Hx of DCM, HFrEFs/p CRT-D([facility]), Moderate to severe MR, Hypertension, Hyperlipidemia, AF on Coumadin and morbid obesity. Patient has been referred here by his cardiologist for evaluation for Mitral clip for Severe MR. He was previously evaluated back in 2016; however, at the time he was not optimized on GDMT.  Patient denies any chest pain, palpitations, shortness of breath, orthopnea, PND or lower extremity edema. Most recent Cr. 1.3. Hb 14.2.    Home GDMT: Lasix 20 mg, daily, Coreg 12.5 mg, BID, Candesartan 32 mg, daily; Aldactone 25 mg, daily    Echocardiogram (10/9/2023)    Left Ventricle: The left ventricle is severely dilated. Normal wall motion. There is moderately reduced systolic function with a visually estimated ejection fraction of 35 - 40%. Biplane (2D) method of discs ejection fraction is 35%. Grade I diastolic dysfunction.    Left Atrium: Left atrium is severely dilated.    Right Ventricle: Normal right ventricular cavity size. Wall thickness is normal. Right ventricle wall motion  is normal. Systolic function is normal.    Mitral Valve: There is moderate to severe eccentric regurgitation.    Tricuspid Valve: There is mild regurgitation.    Pulmonary Artery: There is mild pulmonary hypertension. The estimated pulmonary artery systolic pressure is 39 mmHg.    IVC/SVC: Normal venous pressure at 3 mmHg.      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    Hypertension     LBBB (left bundle branch block) 6/12/2013    Obesity     Valvular regurgitation      Past Surgical History:   Procedure Laterality Date    ADENOIDECTOMY      APPENDECTOMY       CARDIAC DEFIBRILLATOR PLACEMENT      REPLACEMENT OF IMPLANTABLE CARDIOVERTER-DEFIBRILLATOR (ICD) GENERATOR Left 2020    Procedure: REPLACEMENT, PULSE GENERATOR, ICD;  Surgeon: Lex Cheng MD;  Location: [facility] CATH LAB;  Service: Cardiology;  Laterality: Left;  original device IAN  to st pham CRT-D    TONSILLECTOMY       Social History     Tobacco Use    Smoking status: Former     Current packs/day: 0.00     Average packs/day: 1 pack/day for 20.0 years (20.0 ttl pk-yrs)     Types: Cigarettes     Start date: 1/10/1992     Quit date: 1/10/2012     Years since quittin.0    Smokeless tobacco: Never   Substance Use Topics    Alcohol use: Yes     Comment: social     Family History   Problem Relation Age of Onset    Hypertension Father     Diabetes Maternal Grandmother     Diabetes Maternal Grandfather     Heart disease Paternal Grandmother     No Known Problems Mother     No Known Problems Brother     No Known Problems Paternal Grandfather     Cancer Sister     Early death Sister     Diabetes Sister     Diabetes Maternal Aunt     No Known Problems Maternal Uncle     No Known Problems Paternal Aunt     No Known Problems Paternal Uncle     Anemia Neg Hx     Arrhythmia Neg Hx     Asthma Neg Hx     Clotting disorder Neg Hx     Fainting Neg Hx     Heart attack Neg Hx     Heart failure Neg Hx     Hyperlipidemia Neg Hx     Stroke Neg Hx     Atrial Septal Defect Neg Hx        Meds:     Review of patient's allergies indicates:   Allergen Reactions    Baclofen Nausea Only    Cyclobenzaprine Edema     Swelling and nausea    Crestor [rosuvastatin] Swelling    Lisinopril Nausea Only       Current Outpatient Medications:     allopurinol (ZYLOPRIM) 100 MG tablet, 100 mg once daily. , Disp: , Rfl: 0    alprazolam (XANAX XR) 0.5 MG 24 hr tablet, Take 0.5 mg by mouth as needed. , Disp: , Rfl:     candesartan (ATACAND) 32 MG tablet, Take 1 tablet (32 mg total) by mouth once daily., Disp: 90 tablet, Rfl: 3    carvediloL  (COREG) 12.5 MG tablet, Take 1 tablet (12.5 mg total) by mouth 2 (two) times a day., Disp: 180 tablet, Rfl: 9    colchicine 0.6 mg tablet, take 1 tablet by mouth twice a day if needed for gout pain, Disp: , Rfl: 0    ELIQUIS 5 mg Tab, TAKE 1 TABLET(5 MG) BY MOUTH TWICE DAILY, Disp: 180 tablet, Rfl: 3    furosemide (LASIX) 20 MG tablet, TAKE 1 TABLET BY MOUTH EVERY DAY, Disp: 30 tablet, Rfl: 11    spironolactone (ALDACTONE) 25 MG tablet, Take 1 tablet (25 mg total) by mouth once daily., Disp: 90 tablet, Rfl: 3    urea (CARMOL) 40 % Crea, Apply topically once daily. (Patient not taking: Reported on 10/9/2023), Disp: 198 g, Rfl: 1      Objective:   There were no vitals taken for this visit.  Physical Exam  Vitals and nursing note reviewed.   Constitutional:       Appearance: Normal appearance.   HENT:      Head: Atraumatic.   Eyes:      Conjunctiva/sclera: Conjunctivae normal.   Cardiovascular:      Rate and Rhythm: Normal rate and regular rhythm.      Heart sounds: Murmur heard.   Abdominal:      General: There is no distension.      Palpations: Abdomen is soft.      Tenderness: There is no abdominal tenderness.   Musculoskeletal:      Right lower leg: No edema.      Left lower leg: No edema.   Skin:     General: Skin is warm.   Neurological:      Mental Status: He is alert.       Labs:     Lab Results   Component Value Date     10/09/2023    K 4.0 10/09/2023     10/09/2023    CO2 27 10/09/2023    BUN 16 10/09/2023    CREATININE 1.3 10/09/2023    ANIONGAP 12 10/09/2023     Lab Results   Component Value Date    HGBA1C 6.6 (H) 06/10/2013     Lab Results   Component Value Date     (H) 10/09/2023    BNP 47 11/18/2021    BNP 78 04/12/2021       Lab Results   Component Value Date    WBC 6.99 10/09/2023    HGB 14.2 10/09/2023    HCT 42.8 10/09/2023     10/09/2023    GRAN 3.7 10/09/2023    GRAN 52.7 10/09/2023     Lab Results   Component Value Date    CHOL 202 (H) 03/07/2023    CHOL 200 (H)  06/10/2013    HDL 52 03/07/2023    HDL 26 (L) 06/10/2013    LDLCALC 142 (H) 03/07/2023    LDLCALC 147.0 06/10/2013    TRIG 49 03/07/2023    TRIG 136 06/10/2013       Lab Results   Component Value Date     10/09/2023    K 4.0 10/09/2023     10/09/2023    CO2 27 10/09/2023    BUN 16 10/09/2023    CREATININE 1.3 10/09/2023    ANIONGAP 12 10/09/2023     Lab Results   Component Value Date    HGBA1C 6.6 (H) 06/10/2013     Lab Results   Component Value Date     (H) 10/09/2023    BNP 47 11/18/2021    BNP 78 04/12/2021    Lab Results   Component Value Date    WBC 6.99 10/09/2023    HGB 14.2 10/09/2023    HCT 42.8 10/09/2023     10/09/2023    GRAN 3.7 10/09/2023    GRAN 52.7 10/09/2023     Lab Results   Component Value Date    CHOL 202 (H) 03/07/2023    CHOL 200 (H) 06/10/2013    HDL 52 03/07/2023    HDL 26 (L) 06/10/2013    LDLCALC 142 (H) 03/07/2023    LDLCALC 147.0 06/10/2013    TRIG 49 03/07/2023    TRIG 136 06/10/2013                Cardiovascular Imaging:     Echo:   EF   Date Value Ref Range Status   10/25/2021 40 % Final       Assessment & Plan:     Mitral Regurgitation:  - Echocardiogram was reviewed. Patient with Moderate MR.  - Currently asymptomatic with no dyspnea on exertion, orthopnea/PND or lower extremity swelling. Exercise tolerance unlimited  - At this time patient is not a candidate for Sapphire clip.  - Will recommend CTS evaluation       Betzy Blanchard MD  Cardiovascular Disease, PGY IV  [facility]    I have seen the patient, reviewed the Fellow's history and physical, assessment and plan. I have personally interviewed and examined the patient and agree with the findings.  Echo reviewed with Dr. Hernandez E to a ratio is normal, Pisa equal 0.8, and regurgitation appears moderate at most with normal stroke volume.  Patient is asymptomatic and would recommend medical therapy which he is currently on GDM T except for an SGLT2 inhibitor which could be added.  I would recommend  an additional opinion with CTS.    GARFIELD Castle MD

## 2024-02-20 ENCOUNTER — OFFICE VISIT (OUTPATIENT)
Dept: CARDIOTHORACIC SURGERY | Facility: CLINIC | Age: 69
End: 2024-02-20
Payer: MEDICARE

## 2024-02-20 VITALS
HEART RATE: 65 BPM | HEIGHT: 73 IN | DIASTOLIC BLOOD PRESSURE: 73 MMHG | BODY MASS INDEX: 29.15 KG/M2 | OXYGEN SATURATION: 99 % | WEIGHT: 219.94 LBS | SYSTOLIC BLOOD PRESSURE: 125 MMHG

## 2024-02-20 DIAGNOSIS — I34.0 SEVERE MITRAL REGURGITATION: ICD-10-CM

## 2024-02-20 PROCEDURE — 99999 PR PBB SHADOW E&M-EST. PATIENT-LVL III: CPT | Mod: PBBFAC,,, | Performed by: THORACIC SURGERY (CARDIOTHORACIC VASCULAR SURGERY)

## 2024-02-20 PROCEDURE — 99204 OFFICE O/P NEW MOD 45 MIN: CPT | Mod: S$GLB,,, | Performed by: THORACIC SURGERY (CARDIOTHORACIC VASCULAR SURGERY)

## 2024-02-20 NOTE — PROGRESS NOTES
Subjective:      Patient ID: Shae Ramesh III is a 68 y.o. male.    Chief Complaint: New patient / Mitral Clip candidate       HPI:  Shae Ramesh III is a 68 y.o. male who presents as an initial consult for severe mitral regurgitation.  She has a medical history significant for  DCM, HFrEFs/p CRT-D([facility]), Moderate to severe MR, Hypertension, Hyperlipidemia, AF on Coumadin and morbid obesity. Patient has been referred here by Dr. Castle after he was found to not be a Mitral clip candidate for Severe MR. He was previously evaluated back in 2016; however, at the time he was not optimized on GDMT.  He was found to be asymptomatic with moderate mitral regurgitation  and turned down for mitral clip.  Of note, his LVIDD has been dilated above 7.45 cm since at least 2016. He does have a history of atrial fibrillation.       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    Hypertension     LBBB (left bundle branch block) 6/12/2013    Obesity     Valvular regurgitation      Past Surgical History:   Procedure Laterality Date    ADENOIDECTOMY      APPENDECTOMY      CARDIAC DEFIBRILLATOR PLACEMENT      REPLACEMENT OF IMPLANTABLE CARDIOVERTER-DEFIBRILLATOR (ICD) GENERATOR Left 6/24/2020    Procedure: REPLACEMENT, PULSE GENERATOR, ICD;  Surgeon: Lex Cheng MD;  Location: [facility] CATH LAB;  Service: Cardiology;  Laterality: Left;  original device IAN  to [facility] CRT-D    TONSILLECTOMY       Family History       Problem Relation (Age of Onset)    Cancer Sister    Diabetes Maternal Grandmother, Maternal  Grandfather, Sister, Maternal Aunt    Early death Sister    Heart disease Paternal Grandmother    Hypertension Father    No Known Problems Mother, Brother, Paternal Grandfather, Maternal Uncle, Paternal Aunt, Paternal Uncle          Social History     Socioeconomic History    Marital status: Single   Tobacco Use    Smoking status: Former     Current packs/day: 0.00     Average packs/day: 1 pack/day for 20.0 years (20.0 ttl pk-yrs)     Types: Cigarettes     Start date: 1/10/1992     Quit date: 1/10/2012     Years since quittin.1    Smokeless tobacco: Never   Substance and Sexual Activity    Alcohol use: Yes     Comment: social    Drug use: No       Current Outpatient Medications:     allopurinol (ZYLOPRIM) 100 MG tablet, 100 mg once daily. , Disp: , Rfl: 0    alprazolam (XANAX XR) 0.5 MG 24 hr tablet, Take 0.5 mg by mouth as needed. , Disp: , Rfl:     candesartan (ATACAND) 32 MG tablet, Take 1 tablet (32 mg total) by mouth once daily., Disp: 90 tablet, Rfl: 3    carvediloL (COREG) 12.5 MG tablet, Take 1 tablet (12.5 mg total) by mouth 2 (two) times a day., Disp: 180 tablet, Rfl: 9    colchicine 0.6 mg tablet, take 1 tablet by mouth twice a day if needed for gout pain, Disp: , Rfl: 0    ELIQUIS 5 mg Tab, TAKE 1 TABLET(5 MG) BY MOUTH TWICE DAILY, Disp: 180 tablet, Rfl: 3    fish oil/borage/flax/om3,6,9 1 (OMEGA 3-6-9 ORAL), Take 1 capsule by mouth 2 (two) times a day., Disp: , Rfl:     furosemide (LASIX) 20 MG tablet, TAKE 1 TABLET BY MOUTH EVERY DAY, Disp: 30 tablet, Rfl: 11    multivitamin (ONE DAILY MULTIVITAMIN) per tablet, Take 1 tablet by mouth once daily., Disp: , Rfl:     spironolactone (ALDACTONE) 25 MG tablet, Take 1 tablet (25 mg total) by mouth once daily., Disp: 90 tablet, Rfl: 3  Current medications Reviewed    Review of Systems   Constitutional:  Negative for activity change, appetite change, fatigue and fever.   HENT:  Negative for nosebleeds.    Respiratory:  Negative for cough and shortness of  breath.    Cardiovascular:  Negative for chest pain, palpitations and leg swelling.   Gastrointestinal:  Negative for abdominal distention, abdominal pain and nausea.   Genitourinary:  Negative for frequency.   Musculoskeletal:  Negative for arthralgias and myalgias.   Skin:  Negative for rash.   Neurological:  Negative for dizziness and numbness.   Hematological:  Does not bruise/bleed easily.     Objective:   Physical Exam  HENT:      Head: Normocephalic and atraumatic.   Eyes:      Extraocular Movements: Extraocular movements intact.   Cardiovascular:      Rate and Rhythm: Normal rate and regular rhythm.   Pulmonary:      Effort: Pulmonary effort is normal.   Abdominal:      General: Abdomen is flat.      Palpations: Abdomen is soft.   Musculoskeletal:         General: Normal range of motion.      Cervical back: Normal range of motion.   Skin:     General: Skin is warm and dry.      Capillary Refill: Capillary refill takes less than 2 seconds.   Neurological:      General: No focal deficit present.         Diagnostic Results: Reviewed   ECHO: 10/9/2023    Left Ventricle: The left ventricle is severely dilated. Normal wall motion. There is moderately reduced systolic function with a visually estimated ejection fraction of 35 - 40%. Biplane (2D) method of discs ejection fraction is 35%. Grade I diastolic dysfunction.    Left Atrium: Left atrium is severely dilated.    Right Ventricle: Normal right ventricular cavity size. Wall thickness is normal. Right ventricle wall motion  is normal. Systolic function is normal.    Mitral Valve: There is moderate to severe eccentric regurgitation.    Tricuspid Valve: There is mild regurgitation.    Pulmonary Artery: There is mild pulmonary hypertension. The estimated pulmonary artery systolic pressure is 39 mmHg.    IVC/SVC: Normal venous pressure at 3 mmHg.  Assessment:   Severe Mitral Regurgitation  Plan:     CTS Attending Note:    I have personally taken the history and  examined this patient and agree with the IDANIA's note as stated above.  68-year-old gentleman with dilated cardiomyopathy.  His degree of mitral regurgitation has varied.  Given his reduced ejection fraction, lack of symptoms and the size of his left ventricle I would not recommend surgical correction. BISHNU could be considered if he becomes symptomatic and his MR worsens.

## 2024-03-15 ENCOUNTER — CLINICAL SUPPORT (OUTPATIENT)
Dept: CARDIOLOGY | Facility: HOSPITAL | Age: 69
End: 2024-03-15
Attending: INTERNAL MEDICINE
Payer: MEDICARE

## 2024-03-15 ENCOUNTER — CLINICAL SUPPORT (OUTPATIENT)
Dept: CARDIOLOGY | Facility: HOSPITAL | Age: 69
End: 2024-03-15

## 2024-03-15 DIAGNOSIS — Z95.810 PRESENCE OF AUTOMATIC (IMPLANTABLE) CARDIAC DEFIBRILLATOR: ICD-10-CM

## 2024-03-15 PROCEDURE — 93296 REM INTERROG EVL PM/IDS: CPT | Performed by: INTERNAL MEDICINE

## 2024-03-15 PROCEDURE — 93295 DEV INTERROG REMOTE 1/2/MLT: CPT | Mod: S$GLB,,, | Performed by: INTERNAL MEDICINE

## 2024-04-21 LAB
OHS CV AF BURDEN PERCENT: < 1
OHS CV BIV PACING PERCENT: 94 %
OHS CV DC REMOTE DEVICE TYPE: NORMAL
OHS CV ICD SHOCK: NO

## 2024-04-26 RX ORDER — FUROSEMIDE 20 MG/1
TABLET ORAL
Qty: 30 TABLET | Refills: 11 | Status: SHIPPED | OUTPATIENT
Start: 2024-04-26

## 2024-05-03 DIAGNOSIS — I50.9 CONGESTIVE HEART FAILURE, UNSPECIFIED HF CHRONICITY, UNSPECIFIED HEART FAILURE TYPE: Primary | ICD-10-CM

## 2024-05-06 ENCOUNTER — OFFICE VISIT (OUTPATIENT)
Dept: CARDIOLOGY | Facility: CLINIC | Age: 69
End: 2024-05-06
Payer: MEDICARE

## 2024-05-06 ENCOUNTER — HOSPITAL ENCOUNTER (OUTPATIENT)
Dept: CARDIOLOGY | Facility: HOSPITAL | Age: 69
Discharge: HOME | End: 2024-05-06
Attending: INTERNAL MEDICINE
Payer: MEDICARE

## 2024-05-06 VITALS
WEIGHT: 224.44 LBS | BODY MASS INDEX: 29.74 KG/M2 | HEART RATE: 70 BPM | SYSTOLIC BLOOD PRESSURE: 128 MMHG | OXYGEN SATURATION: 96 % | HEIGHT: 73 IN | DIASTOLIC BLOOD PRESSURE: 70 MMHG

## 2024-05-06 DIAGNOSIS — Z95.810 BIVENTRICULAR IMPLANTABLE CARDIOVERTER-DEFIBRILLATOR (ICD) IN SITU: ICD-10-CM

## 2024-05-06 DIAGNOSIS — I44.7 LBBB (LEFT BUNDLE BRANCH BLOCK): ICD-10-CM

## 2024-05-06 DIAGNOSIS — I48.0 PAROXYSMAL ATRIAL FIBRILLATION: ICD-10-CM

## 2024-05-06 DIAGNOSIS — I10 PRIMARY HYPERTENSION: Chronic | ICD-10-CM

## 2024-05-06 DIAGNOSIS — I50.42 CHRONIC COMBINED SYSTOLIC AND DIASTOLIC CONGESTIVE HEART FAILURE, NYHA CLASS 2: ICD-10-CM

## 2024-05-06 DIAGNOSIS — I34.0 SEVERE MITRAL REGURGITATION: ICD-10-CM

## 2024-05-06 DIAGNOSIS — I50.22 CHRONIC SYSTOLIC CONGESTIVE HEART FAILURE: Primary | ICD-10-CM

## 2024-05-06 DIAGNOSIS — Z79.01 CURRENT USE OF LONG TERM ANTICOAGULATION: ICD-10-CM

## 2024-05-06 DIAGNOSIS — I50.9 CONGESTIVE HEART FAILURE, UNSPECIFIED HF CHRONICITY, UNSPECIFIED HEART FAILURE TYPE: ICD-10-CM

## 2024-05-06 DIAGNOSIS — E78.5 DYSLIPIDEMIA: ICD-10-CM

## 2024-05-06 DIAGNOSIS — I42.0 DILATED CARDIOMYOPATHY: Chronic | ICD-10-CM

## 2024-05-06 PROCEDURE — 3044F HG A1C LEVEL LT 7.0%: CPT | Mod: CPTII,S$GLB,, | Performed by: INTERNAL MEDICINE

## 2024-05-06 PROCEDURE — 1159F MED LIST DOCD IN RCRD: CPT | Mod: CPTII,S$GLB,, | Performed by: INTERNAL MEDICINE

## 2024-05-06 PROCEDURE — 3078F DIAST BP <80 MM HG: CPT | Mod: CPTII,S$GLB,, | Performed by: INTERNAL MEDICINE

## 2024-05-06 PROCEDURE — 1101F PT FALLS ASSESS-DOCD LE1/YR: CPT | Mod: CPTII,S$GLB,, | Performed by: INTERNAL MEDICINE

## 2024-05-06 PROCEDURE — 93010 ELECTROCARDIOGRAM REPORT: CPT | Mod: ,,, | Performed by: INTERNAL MEDICINE

## 2024-05-06 PROCEDURE — 1160F RVW MEDS BY RX/DR IN RCRD: CPT | Mod: CPTII,S$GLB,, | Performed by: INTERNAL MEDICINE

## 2024-05-06 PROCEDURE — 4010F ACE/ARB THERAPY RXD/TAKEN: CPT | Mod: CPTII,S$GLB,, | Performed by: INTERNAL MEDICINE

## 2024-05-06 PROCEDURE — 3288F FALL RISK ASSESSMENT DOCD: CPT | Mod: CPTII,S$GLB,, | Performed by: INTERNAL MEDICINE

## 2024-05-06 PROCEDURE — 1126F AMNT PAIN NOTED NONE PRSNT: CPT | Mod: CPTII,S$GLB,, | Performed by: INTERNAL MEDICINE

## 2024-05-06 PROCEDURE — 93005 ELECTROCARDIOGRAM TRACING: CPT

## 2024-05-06 PROCEDURE — 3074F SYST BP LT 130 MM HG: CPT | Mod: CPTII,S$GLB,, | Performed by: INTERNAL MEDICINE

## 2024-05-06 PROCEDURE — 3008F BODY MASS INDEX DOCD: CPT | Mod: CPTII,S$GLB,, | Performed by: INTERNAL MEDICINE

## 2024-05-06 PROCEDURE — 99999 PR PBB SHADOW E&M-EST. PATIENT-LVL IV: CPT | Mod: PBBFAC,,, | Performed by: INTERNAL MEDICINE

## 2024-05-06 PROCEDURE — 99215 OFFICE O/P EST HI 40 MIN: CPT | Mod: S$GLB,,, | Performed by: INTERNAL MEDICINE

## 2024-05-06 NOTE — PROGRESS NOTES
Subjective:   Patient ID:  Shae Ramesh III is a 68 y.o. male     Chief complaint:  Mitral regurgitation    HPI  Background (see note by N May dated 4/12/21):  DCM, Chronic systolic CHF, S/P CRT-D([facility]), HTN, HLP, Metabolic syndrome, AFIB on Coumadin, morbid obesity. He has had excess stress due to his wife's medical conditions recently due to CVA.      Device check completed today in office, noted AFL episodes noted and reviewed with Dr Cheng. Plans for BNP today to assess volume status.      He does endorse that the last few days, he has had increased in SLOAN episodes.      Denies chest pain or anginal equivalents. Denies orthopnea, PND or abdominal bloating. Reports regular walking without any issues lately. NO leg swelling or claudications. No recent falls, syncope or near syncopal events. Reports compliance with medications and dietary restrictions. NO CNS complaints to suggest TIA or CVA today. No signs of abnormal bleeding on Coumadin.     Most recent INR 2.8, followed by [facility].      Update 10/18/2021:  ICD eval today - all Ok - occ PAF -   He has been feeling well in general   He has bee now off amiodarone  I have reviewed the actual image of the ECG tracing obtained today and it shows PBiV pacing with a favorable QRS morphology /duration (QRSd no more than 115 Msec in any one lead).        Update 10/3/2022:  CBC,CMP,NT ProBNP, TSH, Echo were all updated   Labs were all WNL , EF 40%.   He has been doing well - except for the time when he ran out of Coreg and didn't realize it - all went back to normal after he restarted it.     I have reviewed the actual image of the ECG tracing obtained today and it shows PBiV pacing with a favorable QRS morphology /duration (QRSd no more than  120  Msec in any one lead).a An OT/LBB/septalPVC is noted.   He is now out of coumadin for a few days.      Recent ICD eval - all good    >>  DC coumadin  Start Eliquis     Update 11/9/23 :  Has been doing well.   He is active and complains of no dyspnea with exertion.  Patient's device (CRTD)was fully evaluated today under my direct supervision and real-time feedback.  Summary of findings are as listed below:  Device is in good repair.   The battery is not near DAVID.  The sensing and pacing thresholds are favorable with well maintained safety margins.   There was a 3 hour 42 minute AF episode noted on 9/17/23.  This was in the midst of the core view flagged possible fluid overload.  Patient was asymptomatic though.  There were no device data indicating possible ongoing fluid retention.  Core view did show 10 day period of possible fluid between September 9th and September 19th.  Recommendation:  Device follow up as per clinic routine with remote and in house checks     I have reviewed the actual image of the ECG tracing obtained today and it shows PBiV pacing with a favorable QRS morphology /duration (QRSd no more than 130 Msec in any one lead- note that measurements discount local isoelectric latency. Which in this case measures @ 5 msec).   >>   He is doing quite well.  Seemingly class 1.  However, I worry about his mitral regurgitation.  We may need to take a proactive approach in its treatment.     Update 05/26/2024 :  He comes back for follow-up having no cardiovascular complaints.   Although he seems to be class 1, I was a little worried about the echo findings with LVEDD that is in the 7.8 cm range and then LV ESD around 6.2 cm.  There is moderate to severe MR although eccentric.  I sent him to see Dr. Castle who referred him to Dr. Aimee ordoñez.  Both have declined intervention and suggest to continue as we are with medical therapy.      Patient's device (CRTD)was fully evaluated today under my direct supervision and real-time feedback.  Summary of findings are as listed below:  Device is in good repair.   The battery is not near DAVID.  The sensing and pacing thresholds are favorable with well maintained safety margins.    There were no significant tachy-dysrhythmias noted.  There were no device data indicating possible ongoing fluid retention.    Recommendation:  Device follow up as per clinic routine with remote and in house checks    I have reviewed the actual image of the ECG tracing obtained today and it shows PBiV pacing with a favorable QRS morphology /duration (QRSd no more than 120 Msec in any one lead- note that measurements discount local isoelectric latency. Which in this case measures @ 40 msec).      Current Outpatient Medications   Medication Sig    allopurinol (ZYLOPRIM) 100 MG tablet 100 mg once daily.     alprazolam (XANAX XR) 0.5 MG 24 hr tablet Take 0.5 mg by mouth as needed.     candesartan (ATACAND) 32 MG tablet Take 1 tablet (32 mg total) by mouth once daily.    carvediloL (COREG) 12.5 MG tablet Take 1 tablet (12.5 mg total) by mouth 2 (two) times a day.    colchicine 0.6 mg tablet take 1 tablet by mouth twice a day if needed for gout pain    ELIQUIS 5 mg Tab TAKE 1 TABLET(5 MG) BY MOUTH TWICE DAILY    fish oil/borage/flax/om3,6,9 1 (OMEGA 3-6-9 ORAL) Take 1 capsule by mouth 2 (two) times a day.    furosemide (LASIX) 20 MG tablet TAKE 1 TABLET BY MOUTH EVERY DAY    multivitamin (ONE DAILY MULTIVITAMIN) per tablet Take 1 tablet by mouth once daily.    spironolactone (ALDACTONE) 25 MG tablet Take 1 tablet (25 mg total) by mouth 2 (two) times daily.    spironolactone (ALDACTONE) 25 MG tablet TAKE 1 TABLET(25 MG) BY MOUTH EVERY DAY     No current facility-administered medications for this visit.       Review of Systems     Constitutional: Reviewed  for decreased appetite, weight gain and weight loss.   HENT: Reviewed for nosebleeds.    Eyes:  Reviewed for blurred vision and visual disturbance.   Cardiovascular: Reviewed for chest pain, claudication, cyanosis,dyspnea on exertion, leg swelling, orthopnea,paroxysmal nocturnal dyspnearregular heartbeats, palpitations, near-syncope, and syncope.   Respiratory: Reviewed  for cough, shortness of breath, wheezing, sleep disturbances due to breathing and snoring, .    Endocrine: Reviewed for heat intolerance.   Hematologic/Lymphatic: Reviewed for easy bruisability/bleeding.   Skin: Reviewed for rash.   Musculoskeletal: Reviewed for muscle weakness and myalgias.   Gastrointestinal: Reviewed for abdominal pain, anorexia, melena, nausea and vomiting.   Genitourinary: Reviewed for hesitancy, frequency, nocturia and incontinence.   Neurological: Reviewed for excessive daytime sleepiness, dizziness, vertigo, weakness, headaches, loss of balance and seizures,   Psychiatric/Behavioral:  Reviewed for insomnia, altered mental status, depression, anxiety and nervousness.       All symptoms reviewed above were negative except for none     Social History     Tobacco Use   Smoking Status Former    Current packs/day: 0.00    Average packs/day: 1 pack/day for 20.0 years (20.0 ttl pk-yrs)    Types: Cigarettes    Start date: 1/10/1992    Quit date: 1/10/2012    Years since quittin.3   Smokeless Tobacco Never        Objective:     Physical Exam  Vitals and nursing note reviewed.   Constitutional:       Appearance: Normal appearance. He is well-developed.   HENT:      Head: Normocephalic and atraumatic.      Right Ear: External ear normal.      Left Ear: External ear normal.   Eyes:      Conjunctiva/sclera: Conjunctivae normal.      Left eye: Left conjunctiva is not injected. No hemorrhage.     Pupils: Pupils are equal, round, and reactive to light.   Neck:      Thyroid: No thyromegaly.      Vascular: No JVD.   Cardiovascular:      Rate and Rhythm: Normal rate and regular rhythm.      Chest Wall: PMI is not displaced.      Pulses: Intact distal pulses.           Carotid pulses are 2+ on the right side and 2+ on the left side.       Radial pulses are 2+ on the right side and 2+ on the left side.        Dorsalis pedis pulses are 2+ on the right side and 2+ on the left side.        Posterior tibial  "pulses are 2+ on the right side and 2+ on the left side.      Heart sounds: No midsystolic click and no opening snap. Murmur heard.      High-pitched blowing holosystolic murmur is present with a grade of 2/6 at the apex.      No friction rub. No gallop.   Pulmonary:      Effort: Pulmonary effort is normal. No respiratory distress.      Breath sounds: Normal breath sounds. No wheezing or rales.   Chest:      Chest wall: No tenderness.      Comments: Device pocket is in excellent repair.  Abdominal:      Palpations: Abdomen is soft. Abdomen is not rigid. There is no hepatomegaly.      Tenderness: There is no abdominal tenderness.   Musculoskeletal:         General: No tenderness. Normal range of motion.      Cervical back: Neck supple.      Right knee: No swelling.      Left knee: No swelling.      Right lower leg: No swelling.      Left lower leg: No swelling.      Right ankle: No swelling.      Left ankle: No swelling.      Right foot: No swelling.      Left foot: No swelling.   Skin:     General: Skin is warm and dry.      Findings: No rash.   Neurological:      Mental Status: He is alert and oriented to person, place, and time.      Cranial Nerves: No cranial nerve deficit.      Coordination: Coordination normal.      Deep Tendon Reflexes: Reflexes are normal and symmetric.   Psychiatric:         Behavior: Behavior normal.       /70 (BP Location: Left arm, Patient Position: Sitting, BP Method: Small (Manual))   Pulse 70   Ht 6' 1" (1.854 m)   Wt 101.8 kg (224 lb 6.9 oz)   SpO2 96%   BMI 29.61 kg/m²       Results for orders placed during the hospital encounter of 10/09/23    Echo    Interpretation Summary    Left Ventricle: The left ventricle is severely dilated. Normal wall motion. There is moderately reduced systolic function with a visually estimated ejection fraction of 35 - 40%. Biplane (2D) method of discs ejection fraction is 35%. Grade I diastolic dysfunction.    Left Atrium: Left atrium is " severely dilated.    Right Ventricle: Normal right ventricular cavity size. Wall thickness is normal. Right ventricle wall motion  is normal. Systolic function is normal.    Mitral Valve: There is moderate to severe eccentric regurgitation.    Tricuspid Valve: There is mild regurgitation.    Pulmonary Artery: There is mild pulmonary hypertension. The estimated pulmonary artery systolic pressure is 39 mmHg.    IVC/SVC: Normal venous pressure at 3 mmHg.    WBC   Date Value Ref Range Status   10/09/2023 6.99 3.90 - 12.70 K/uL Final     Hematocrit   Date Value Ref Range Status   10/09/2023 42.8 40.0 - 54.0 % Final     Hemoglobin   Date Value Ref Range Status   10/09/2023 14.2 14.0 - 18.0 g/dL Final     Lab Results   Component Value Date     10/09/2023     Lab Results   Component Value Date    CREATININE 1.3 10/09/2023    EGFRNORACEVR >60.0 10/09/2023    K 4.0 10/09/2023     Lab Results   Component Value Date     (H) 05/06/2024            reports current alcohol use.  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    Hypertension     LBBB (left bundle branch block) 6/12/2013    Obesity     Valvular regurgitation      Past Surgical History:   Procedure Laterality Date    ADENOIDECTOMY      APPENDECTOMY      CARDIAC DEFIBRILLATOR PLACEMENT      REPLACEMENT OF IMPLANTABLE CARDIOVERTER-DEFIBRILLATOR (ICD) GENERATOR Left 6/24/2020    Procedure: REPLACEMENT, PULSE GENERATOR, ICD;  Surgeon: Lex Cheng MD;  Location: [facility] CATH LAB;  Service: Cardiology;  Laterality: Left;  original device IAN  to [facility] CRT-D    TONSILLECTOMY       Family History   Problem Relation Name Age of Onset    Hypertension Father      Diabetes Maternal Grandmother      Diabetes Maternal Grandfather      Heart disease Paternal Grandmother      No Known  Problems Mother      No Known Problems Brother 1     No Known Problems Paternal Grandfather      Cancer Sister 1     Early death Sister 1     Diabetes Sister 1     Diabetes Maternal Aunt      No Known Problems Maternal Uncle      No Known Problems Paternal Aunt      No Known Problems Paternal Uncle      Anemia Neg Hx      Arrhythmia Neg Hx      Asthma Neg Hx      Clotting disorder Neg Hx      Fainting Neg Hx      Heart attack Neg Hx      Heart failure Neg Hx      Hyperlipidemia Neg Hx      Stroke Neg Hx      Atrial Septal Defect Neg Hx         Assessment:     1. Chronic systolic congestive heart failure    2. Dilated cardiomyopathy    3. Chronic combined systolic and diastolic congestive heart failure, NYHA class 2    4. Biventricular implantable cardioverter-defibrillator (ICD) in situ    5. Paroxysmal atrial fibrillation    6. Current use of long term anticoagulation    7. Dyslipidemia    8. Primary hypertension    9. LBBB (left bundle branch block)    10. Severe mitral regurgitation        Plan:     Orders Placed This Encounter   Procedures    BNP     Standing Status:   Future     Number of Occurrences:   1     Standing Expiration Date:   7/5/2025    NT-Pro Natriuretic Peptide     Standing Status:   Future     Standing Expiration Date:   8/4/2025       Follow up in about 6 months (around 11/6/2024), or if symptoms worsen or fail to improve.    There are no discontinued medications.         Medication List with Changes/Refills   Current Medications    ALLOPURINOL (ZYLOPRIM) 100 MG TABLET    100 mg once daily.     ALPRAZOLAM (XANAX XR) 0.5 MG 24 HR TABLET    Take 0.5 mg by mouth as needed.     CANDESARTAN (ATACAND) 32 MG TABLET    Take 1 tablet (32 mg total) by mouth once daily.    CARVEDILOL (COREG) 12.5 MG TABLET    Take 1 tablet (12.5 mg total) by mouth 2 (two) times a day.    COLCHICINE 0.6 MG TABLET    take 1 tablet by mouth twice a day if needed for gout pain    ELIQUIS 5 MG TAB    TAKE 1 TABLET(5 MG) BY  MOUTH TWICE DAILY    FISH OIL/BORAGE/FLAX/OM3,6,9 1 (OMEGA 3-6-9 ORAL)    Take 1 capsule by mouth 2 (two) times a day.    FUROSEMIDE (LASIX) 20 MG TABLET    TAKE 1 TABLET BY MOUTH EVERY DAY    MULTIVITAMIN (ONE DAILY MULTIVITAMIN) PER TABLET    Take 1 tablet by mouth once daily.   Changed and/or Refilled Medications    Modified Medication Previous Medication    SPIRONOLACTONE (ALDACTONE) 25 MG TABLET spironolactone (ALDACTONE) 25 MG tablet       Take 1 tablet (25 mg total) by mouth 2 (two) times daily.    Take 1 tablet (25 mg total) by mouth once daily.    SPIRONOLACTONE (ALDACTONE) 25 MG TABLET spironolactone (ALDACTONE) 25 MG tablet       TAKE 1 TABLET(25 MG) BY MOUTH EVERY DAY    Take 1 tablet (25 mg total) by mouth once daily.       This note is at least partially dictated using the M*Modal Fluency Direct word recognition program. There are word recognition mistakes that are occasionally missed on review.      Pre-visit chart review: 15 min    Face to face time: 18 min, > 50% of which spent in education    I have reviewed the actual images of all relevant ECG, rhythm, holter and long term monitoring tracings available in EPIC, MUSE  and in legacy as well as outside records.   I have reviewed the actual images of all relevant CXRays.   I have directly evaluated all relevant data pertaining to any CIED.     Post visit chart documentation and care coordination: 12 min

## 2024-05-07 LAB
OHS QRS DURATION: 130 MS
OHS QTC CALCULATION: 488 MS

## 2024-05-10 ENCOUNTER — TELEPHONE (OUTPATIENT)
Dept: CARDIOLOGY | Facility: CLINIC | Age: 69
End: 2024-05-10
Payer: MEDICARE

## 2024-05-10 NOTE — TELEPHONE ENCOUNTER
Left a message for the pt to contact the office pb        ----- Message from Lex Cheng MD sent at 5/9/2024  3:07 PM CDT -----  See comments below and call patient to discuss.   Please close encounter when done -- no need to route back to me.  Thanks  BNP a bit high increase Spironolactone to 25 mg BID - repeat BMP/BNP in 2 weeks

## 2024-05-13 DIAGNOSIS — I50.22 CHF (CONGESTIVE HEART FAILURE), NYHA CLASS III, CHRONIC, SYSTOLIC: ICD-10-CM

## 2024-05-13 DIAGNOSIS — I50.9 CONGESTIVE HEART FAILURE, UNSPECIFIED HF CHRONICITY, UNSPECIFIED HEART FAILURE TYPE: Primary | ICD-10-CM

## 2024-05-13 DIAGNOSIS — I50.22 CHRONIC SYSTOLIC CONGESTIVE HEART FAILURE: ICD-10-CM

## 2024-05-13 RX ORDER — SPIRONOLACTONE 25 MG/1
25 TABLET ORAL 2 TIMES DAILY
Qty: 60 TABLET | Refills: 3 | Status: SHIPPED | OUTPATIENT
Start: 2024-05-13

## 2024-05-13 NOTE — TELEPHONE ENCOUNTER
Pt notified to increase his spirolactone. labs on 2 weeks pb   pb    ----- Message from Lex Cheng MD sent at 5/9/2024  3:07 PM CDT -----  See comments below and call patient to discuss.   Please close encounter when done -- no need to route back to me.  Thanks  BNP a bit high increase Spironolactone to 25 mg BID - repeat BMP/BNP in 2 weeks

## 2024-05-13 NOTE — TELEPHONE ENCOUNTER
----- Message from Lex Cheng MD sent at 5/9/2024  3:07 PM CDT -----  See comments below and call patient to discuss.   Please close encounter when done -- no need to route back to me.  Thanks  BNP a bit high increase Spironolactone to 25 mg BID - repeat BMP/BNP in 2 weeks

## 2024-05-14 RX ORDER — SPIRONOLACTONE 25 MG/1
25 TABLET ORAL
Qty: 90 TABLET | Refills: 3 | Status: SHIPPED | OUTPATIENT
Start: 2024-05-14

## 2024-06-14 ENCOUNTER — CLINICAL SUPPORT (OUTPATIENT)
Dept: CARDIOLOGY | Facility: HOSPITAL | Age: 69
End: 2024-06-14
Attending: INTERNAL MEDICINE
Payer: MEDICARE

## 2024-06-14 ENCOUNTER — CLINICAL SUPPORT (OUTPATIENT)
Dept: CARDIOLOGY | Facility: HOSPITAL | Age: 69
End: 2024-06-14
Payer: MEDICARE

## 2024-06-14 DIAGNOSIS — Z95.810 PRESENCE OF AUTOMATIC (IMPLANTABLE) CARDIAC DEFIBRILLATOR: ICD-10-CM

## 2024-06-14 PROCEDURE — 93295 DEV INTERROG REMOTE 1/2/MLT: CPT | Mod: S$GLB,,, | Performed by: INTERNAL MEDICINE

## 2024-06-14 PROCEDURE — 93296 REM INTERROG EVL PM/IDS: CPT | Performed by: INTERNAL MEDICINE

## 2024-06-19 LAB
OHS CV AF BURDEN PERCENT: < 1
OHS CV BIV PACING PERCENT: 91 %
OHS CV DC REMOTE DEVICE TYPE: NORMAL
OHS CV ICD SHOCK: NO

## 2024-08-05 DIAGNOSIS — I50.22 CHF (CONGESTIVE HEART FAILURE), NYHA CLASS III, CHRONIC, SYSTOLIC: ICD-10-CM

## 2024-08-06 RX ORDER — SPIRONOLACTONE 25 MG/1
25 TABLET ORAL 2 TIMES DAILY
Qty: 180 TABLET | Refills: 3 | Status: SHIPPED | OUTPATIENT
Start: 2024-08-06

## 2024-08-29 RX ORDER — CARVEDILOL 12.5 MG/1
12.5 TABLET ORAL 2 TIMES DAILY
Qty: 180 TABLET | Refills: 9 | Status: SHIPPED | OUTPATIENT
Start: 2024-08-29

## 2024-08-30 ENCOUNTER — LAB VISIT (OUTPATIENT)
Dept: LAB | Facility: HOSPITAL | Age: 69
End: 2024-08-30
Attending: INTERNAL MEDICINE
Payer: MEDICARE

## 2024-08-30 DIAGNOSIS — I50.9 CONGESTIVE HEART FAILURE, UNSPECIFIED HF CHRONICITY, UNSPECIFIED HEART FAILURE TYPE: ICD-10-CM

## 2024-08-30 DIAGNOSIS — I50.22 CHRONIC SYSTOLIC CONGESTIVE HEART FAILURE: ICD-10-CM

## 2024-08-30 LAB
ANION GAP SERPL CALC-SCNC: 7 MMOL/L (ref 8–16)
BUN SERPL-MCNC: 25 MG/DL (ref 8–23)
CALCIUM SERPL-MCNC: 9.6 MG/DL (ref 8.7–10.5)
CHLORIDE SERPL-SCNC: 103 MMOL/L (ref 95–110)
CO2 SERPL-SCNC: 27 MMOL/L (ref 23–29)
CREAT SERPL-MCNC: 1.4 MG/DL (ref 0.5–1.4)
EST. GFR  (NO RACE VARIABLE): 55.4 ML/MIN/1.73 M^2
GLUCOSE SERPL-MCNC: 121 MG/DL (ref 70–110)
POTASSIUM SERPL-SCNC: 5.3 MMOL/L (ref 3.5–5.1)
SODIUM SERPL-SCNC: 137 MMOL/L (ref 136–145)

## 2024-08-30 PROCEDURE — 83880 ASSAY OF NATRIURETIC PEPTIDE: CPT | Performed by: INTERNAL MEDICINE

## 2024-08-30 PROCEDURE — 83880 ASSAY OF NATRIURETIC PEPTIDE: CPT | Mod: 91 | Performed by: INTERNAL MEDICINE

## 2024-08-30 PROCEDURE — 80048 BASIC METABOLIC PNL TOTAL CA: CPT | Performed by: INTERNAL MEDICINE

## 2024-08-30 PROCEDURE — 36415 COLL VENOUS BLD VENIPUNCTURE: CPT | Performed by: INTERNAL MEDICINE

## 2024-08-30 RX ORDER — CANDESARTAN 32 MG/1
32 TABLET ORAL DAILY
Qty: 90 TABLET | Refills: 3 | Status: SHIPPED | OUTPATIENT
Start: 2024-08-30

## 2024-08-30 NOTE — TELEPHONE ENCOUNTER
----- Message from Silvia Perez sent at 8/30/2024  1:15 PM CDT -----  Contact: shae  .Type:  RX Refill Request    Who Called:  Shae   Refill or New Rx: Refill   RX Name and Strength: candesartan (ATACAND) 32 MG tablet   How is the patient currently taking it? (ex. 1XDay): As prescribed   Is this a 30 day or 90 day RX: 90   Preferred Pharmacy with phone number: .  [id number] - [place] - [address] AT [facility] [address]  [address]  [place] [address]  Phone: [phone] Fax: [phone]      Local or Mail Order: local   Ordering Provider: Dr jose roth   Would the patient rather a call back or a response via [facility]?  Call   Best Call Back Number: [phone]   Additional Information:

## 2024-08-31 LAB
BNP SERPL-MCNC: 97 PG/ML (ref 0–99)
NT-PROBNP SERPL IA-MCNC: 483 PG/ML

## 2024-09-03 ENCOUNTER — TELEPHONE (OUTPATIENT)
Dept: CARDIOLOGY | Facility: CLINIC | Age: 69
End: 2024-09-03
Payer: MEDICARE

## 2024-09-03 NOTE — TELEPHONE ENCOUNTER
Spoke with pt to discuss lab and new orders for spironolactone from 25mg to 12.5mg daily . Pt informed me that he is currently taking 25mg of spironolactone 25mg bid per orders of Dr. Scherer. In 08/06/2024 so pt would like to know if he should only change to taking 25mg daily  or 12.5 mg . ? Please advise     Pt states he will take 25mg daily until further instructions from the providers ./ TIAGO ELAM               ----- Message from Lex Cheng MD sent at 9/3/2024 12:31 AM CDT -----  See comments below and call patient to discuss.   Please close encounter when done -- no need to route back to me.  Thanks  His BNP and proBNP are within normal limits.  His potassium is a bit on the high side.  Please ask him to cut his spironolactone in half (12.5 mg a day, half a tablet of his current 25 mg tablet).  Thank

## 2024-09-03 NOTE — PROGRESS NOTES
See comments below and call patient to discuss.   Please close encounter when done -- no need to route back to me.  Thanks  His BNP and proBNP are within normal limits.  His potassium is a bit on the high side.  Please ask him to cut his spironolactone in half (12.5 mg a day, half a tablet of his current 25 mg tablet).  Thank

## 2024-09-13 ENCOUNTER — CLINICAL SUPPORT (OUTPATIENT)
Dept: CARDIOLOGY | Facility: HOSPITAL | Age: 69
End: 2024-09-13
Attending: INTERNAL MEDICINE
Payer: MEDICARE

## 2024-09-13 ENCOUNTER — CLINICAL SUPPORT (OUTPATIENT)
Dept: CARDIOLOGY | Facility: HOSPITAL | Age: 69
End: 2024-09-13
Payer: MEDICARE

## 2024-09-13 DIAGNOSIS — Z95.810 PRESENCE OF AUTOMATIC (IMPLANTABLE) CARDIAC DEFIBRILLATOR: ICD-10-CM

## 2024-09-13 PROCEDURE — 93296 REM INTERROG EVL PM/IDS: CPT | Performed by: INTERNAL MEDICINE

## 2024-09-13 PROCEDURE — 93295 DEV INTERROG REMOTE 1/2/MLT: CPT | Mod: S$GLB,,, | Performed by: INTERNAL MEDICINE

## 2024-09-18 LAB
OHS CV AF BURDEN PERCENT: < 1
OHS CV BIV PACING PERCENT: 92 %
OHS CV DC REMOTE DEVICE TYPE: NORMAL

## 2024-10-08 DIAGNOSIS — Z95.810 BIVENTRICULAR IMPLANTABLE CARDIOVERTER-DEFIBRILLATOR (ICD) IN SITU: Primary | ICD-10-CM

## 2024-10-08 DIAGNOSIS — I50.9 CONGESTIVE HEART FAILURE, UNSPECIFIED HF CHRONICITY, UNSPECIFIED HEART FAILURE TYPE: ICD-10-CM

## 2024-10-08 DIAGNOSIS — I44.7 LBBB (LEFT BUNDLE BRANCH BLOCK): ICD-10-CM

## 2024-10-08 DIAGNOSIS — I42.0 DILATED CARDIOMYOPATHY: ICD-10-CM

## 2024-11-20 ENCOUNTER — TELEPHONE (OUTPATIENT)
Dept: CARDIOLOGY | Facility: CLINIC | Age: 69
End: 2024-11-20
Payer: MEDICARE

## 2024-12-06 DIAGNOSIS — I42.0 DILATED CARDIOMYOPATHY: ICD-10-CM

## 2024-12-06 DIAGNOSIS — Z95.810 BIVENTRICULAR IMPLANTABLE CARDIOVERTER-DEFIBRILLATOR (ICD) IN SITU: Primary | ICD-10-CM

## 2024-12-06 DIAGNOSIS — I44.7 LBBB (LEFT BUNDLE BRANCH BLOCK): ICD-10-CM

## 2024-12-06 DIAGNOSIS — I50.9 CONGESTIVE HEART FAILURE, UNSPECIFIED HF CHRONICITY, UNSPECIFIED HEART FAILURE TYPE: ICD-10-CM

## 2024-12-12 ENCOUNTER — HOSPITAL ENCOUNTER (OUTPATIENT)
Dept: CARDIOLOGY | Facility: HOSPITAL | Age: 69
Discharge: HOME | End: 2024-12-12
Attending: INTERNAL MEDICINE
Payer: MEDICARE

## 2024-12-12 ENCOUNTER — OFFICE VISIT (OUTPATIENT)
Dept: CARDIOLOGY | Facility: CLINIC | Age: 69
End: 2024-12-12
Payer: MEDICARE

## 2024-12-12 VITALS
SYSTOLIC BLOOD PRESSURE: 118 MMHG | WEIGHT: 231.13 LBS | BODY MASS INDEX: 30.63 KG/M2 | DIASTOLIC BLOOD PRESSURE: 70 MMHG | HEART RATE: 76 BPM | HEIGHT: 73 IN

## 2024-12-12 DIAGNOSIS — I34.0 SEVERE MITRAL REGURGITATION: ICD-10-CM

## 2024-12-12 DIAGNOSIS — I50.22 CHRONIC SYSTOLIC HEART FAILURE: ICD-10-CM

## 2024-12-12 DIAGNOSIS — I48.0 PAROXYSMAL ATRIAL FIBRILLATION: ICD-10-CM

## 2024-12-12 DIAGNOSIS — Z79.01 CURRENT USE OF LONG TERM ANTICOAGULATION: ICD-10-CM

## 2024-12-12 DIAGNOSIS — I42.0 DILATED CARDIOMYOPATHY: Primary | Chronic | ICD-10-CM

## 2024-12-12 DIAGNOSIS — Z95.810 BIVENTRICULAR IMPLANTABLE CARDIOVERTER-DEFIBRILLATOR (ICD) IN SITU: ICD-10-CM

## 2024-12-12 DIAGNOSIS — I50.22 CHF (CONGESTIVE HEART FAILURE), NYHA CLASS III, CHRONIC, SYSTOLIC: ICD-10-CM

## 2024-12-12 PROCEDURE — 3008F BODY MASS INDEX DOCD: CPT | Mod: CPTII,S$GLB,, | Performed by: NURSE PRACTITIONER

## 2024-12-12 PROCEDURE — 99999 PR PBB SHADOW E&M-EST. PATIENT-LVL III: CPT | Mod: PBBFAC,,, | Performed by: NURSE PRACTITIONER

## 2024-12-12 PROCEDURE — 1101F PT FALLS ASSESS-DOCD LE1/YR: CPT | Mod: CPTII,S$GLB,, | Performed by: NURSE PRACTITIONER

## 2024-12-12 PROCEDURE — 3044F HG A1C LEVEL LT 7.0%: CPT | Mod: CPTII,S$GLB,, | Performed by: NURSE PRACTITIONER

## 2024-12-12 PROCEDURE — 1126F AMNT PAIN NOTED NONE PRSNT: CPT | Mod: CPTII,S$GLB,, | Performed by: NURSE PRACTITIONER

## 2024-12-12 PROCEDURE — 99214 OFFICE O/P EST MOD 30 MIN: CPT | Mod: S$GLB,,, | Performed by: NURSE PRACTITIONER

## 2024-12-12 PROCEDURE — 3074F SYST BP LT 130 MM HG: CPT | Mod: CPTII,S$GLB,, | Performed by: NURSE PRACTITIONER

## 2024-12-12 PROCEDURE — 3078F DIAST BP <80 MM HG: CPT | Mod: CPTII,S$GLB,, | Performed by: NURSE PRACTITIONER

## 2024-12-12 PROCEDURE — 3288F FALL RISK ASSESSMENT DOCD: CPT | Mod: CPTII,S$GLB,, | Performed by: NURSE PRACTITIONER

## 2024-12-12 PROCEDURE — 4010F ACE/ARB THERAPY RXD/TAKEN: CPT | Mod: CPTII,S$GLB,, | Performed by: NURSE PRACTITIONER

## 2024-12-12 PROCEDURE — 1159F MED LIST DOCD IN RCRD: CPT | Mod: CPTII,S$GLB,, | Performed by: NURSE PRACTITIONER

## 2024-12-12 RX ORDER — SPIRONOLACTONE 25 MG/1
12.5 TABLET ORAL 2 TIMES DAILY
Start: 2024-12-12

## 2024-12-12 NOTE — PROGRESS NOTES
Subjective:   Patient ID:  Shae Ramesh III is a 69 y.o. male who presents for evaluation of No chief complaint on file.      HPI     Shae Ramesh III is a 65 year old male who presents to Cardiology clinic for follow up. His current medical conditions include DCM, Chronic systolic CHF, S/P CRT-D([facility]), HTN, HLP, Metabolic syndrome, AFIB on Coumadin, morbid obesity. He returns today and states he is doing ok.     He has had excess stress due to his wife's medical conditions recently due to CVA.     Device check completed today in office, noted AFL episodes noted and reviewed with Dr Cheng. Plans for BNP today to assess volume status.     He does endorse the last few days, he has had increased in SLOAN episodes.     Denies chest pain or anginal equivalents. Denies orthopnea, PND or abdominal bloating. Reports regular walking without any issues lately. NO leg swelling or claudications. No recent falls, syncope or near syncopal events. Reports compliance with medications and dietary restrictions. NO CNS complaints to suggest TIA or CVA today. No signs of abnormal bleeding on Coumadin.    Most recent INR 2.8, followed by [facility].     12/12/2024 update    Shae Ramesh III returns for follow up with device check.     Device check completed today in office- BiV pacing 90%, Numerous PVC noted during check today. Previous PVC burden 4.5%, no arrhythmias or HVRs ntoed. Corevue WNL.     BP well controlled.   Can do tasks that are needed without any CV complaints.     Denies chest pain or anginal equivalents. No shortness of breath, SLOAN or palpitations. Denies orthopnea, PND or abdominal bloating. Reports regular walking without any issues lately. NO leg swelling or claudications. No recent falls, syncope or near syncopal events. Reports compliance with medications and dietary restrictions. NO CNS complaints to suggest TIA or CVA today. No signs of abnormal bleeding on Eliquis.         Past Medical History:    Diagnosis Date    *Atrial fibrillation     Anticoagulant long-term use     Anxiety     Atrial fibrillation     Cardiomyopathy     CHF (congestive heart failure)     Disorder of ejaculation 2013    Hypertension     LBBB (left bundle branch block) 2013    Obesity     Valvular regurgitation        Past Surgical History:   Procedure Laterality Date    ADENOIDECTOMY      APPENDECTOMY      CARDIAC DEFIBRILLATOR PLACEMENT      REPLACEMENT OF IMPLANTABLE CARDIOVERTER-DEFIBRILLATOR (ICD) GENERATOR Left 2020    Procedure: REPLACEMENT, PULSE GENERATOR, ICD;  Surgeon: Lex Cheng MD;  Location: [facility] CATH LAB;  Service: Cardiology;  Laterality: Left;  original device IAN  to [facility] CRT-D    TONSILLECTOMY         Social History     Tobacco Use    Smoking status: Former     Current packs/day: 0.00     Average packs/day: 1 pack/day for 20.0 years (20.0 ttl pk-yrs)     Types: Cigarettes     Start date: 1/10/1992     Quit date: 1/10/2012     Years since quittin.9    Smokeless tobacco: Never   Substance Use Topics    Alcohol use: Yes     Comment: social    Drug use: No       Family History   Problem Relation Name Age of Onset    Hypertension Father      Diabetes Maternal Grandmother      Diabetes Maternal Grandfather      Heart disease Paternal Grandmother      No Known Problems Mother      No Known Problems Brother 1     No Known Problems Paternal Grandfather      Cancer Sister 1     Early death Sister 1     Diabetes Sister 1     Diabetes Maternal Aunt      No Known Problems Maternal Uncle      No Known Problems Paternal Aunt      No Known Problems Paternal Uncle      Anemia Neg Hx      Arrhythmia Neg Hx      Asthma Neg Hx      Clotting disorder Neg Hx      Fainting Neg Hx      Heart attack Neg Hx      Heart failure Neg Hx      Hyperlipidemia Neg Hx      Stroke Neg Hx      Atrial Septal Defect Neg Hx       Wt Readings from Last 3 Encounters:   24 104.9 kg (231 lb 2.4 oz)   24 101.8 kg (224  lb 6.9 oz)   02/20/24 99.7 kg (219 lb 14.5 oz)     Temp Readings from Last 3 Encounters:   06/24/20 97.7 °F (36.5 °C) (Temporal)   10/06/15 97.7 °F (36.5 °C)   09/30/15 98.1 °F (36.7 °C) (Oral)     BP Readings from Last 3 Encounters:   12/12/24 118/70   05/06/24 128/70   02/20/24 125/73     Pulse Readings from Last 3 Encounters:   12/12/24 76   05/06/24 70   02/20/24 65     Current Outpatient Medications on File Prior to Visit   Medication Sig Dispense Refill    allopurinol (ZYLOPRIM) 100 MG tablet 100 mg once daily.   0    alprazolam (XANAX XR) 0.5 MG 24 hr tablet Take 0.5 mg by mouth as needed.       apixaban (ELIQUIS) 5 mg Tab Take 1 tablet (5 mg total) by mouth 2 (two) times daily. 180 tablet 3    candesartan (ATACAND) 32 MG tablet Take 1 tablet (32 mg total) by mouth once daily. 90 tablet 3    carvediloL (COREG) 12.5 MG tablet TAKE 1 TABLET(12.5 MG) BY MOUTH TWICE DAILY 180 tablet 9    colchicine 0.6 mg tablet take 1 tablet by mouth twice a day if needed for gout pain  0    fish oil/borage/flax/om3,6,9 1 (OMEGA 3-6-9 ORAL) Take 1 capsule by mouth 2 (two) times a day.      furosemide (LASIX) 20 MG tablet TAKE 1 TABLET BY MOUTH EVERY DAY 30 tablet 11    multivitamin (ONE DAILY MULTIVITAMIN) per tablet Take 1 tablet by mouth once daily.      [DISCONTINUED] spironolactone (ALDACTONE) 25 MG tablet TAKE 1 TABLET(25 MG) BY MOUTH TWICE DAILY 180 tablet 3    [DISCONTINUED] spironolactone (ALDACTONE) 25 MG tablet TAKE 1 TABLET(25 MG) BY MOUTH EVERY DAY (Patient not taking: Reported on 12/12/2024) 90 tablet 3     No current facility-administered medications on file prior to visit.         Review of Systems   Constitutional: Positive for malaise/fatigue.   HENT:  Negative for hearing loss and hoarse voice.    Eyes:  Negative for blurred vision and visual disturbance.   Cardiovascular:  Negative for chest pain, claudication, dyspnea on exertion, irregular heartbeat, leg swelling, near-syncope, orthopnea, palpitations,  "paroxysmal nocturnal dyspnea and syncope.   Respiratory:  Negative for cough, hemoptysis, shortness of breath, sleep disturbances due to breathing, snoring and wheezing.    Endocrine: Negative for cold intolerance and heat intolerance.   Hematologic/Lymphatic: Bruises/bleeds easily.   Skin:  Negative for color change, dry skin and nail changes.   Musculoskeletal:  Positive for arthritis and back pain. Negative for joint pain and myalgias.   Gastrointestinal:  Negative for bloating, abdominal pain, constipation, nausea and vomiting.   Genitourinary:  Negative for dysuria, flank pain, hematuria and hesitancy.   Neurological:  Negative for headaches, light-headedness, loss of balance, numbness, paresthesias and weakness.   Psychiatric/Behavioral:  Negative for altered mental status.    Allergic/Immunologic: Negative for environmental allergies.     /70 (BP Location: Left arm, Patient Position: Sitting)   Pulse 76   Ht 6' 1" (1.854 m)   Wt 104.9 kg (231 lb 2.4 oz)   BMI 30.50 kg/m²     Objective:   Physical Exam  Vitals and nursing note reviewed.   Constitutional:       General: He is not in acute distress.     Appearance: Normal appearance. He is well-developed. He is obese. He is not ill-appearing.   HENT:      Head: Normocephalic and atraumatic.   Eyes:      Pupils: Pupils are equal, round, and reactive to light.   Neck:      Thyroid: No thyromegaly.      Vascular: No JVD.      Trachea: No tracheal deviation.   Cardiovascular:      Rate and Rhythm: Normal rate and regular rhythm.      Chest Wall: PMI is not displaced.      Pulses: Intact distal pulses.           Radial pulses are 2+ on the right side and 2+ on the left side.        Dorsalis pedis pulses are 2+ on the right side and 2+ on the left side.      Heart sounds: S1 normal and S2 normal. Heart sounds not distant. No murmur heard.     Comments: S/P CRT-D, well healed.   Pulmonary:      Effort: Pulmonary effort is normal. No respiratory distress.      " Breath sounds: Normal breath sounds. No decreased breath sounds, wheezing or rales.   Abdominal:      General: Bowel sounds are normal. There is no distension.      Palpations: Abdomen is soft.      Tenderness: There is no abdominal tenderness.   Musculoskeletal:         General: Normal range of motion.      Cervical back: Full passive range of motion without pain, normal range of motion and neck supple.      Right lower leg: No edema.      Left lower leg: No edema.      Right ankle: No swelling.      Left ankle: No swelling.   Skin:     General: Skin is warm and dry.      Nails: There is no clubbing.   Neurological:      Mental Status: He is alert and oriented to person, place, and time.      Motor: No weakness.   Psychiatric:         Speech: Speech normal.         Behavior: Behavior normal.         Thought Content: Thought content normal.         Judgment: Judgment normal.         Lab Results   Component Value Date    CHOL 211 (H) 08/22/2024    CHOL 202 (H) 03/07/2023    CHOL 220 (H) 08/19/2022     Lab Results   Component Value Date    HDL 62 (H) 08/22/2024    HDL 52 03/07/2023    HDL 48 08/19/2022     Lab Results   Component Value Date    LDLCALC 137 (H) 08/22/2024    LDLCALC 142 (H) 03/07/2023    LDLCALC 163 (H) 08/19/2022     Lab Results   Component Value Date    TRIG 59 08/22/2024    TRIG 49 03/07/2023    TRIG 59 08/19/2022     Lab Results   Component Value Date    CHOLHDL 3.40 08/22/2024    CHOLHDL 3.88 03/07/2023    CHOLHDL 4.58 08/19/2022       Chemistry        Component Value Date/Time     08/30/2024 1129    K 5.3 (H) 08/30/2024 1129     08/30/2024 1129    CO2 27 08/30/2024 1129    BUN 25 (H) 08/30/2024 1129    CREATININE 1.4 08/30/2024 1129     (H) 08/30/2024 1129        Component Value Date/Time    CALCIUM 9.6 08/30/2024 1129    ALKPHOS 60 10/09/2023 1500    AST 17 10/09/2023 1500    ALT 14 10/09/2023 1500    BILITOT 1.2 (H) 10/09/2023 1500    ESTGFRAFRICA >60.0 11/18/2021 0934     EGFRNONAA 58.1 (A) 11/18/2021 0934          Lab Results   Component Value Date    TSH 1.88 08/22/2024     Lab Results   Component Value Date    INR 3.0 (H) 08/18/2022    INR 2.9 (H) 07/07/2022    INR 2.7 (H) 06/09/2022     Lab Results   Component Value Date    WBC 6.99 10/09/2023    HGB 14.2 10/09/2023    HCT 42.8 10/09/2023    MCV 97 10/09/2023     10/09/2023        Assessment:      1. Dilated cardiomyopathy    2. CHF (congestive heart failure), NYHA class III, chronic, systolic    3. Paroxysmal atrial fibrillation    4. Chronic systolic heart failure    5. Biventricular implantable cardioverter-defibrillator (ICD) in situ    6. Severe mitral regurgitation    7. Current use of long term anticoagulation          Plan:   Continue eliquis for cardio-embolic protection  Continue current medical therapy  Monitor PVC burden and adjust medical therapy if needed in future  Dash diet 2 gm sodium restriction  RTc 6 m with device check.     He will let us know if he wants to change locations and EP MD in future.     Nicole May, FNP-C [facility]

## 2024-12-13 ENCOUNTER — CLINICAL SUPPORT (OUTPATIENT)
Dept: CARDIOLOGY | Facility: HOSPITAL | Age: 69
End: 2024-12-13
Attending: INTERNAL MEDICINE
Payer: MEDICARE

## 2024-12-13 ENCOUNTER — CLINICAL SUPPORT (OUTPATIENT)
Dept: CARDIOLOGY | Facility: HOSPITAL | Age: 69
End: 2024-12-13

## 2024-12-13 DIAGNOSIS — Z95.810 PRESENCE OF AUTOMATIC (IMPLANTABLE) CARDIAC DEFIBRILLATOR: ICD-10-CM

## 2024-12-13 PROCEDURE — 93296 REM INTERROG EVL PM/IDS: CPT | Performed by: INTERNAL MEDICINE

## 2024-12-13 PROCEDURE — 93295 DEV INTERROG REMOTE 1/2/MLT: CPT | Mod: S$GLB,,, | Performed by: INTERNAL MEDICINE

## 2025-01-14 LAB
OHS CV AF BURDEN PERCENT: < 1
OHS CV BIV PACING PERCENT: 88 %
OHS CV DC REMOTE DEVICE TYPE: NORMAL

## 2025-03-14 ENCOUNTER — CLINICAL SUPPORT (OUTPATIENT)
Dept: CARDIOLOGY | Facility: HOSPITAL | Age: 70
End: 2025-03-14
Attending: INTERNAL MEDICINE
Payer: MEDICARE

## 2025-03-14 ENCOUNTER — CLINICAL SUPPORT (OUTPATIENT)
Dept: CARDIOLOGY | Facility: HOSPITAL | Age: 70
End: 2025-03-14
Payer: MEDICARE

## 2025-03-14 DIAGNOSIS — Z95.810 PRESENCE OF AUTOMATIC (IMPLANTABLE) CARDIAC DEFIBRILLATOR: ICD-10-CM

## 2025-03-14 DIAGNOSIS — I42.0 DILATED CARDIOMYOPATHY: ICD-10-CM

## 2025-03-14 DIAGNOSIS — I50.42 CHRONIC COMBINED SYSTOLIC (CONGESTIVE) AND DIASTOLIC (CONGESTIVE) HEART FAILURE: ICD-10-CM

## 2025-03-14 PROCEDURE — 93295 DEV INTERROG REMOTE 1/2/MLT: CPT | Mod: S$GLB,,, | Performed by: INTERNAL MEDICINE

## 2025-03-14 PROCEDURE — 93296 REM INTERROG EVL PM/IDS: CPT | Performed by: INTERNAL MEDICINE

## 2025-03-22 LAB
OHS CV AF BURDEN PERCENT: < 1
OHS CV BIV PACING PERCENT: 92 %
OHS CV DC REMOTE DEVICE TYPE: NORMAL
OHS CV ICD SHOCK: NO

## 2025-04-09 RX ORDER — FUROSEMIDE 20 MG/1
20 TABLET ORAL DAILY
Qty: 30 TABLET | Refills: 11 | Status: SHIPPED | OUTPATIENT
Start: 2025-04-09

## 2025-06-12 ENCOUNTER — HOSPITAL ENCOUNTER (OUTPATIENT)
Dept: CARDIOLOGY | Facility: HOSPITAL | Age: 70
Discharge: HOME | End: 2025-06-12
Attending: NURSE PRACTITIONER
Payer: MEDICARE

## 2025-06-12 ENCOUNTER — OFFICE VISIT (OUTPATIENT)
Dept: CARDIOLOGY | Facility: CLINIC | Age: 70
End: 2025-06-12
Payer: MEDICARE

## 2025-06-12 ENCOUNTER — TELEPHONE (OUTPATIENT)
Dept: CARDIOLOGY | Facility: CLINIC | Age: 70
End: 2025-06-12
Payer: MEDICARE

## 2025-06-12 VITALS
DIASTOLIC BLOOD PRESSURE: 60 MMHG | BODY MASS INDEX: 30.3 KG/M2 | WEIGHT: 228.63 LBS | OXYGEN SATURATION: 97 % | HEART RATE: 67 BPM | HEIGHT: 73 IN | SYSTOLIC BLOOD PRESSURE: 120 MMHG

## 2025-06-12 DIAGNOSIS — I48.0 PAROXYSMAL ATRIAL FIBRILLATION: ICD-10-CM

## 2025-06-12 DIAGNOSIS — I42.0 DILATED CARDIOMYOPATHY: Chronic | ICD-10-CM

## 2025-06-12 DIAGNOSIS — I50.42 CHRONIC COMBINED SYSTOLIC AND DIASTOLIC CONGESTIVE HEART FAILURE, NYHA CLASS 2: ICD-10-CM

## 2025-06-12 DIAGNOSIS — Z95.810 BIVENTRICULAR IMPLANTABLE CARDIOVERTER-DEFIBRILLATOR (ICD) IN SITU: ICD-10-CM

## 2025-06-12 DIAGNOSIS — I44.7 LBBB (LEFT BUNDLE BRANCH BLOCK): ICD-10-CM

## 2025-06-12 DIAGNOSIS — I42.0 DCM (DILATED CARDIOMYOPATHY): ICD-10-CM

## 2025-06-12 DIAGNOSIS — Z79.01 CURRENT USE OF LONG TERM ANTICOAGULATION: ICD-10-CM

## 2025-06-12 DIAGNOSIS — I50.22 CHRONIC SYSTOLIC HEART FAILURE: Primary | ICD-10-CM

## 2025-06-12 DIAGNOSIS — I42.0 DILATED CARDIOMYOPATHY: ICD-10-CM

## 2025-06-12 DIAGNOSIS — I50.9 CONGESTIVE HEART FAILURE, UNSPECIFIED HF CHRONICITY, UNSPECIFIED HEART FAILURE TYPE: ICD-10-CM

## 2025-06-12 PROCEDURE — 99999 PR PBB SHADOW E&M-EST. PATIENT-LVL III: CPT | Mod: PBBFAC,,, | Performed by: NURSE PRACTITIONER

## 2025-06-12 PROCEDURE — 93284 PRGRMG EVAL IMPLANTABLE DFB: CPT

## 2025-06-12 NOTE — PROGRESS NOTES
Subjective:   Patient ID:  Shae Ramesh III is a 69 y.o. male who presents for evaluation of Follow-up      HPI     Shae Ramesh III is a 65 year old male who presents to Cardiology clinic for follow up. His current medical conditions include DCM, Chronic systolic CHF, S/P CRT-D([facility]), HTN, HLP, Metabolic syndrome, AFIB on Coumadin, morbid obesity. He returns today and states he is doing ok.     He has had excess stress due to his wife's medical conditions recently due to CVA.     Device check completed today in office, noted AFL episodes noted and reviewed with Dr Cheng. Plans for BNP today to assess volume status.     He does endorse the last few days, he has had increased in SLOAN episodes.     Denies chest pain or anginal equivalents. Denies orthopnea, PND or abdominal bloating. Reports regular walking without any issues lately. NO leg swelling or claudications. No recent falls, syncope or near syncopal events. Reports compliance with medications and dietary restrictions. NO CNS complaints to suggest TIA or CVA today. No signs of abnormal bleeding on Coumadin.    Most recent INR 2.8, followed by [facility]in Clinic.     12/12/2024 update    Shae Ramesh III returns for follow up with device check.     Device check completed today in office- BiV pacing 90%, Numerous PVC noted during check today. Previous PVC burden 4.5%, no arrhythmias or HVRs ntoed. Corevue WNL.     BP well controlled.   Can do tasks that are needed without any CV complaints.     Denies chest pain or anginal equivalents. No shortness of breath, SLOAN or palpitations. Denies orthopnea, PND or abdominal bloating. Reports regular walking without any issues lately. NO leg swelling or claudications. No recent falls, syncope or near syncopal events. Reports compliance with medications and dietary restrictions. NO CNS complaints to suggest TIA or CVA today. No signs of abnormal bleeding on Eliquis.     6/12/2025 update    Shae Ramesh III returns  for follow up with device check.     Device check- CRT pacing 93%, PVC burden 4.5% (decreased from previous check), 2 atrial noise episodes. AF <1%  No arrhythmias or ICD shocks.       Denies chest pain or anginal equivalents. No shortness of breath, SLOAN or palpitations. Denies orthopnea, PND or abdominal bloating. Reports regular walking without any issues lately. NO leg swelling or claudications. No recent falls, syncope or near syncopal events. Reports compliance with medications and dietary restrictions. NO CNS complaints to suggest TIA or CVA today. No signs of abnormal bleeding on Eliquis.     Past Medical History:   Diagnosis Date    *Atrial fibrillation     Anticoagulant long-term use     Anxiety     Atrial fibrillation     Cardiomyopathy     CHF (congestive heart failure)     Disorder of ejaculation 2013    Hypertension     LBBB (left bundle branch block) 2013    Obesity     Valvular regurgitation        Past Surgical History:   Procedure Laterality Date    ADENOIDECTOMY      APPENDECTOMY      CARDIAC DEFIBRILLATOR PLACEMENT      REPLACEMENT OF IMPLANTABLE CARDIOVERTER-DEFIBRILLATOR (ICD) GENERATOR Left 2020    Procedure: REPLACEMENT, PULSE GENERATOR, ICD;  Surgeon: Lex Cheng MD;  Location: [facility] CATH LAB;  Service: Cardiology;  Laterality: Left;  original device IAN  to [facility] CRT-D    TONSILLECTOMY         Social History     Tobacco Use    Smoking status: Former     Current packs/day: 0.00     Average packs/day: 1 pack/day for 20.0 years (20.0 ttl pk-yrs)     Types: Cigarettes     Start date: 1/10/1992     Quit date: 1/10/2012     Years since quittin.4    Smokeless tobacco: Never   Substance Use Topics    Alcohol use: Yes     Comment: social    Drug use: No       Family History   Problem Relation Name Age of Onset    Hypertension Father      Diabetes Maternal Grandmother      Diabetes Maternal Grandfather      Heart disease Paternal Grandmother      No Known Problems Mother       No Known Problems Brother 1     No Known Problems Paternal Grandfather      Cancer Sister 1     Early death Sister 1     Diabetes Sister 1     Diabetes Maternal Aunt      No Known Problems Maternal Uncle      No Known Problems Paternal Aunt      No Known Problems Paternal Uncle      Anemia Neg Hx      Arrhythmia Neg Hx      Asthma Neg Hx      Clotting disorder Neg Hx      Fainting Neg Hx      Heart attack Neg Hx      Heart failure Neg Hx      Hyperlipidemia Neg Hx      Stroke Neg Hx      Atrial Septal Defect Neg Hx       Wt Readings from Last 3 Encounters:   06/12/25 103.7 kg (228 lb 9.9 oz)   12/12/24 104.9 kg (231 lb 2.4 oz)   05/06/24 101.8 kg (224 lb 6.9 oz)     Temp Readings from Last 3 Encounters:   06/24/20 97.7 °F (36.5 °C) (Temporal)   10/06/15 97.7 °F (36.5 °C)   09/30/15 98.1 °F (36.7 °C) (Oral)     BP Readings from Last 3 Encounters:   06/12/25 120/60   12/12/24 118/70   05/06/24 128/70     Pulse Readings from Last 3 Encounters:   06/12/25 67   12/12/24 76   05/06/24 70     Current Outpatient Medications on File Prior to Visit   Medication Sig Dispense Refill    allopurinol (ZYLOPRIM) 100 MG tablet 100 mg once daily.   0    alprazolam (XANAX XR) 0.5 MG 24 hr tablet Take 0.5 mg by mouth as needed.       apixaban (ELIQUIS) 5 mg Tab Take 1 tablet (5 mg total) by mouth 2 (two) times daily. 180 tablet 3    candesartan (ATACAND) 32 MG tablet Take 1 tablet (32 mg total) by mouth once daily. 90 tablet 3    carvediloL (COREG) 12.5 MG tablet TAKE 1 TABLET(12.5 MG) BY MOUTH TWICE DAILY 180 tablet 9    colchicine 0.6 mg tablet take 1 tablet by mouth twice a day if needed for gout pain  0    fish oil/borage/flax/om3,6,9 1 (OMEGA 3-6-9 ORAL) Take 1 capsule by mouth 2 (two) times a day.      furosemide (LASIX) 20 MG tablet Take 1 tablet (20 mg total) by mouth once daily. 30 tablet 11    multivitamin (ONE DAILY MULTIVITAMIN) per tablet Take 1 tablet by mouth once daily.      spironolactone (ALDACTONE) 25 MG tablet  "Take 0.5 tablets (12.5 mg total) by mouth 2 (two) times daily.       No current facility-administered medications on file prior to visit.         Review of Systems   Constitutional: Positive for malaise/fatigue.   HENT:  Negative for hearing loss and hoarse voice.    Eyes:  Negative for blurred vision and visual disturbance.   Cardiovascular:  Negative for chest pain, claudication, dyspnea on exertion, irregular heartbeat, leg swelling, near-syncope, orthopnea, palpitations, paroxysmal nocturnal dyspnea and syncope.   Respiratory:  Negative for cough, hemoptysis, shortness of breath, sleep disturbances due to breathing, snoring and wheezing.    Endocrine: Negative for cold intolerance and heat intolerance.   Hematologic/Lymphatic: Bruises/bleeds easily.   Skin:  Negative for color change, dry skin and nail changes.   Musculoskeletal:  Positive for arthritis and back pain. Negative for joint pain and myalgias.   Gastrointestinal:  Negative for bloating, abdominal pain, constipation, nausea and vomiting.   Genitourinary:  Negative for dysuria, flank pain, hematuria and hesitancy.   Neurological:  Negative for headaches, light-headedness, loss of balance, numbness, paresthesias and weakness.   Psychiatric/Behavioral:  Negative for altered mental status.    Allergic/Immunologic: Negative for environmental allergies.     /60 (BP Location: Left arm, Patient Position: Sitting)   Pulse 67   Ht 6' 1" (1.854 m)   Wt 103.7 kg (228 lb 9.9 oz)   SpO2 97%   BMI 30.16 kg/m²     Objective:   Physical Exam  Vitals and nursing note reviewed.   Constitutional:       General: He is not in acute distress.     Appearance: Normal appearance. He is well-developed. He is obese. He is not ill-appearing.   HENT:      Head: Normocephalic and atraumatic.   Eyes:      Pupils: Pupils are equal, round, and reactive to light.   Neck:      Thyroid: No thyromegaly.      Vascular: No JVD.      Trachea: No tracheal deviation. "   Cardiovascular:      Rate and Rhythm: Normal rate and regular rhythm.      Chest Wall: PMI is not displaced.      Pulses: Intact distal pulses.           Radial pulses are 2+ on the right side and 2+ on the left side.        Dorsalis pedis pulses are 2+ on the right side and 2+ on the left side.      Heart sounds: S1 normal and S2 normal. Heart sounds not distant. No murmur heard.     Comments: S/P CRT-D, well healed.   Pulmonary:      Effort: Pulmonary effort is normal. No respiratory distress.      Breath sounds: Normal breath sounds. No decreased breath sounds, wheezing or rales.   Abdominal:      General: Bowel sounds are normal. There is no distension.      Palpations: Abdomen is soft.      Tenderness: There is no abdominal tenderness.   Musculoskeletal:         General: Normal range of motion.      Cervical back: Full passive range of motion without pain, normal range of motion and neck supple.      Right lower leg: No edema.      Left lower leg: No edema.      Right ankle: No swelling.      Left ankle: No swelling.   Skin:     General: Skin is warm and dry.      Nails: There is no clubbing.   Neurological:      Mental Status: He is alert and oriented to person, place, and time.      Motor: No weakness.   Psychiatric:         Speech: Speech normal.         Behavior: Behavior normal.         Thought Content: Thought content normal.         Judgment: Judgment normal.         Lab Results   Component Value Date    CHOL 211 (H) 02/28/2025    CHOL 211 (H) 08/22/2024    CHOL 202 (H) 03/07/2023     Lab Results   Component Value Date    HDL 57 02/28/2025    HDL 62 (H) 08/22/2024    HDL 52 03/07/2023     Lab Results   Component Value Date    LDLCALC 136 (H) 02/28/2025    LDLCALC 137 (H) 08/22/2024    LDLCALC 142 (H) 03/07/2023     Lab Results   Component Value Date    TRIG 91 02/28/2025    TRIG 59 08/22/2024    TRIG 49 03/07/2023     Lab Results   Component Value Date    CHOLHDL 3.70 02/28/2025    CHOLHDL 3.40  08/22/2024    CHOLHDL 3.88 03/07/2023       Chemistry        Component Value Date/Time     02/28/2025 1434     08/30/2024 1129    K 4.9 02/28/2025 1434    K 5.3 (H) 08/30/2024 1129     08/30/2024 1129    CO2 29 02/28/2025 1434    CO2 27 08/30/2024 1129    BUN 20.9 02/28/2025 1434    BUN 25 (H) 08/30/2024 1129    CREATININE 1.20 02/28/2025 1434    CREATININE 1.4 08/30/2024 1129     (H) 08/30/2024 1129        Component Value Date/Time    CALCIUM 9.3 02/28/2025 1434    CALCIUM 9.6 08/30/2024 1129    ALKPHOS 60 10/09/2023 1500    AST 17 02/28/2025 1434    AST 17 10/09/2023 1500    ALT 14 02/28/2025 1434    ALT 14 10/09/2023 1500    BILITOT 1.0 02/28/2025 1434    BILITOT 1.2 (H) 10/09/2023 1500    ESTGFRAFRICA >60.0 11/18/2021 0934    EGFRNONAA 58.1 (A) 11/18/2021 0934          Lab Results   Component Value Date    TSH 2.25 02/28/2025     Lab Results   Component Value Date    INR 3.0 (H) 08/18/2022    INR 2.9 (H) 07/07/2022    INR 2.7 (H) 06/09/2022     Lab Results   Component Value Date    WBC 6.99 10/09/2023    HGB 14.2 10/09/2023    HCT 42.8 10/09/2023    MCV 97 10/09/2023     10/09/2023        Assessment:      1. Chronic systolic heart failure    2. Biventricular implantable cardioverter-defibrillator (ICD) in situ    3. Dilated cardiomyopathy    4. Paroxysmal atrial fibrillation    5. DCM (dilated cardiomyopathy)    6. Chronic combined systolic and diastolic congestive heart failure, NYHA class 2    7. LBBB (left bundle branch block)    8. Current use of long term anticoagulation            Plan:     Continue eliquis for cardio-embolic protection  Continue current medical therapy  Dash diet 2 gm sodium restriction  RTc 6 m with device check.   Would like to transfer care to Dr Branch at [facility]        Nicole May, FNP-C [facility]

## 2025-06-12 NOTE — Clinical Note
Previous patient of Dr Cheng He would like to transition to Dr Branch at [facility] since he lives in [place].   Please arranged 6 m follow up with device check.   Thanks HORACIO Kasper

## 2025-06-13 ENCOUNTER — CLINICAL SUPPORT (OUTPATIENT)
Dept: CARDIOLOGY | Facility: HOSPITAL | Age: 70
End: 2025-06-13
Payer: MEDICARE

## 2025-06-13 ENCOUNTER — CLINICAL SUPPORT (OUTPATIENT)
Dept: CARDIOLOGY | Facility: HOSPITAL | Age: 70
End: 2025-06-13
Attending: INTERNAL MEDICINE
Payer: MEDICARE

## 2025-06-13 DIAGNOSIS — Z95.810 PRESENCE OF AUTOMATIC (IMPLANTABLE) CARDIAC DEFIBRILLATOR: ICD-10-CM

## 2025-06-13 DIAGNOSIS — I42.0 DILATED CARDIOMYOPATHY: ICD-10-CM

## 2025-06-13 DIAGNOSIS — I50.42 CHRONIC COMBINED SYSTOLIC (CONGESTIVE) AND DIASTOLIC (CONGESTIVE) HEART FAILURE: ICD-10-CM

## 2025-06-13 PROCEDURE — 93296 REM INTERROG EVL PM/IDS: CPT | Performed by: INTERNAL MEDICINE

## 2025-06-13 PROCEDURE — 93295 DEV INTERROG REMOTE 1/2/MLT: CPT | Mod: S$GLB,,, | Performed by: INTERNAL MEDICINE

## 2025-06-16 RX ORDER — CANDESARTAN 32 MG/1
32 TABLET ORAL DAILY
Qty: 90 TABLET | Refills: 3 | Status: SHIPPED | OUTPATIENT
Start: 2025-06-16

## 2025-06-24 LAB
OHS CV AF BURDEN PERCENT: < 1
OHS CV BIV PACING PERCENT: 91 %
OHS CV DC REMOTE DEVICE TYPE: NORMAL

## 2025-07-02 ENCOUNTER — TELEPHONE (OUTPATIENT)
Dept: CARDIOLOGY | Facility: CLINIC | Age: 70
End: 2025-07-02
Payer: MEDICARE

## 2025-07-02 NOTE — TELEPHONE ENCOUNTER
Spoke with pt  Cancelled 7/23 BR APPt with Dr Branch and scheduled 6 mnth follow up in [facility] with Dr Branch as requested  Pt aware  Pt will also need device check in [facility] as well- explained to pt and he is fine with that- I do not know how to schedule device with you guys - so could you please schedule 6mnth device check to coordinate with Dr Branch's 6 mnth check   I informed pt that dates may change in order to coordinate both visits together. Pt verbalized understanding and will await call to schedule     Thanks       ----- Message from HORACIO Kasper sent at 7/2/2025 12:12 PM CDT -----  I saw patient recently and he would like to transfer to Dr Branch in 6 mnths in [place].    Can we get this corrected for patient please.     Thanks  HORACIO Kasper

## 2025-07-03 DIAGNOSIS — I42.0 DILATED CARDIOMYOPATHY: Primary | ICD-10-CM

## 2025-07-03 DIAGNOSIS — Z95.810 PRESENCE OF AUTOMATIC (IMPLANTABLE) CARDIAC DEFIBRILLATOR: ICD-10-CM

## (undated) DEVICE — BLADE PLASMA WIDE SPATULA TIP

## (undated) DEVICE — SUT NUROLON 2-0 CR/SH 8-18

## (undated) DEVICE — DRESSING ADH COVADERM PLUS 4X4

## (undated) DEVICE — PAD GROUNDING DISPER ELECTRODE

## (undated) DEVICE — OIL SILICONE SJM

## (undated) DEVICE — PAD DEFIB CADENCE ADULT R2

## (undated) DEVICE — ADHESIVE DERMABOND ADVANCED

## (undated) DEVICE — DRAPE PACEMAKER PROXIMA

## (undated) DEVICE — KIT WRENCH

## (undated) DEVICE — SCALPEL SZ 10 RETRACTABLE